# Patient Record
Sex: MALE | Race: WHITE | NOT HISPANIC OR LATINO | Employment: OTHER | URBAN - METROPOLITAN AREA
[De-identification: names, ages, dates, MRNs, and addresses within clinical notes are randomized per-mention and may not be internally consistent; named-entity substitution may affect disease eponyms.]

---

## 2017-02-27 ENCOUNTER — APPOINTMENT (OUTPATIENT)
Dept: LAB | Facility: CLINIC | Age: 67
End: 2017-02-27
Payer: MEDICARE

## 2017-02-27 DIAGNOSIS — R94.6 ABNORMAL RESULTS OF THYROID FUNCTION STUDIES: ICD-10-CM

## 2017-02-27 DIAGNOSIS — I10 ESSENTIAL (PRIMARY) HYPERTENSION: ICD-10-CM

## 2017-02-27 DIAGNOSIS — E78.5 HYPERLIPIDEMIA: ICD-10-CM

## 2017-02-27 LAB
ALBUMIN SERPL BCP-MCNC: 3.7 G/DL (ref 3.5–5)
ALP SERPL-CCNC: 83 U/L (ref 46–116)
ALT SERPL W P-5'-P-CCNC: 42 U/L (ref 12–78)
ANION GAP SERPL CALCULATED.3IONS-SCNC: 7 MMOL/L (ref 4–13)
AST SERPL W P-5'-P-CCNC: 17 U/L (ref 5–45)
BILIRUB SERPL-MCNC: 0.47 MG/DL (ref 0.2–1)
BUN SERPL-MCNC: 11 MG/DL (ref 5–25)
CALCIUM SERPL-MCNC: 9.2 MG/DL (ref 8.3–10.1)
CHLORIDE SERPL-SCNC: 103 MMOL/L (ref 100–108)
CHOLEST SERPL-MCNC: 191 MG/DL (ref 50–200)
CO2 SERPL-SCNC: 28 MMOL/L (ref 21–32)
CREAT SERPL-MCNC: 0.77 MG/DL (ref 0.6–1.3)
GFR SERPL CREATININE-BSD FRML MDRD: >60 ML/MIN/1.73SQ M
GLUCOSE SERPL-MCNC: 100 MG/DL (ref 65–140)
HDLC SERPL-MCNC: 59 MG/DL (ref 40–60)
LDLC SERPL CALC-MCNC: 116 MG/DL (ref 0–100)
POTASSIUM SERPL-SCNC: 4.3 MMOL/L (ref 3.5–5.3)
PROT SERPL-MCNC: 7.5 G/DL (ref 6.4–8.2)
SODIUM SERPL-SCNC: 138 MMOL/L (ref 136–145)
TRIGL SERPL-MCNC: 79 MG/DL
TSH SERPL DL<=0.05 MIU/L-ACNC: 3.48 UIU/ML (ref 0.36–3.74)

## 2017-02-27 PROCEDURE — 84443 ASSAY THYROID STIM HORMONE: CPT

## 2017-02-27 PROCEDURE — 80061 LIPID PANEL: CPT

## 2017-02-27 PROCEDURE — 80053 COMPREHEN METABOLIC PANEL: CPT

## 2017-02-27 PROCEDURE — 36415 COLL VENOUS BLD VENIPUNCTURE: CPT

## 2017-03-10 ENCOUNTER — ALLSCRIPTS OFFICE VISIT (OUTPATIENT)
Dept: OTHER | Facility: OTHER | Age: 67
End: 2017-03-10

## 2017-06-29 ENCOUNTER — ALLSCRIPTS OFFICE VISIT (OUTPATIENT)
Dept: OTHER | Facility: OTHER | Age: 67
End: 2017-06-29

## 2017-09-01 DIAGNOSIS — I10 ESSENTIAL (PRIMARY) HYPERTENSION: ICD-10-CM

## 2017-09-01 DIAGNOSIS — E78.5 HYPERLIPIDEMIA: ICD-10-CM

## 2017-09-01 DIAGNOSIS — Z12.5 ENCOUNTER FOR SCREENING FOR MALIGNANT NEOPLASM OF PROSTATE: ICD-10-CM

## 2017-09-01 DIAGNOSIS — R94.6 ABNORMAL RESULTS OF THYROID FUNCTION STUDIES: ICD-10-CM

## 2017-09-11 ENCOUNTER — APPOINTMENT (OUTPATIENT)
Dept: LAB | Facility: CLINIC | Age: 67
End: 2017-09-11
Payer: MEDICARE

## 2017-09-11 DIAGNOSIS — R94.6 ABNORMAL RESULTS OF THYROID FUNCTION STUDIES: ICD-10-CM

## 2017-09-11 DIAGNOSIS — E78.5 HYPERLIPIDEMIA: ICD-10-CM

## 2017-09-11 DIAGNOSIS — Z12.5 ENCOUNTER FOR SCREENING FOR MALIGNANT NEOPLASM OF PROSTATE: ICD-10-CM

## 2017-09-11 DIAGNOSIS — I10 ESSENTIAL (PRIMARY) HYPERTENSION: ICD-10-CM

## 2017-09-11 LAB
ALBUMIN SERPL BCP-MCNC: 3.7 G/DL (ref 3.5–5)
ALP SERPL-CCNC: 85 U/L (ref 46–116)
ALT SERPL W P-5'-P-CCNC: 33 U/L (ref 12–78)
ANION GAP SERPL CALCULATED.3IONS-SCNC: 8 MMOL/L (ref 4–13)
AST SERPL W P-5'-P-CCNC: 18 U/L (ref 5–45)
BASOPHILS # BLD AUTO: 0.03 THOUSANDS/ΜL (ref 0–0.1)
BASOPHILS NFR BLD AUTO: 1 % (ref 0–1)
BILIRUB SERPL-MCNC: 0.7 MG/DL (ref 0.2–1)
BUN SERPL-MCNC: 15 MG/DL (ref 5–25)
CALCIUM SERPL-MCNC: 9.2 MG/DL (ref 8.3–10.1)
CHLORIDE SERPL-SCNC: 104 MMOL/L (ref 100–108)
CHOLEST SERPL-MCNC: 212 MG/DL (ref 50–200)
CO2 SERPL-SCNC: 26 MMOL/L (ref 21–32)
CREAT SERPL-MCNC: 0.76 MG/DL (ref 0.6–1.3)
EOSINOPHIL # BLD AUTO: 0.15 THOUSAND/ΜL (ref 0–0.61)
EOSINOPHIL NFR BLD AUTO: 3 % (ref 0–6)
ERYTHROCYTE [DISTWIDTH] IN BLOOD BY AUTOMATED COUNT: 13.2 % (ref 11.6–15.1)
GFR SERPL CREATININE-BSD FRML MDRD: 96 ML/MIN/1.73SQ M
GLUCOSE P FAST SERPL-MCNC: 107 MG/DL (ref 65–99)
HCT VFR BLD AUTO: 43 % (ref 36.5–49.3)
HDLC SERPL-MCNC: 42 MG/DL (ref 40–60)
HGB BLD-MCNC: 14.9 G/DL (ref 12–17)
LDLC SERPL CALC-MCNC: 145 MG/DL (ref 0–100)
LYMPHOCYTES # BLD AUTO: 1.55 THOUSANDS/ΜL (ref 0.6–4.47)
LYMPHOCYTES NFR BLD AUTO: 26 % (ref 14–44)
MCH RBC QN AUTO: 31.4 PG (ref 26.8–34.3)
MCHC RBC AUTO-ENTMCNC: 34.7 G/DL (ref 31.4–37.4)
MCV RBC AUTO: 91 FL (ref 82–98)
MONOCYTES # BLD AUTO: 0.72 THOUSAND/ΜL (ref 0.17–1.22)
MONOCYTES NFR BLD AUTO: 12 % (ref 4–12)
NEUTROPHILS # BLD AUTO: 3.57 THOUSANDS/ΜL (ref 1.85–7.62)
NEUTS SEG NFR BLD AUTO: 58 % (ref 43–75)
NRBC BLD AUTO-RTO: 0 /100 WBCS
PLATELET # BLD AUTO: 191 THOUSANDS/UL (ref 149–390)
PMV BLD AUTO: 11.3 FL (ref 8.9–12.7)
POTASSIUM SERPL-SCNC: 4.9 MMOL/L (ref 3.5–5.3)
PROT SERPL-MCNC: 7.7 G/DL (ref 6.4–8.2)
PSA SERPL-MCNC: 1.6 NG/ML (ref 0–4)
RBC # BLD AUTO: 4.74 MILLION/UL (ref 3.88–5.62)
SODIUM SERPL-SCNC: 138 MMOL/L (ref 136–145)
T4 FREE SERPL-MCNC: 0.83 NG/DL (ref 0.76–1.46)
TRIGL SERPL-MCNC: 124 MG/DL
TSH SERPL DL<=0.05 MIU/L-ACNC: 4.09 UIU/ML (ref 0.36–3.74)
WBC # BLD AUTO: 6.02 THOUSAND/UL (ref 4.31–10.16)

## 2017-09-11 PROCEDURE — 80053 COMPREHEN METABOLIC PANEL: CPT

## 2017-09-11 PROCEDURE — 80061 LIPID PANEL: CPT

## 2017-09-11 PROCEDURE — 84439 ASSAY OF FREE THYROXINE: CPT

## 2017-09-11 PROCEDURE — 84443 ASSAY THYROID STIM HORMONE: CPT

## 2017-09-11 PROCEDURE — G0103 PSA SCREENING: HCPCS

## 2017-09-11 PROCEDURE — 85025 COMPLETE CBC W/AUTO DIFF WBC: CPT

## 2017-09-11 PROCEDURE — 36415 COLL VENOUS BLD VENIPUNCTURE: CPT

## 2017-09-18 ENCOUNTER — ALLSCRIPTS OFFICE VISIT (OUTPATIENT)
Dept: OTHER | Facility: OTHER | Age: 67
End: 2017-09-18

## 2017-11-10 ENCOUNTER — ALLSCRIPTS OFFICE VISIT (OUTPATIENT)
Dept: OTHER | Facility: OTHER | Age: 67
End: 2017-11-10

## 2017-11-14 NOTE — PROGRESS NOTES
Assessment    1  Serrated adenoma of colon (211 3) (D12 6)   2  Hemorrhoids (455 6) (K64 9)    Discussion/Summary  Discussion Summary:   60-year-old male with prior Colonoscopy by Dr Blake Lopez in November of 2016 at that time he had a sessile serrated adenoma polypectomy from a polyp located at the ileocecal valve  In the interim he had some internal hemorrhoids that were bleeding intermittently  These have come down significantly with the use of Anusol HC suppositories and cream   Colonoscopy, possible internal hemorrhoid banding  Hopefully we can discontinue his hemorrhoid cream at least prescription strength postoperatively  understands to stop his aspirin preoperatively  Goals and Barriers: The patient has the current Goals: Colonoscopy with banding as scheduled  The patent has the current Barriers: None identified  The [facility] [employer], [facility]  is expensive and we hope to discontinue that soon  Patient's Capacity to Self-Care: Patient is able to Self-Care  Patient Education: Educational resources provided: Colonoscopy packet  Medication SE Review and Pt Understands Tx: Possible side effects of new medications were reviewed with the patient/guardian today  The treatment plan was reviewed with the patient/guardian  The patient/guardian understands and agrees with the treatment plan   Counseling Documentation With Imm: The patient was counseled regarding Colonoscopy and banding  Chief Complaint  Chief Complaint Free Text Note Form: Patient is here for a colonoscopy consult  Patient was given a colono information packet  Last colono 2016  deniesmovement: regular, no melena      History of Present Illness  HPI: 73 y o M here for colonoscopy consult  Patient has not acute complaints colonoscopy was in 2016 with hemorrhoid banding  POlyps were visualized and sent for pathology  Results showed serrated adenoma  Earlier this year pt was seen for symptomatic hemorrhoids and bleeding per rectum   He was prescribe steroid topical cream which has help with the pain and bleeding  Pt has no personal or family history of colon cancer  Denies any changes to stool caliber , blood in stool, abdominal pain, nausea, vomiting, fever, weight loss, or night sweats  He had a stress test last year that was said to e normal  He has no cardiac history other than a physiologic murmur  No adverse reaction to anesthesia reported in the past  GG PGY  male status post Colonoscopy by Dr Sridhar Nichols in November of 2016, 1 year ago  He was seen in the into Room in June and we gave him some Anusol for his hemorrhoids which were intermittently bleeding at that time  These have calmed down significantly and the only flare he had with after bike riding with his granddaughter  his Colonoscopy he had a sessile serrated adenoma at the ileocecal polyp and a tubular adenoma also biopsied  He was recalled for 1 year follow-up Colonoscopy to re-evaluate the area of sessile serrated adenoma at the ileocecal valve  he is eating, drinking, moving his bowels without difficulty  No cardiac history  No prior reaction to anesthesia  Review of Systems  Complete-Male:  Constitutional: no fever-- and-- no chills  Eyes: no eye pain  ENT: no nosebleeds  Cardiovascular: no chest pain  Respiratory: no cough  Gastrointestinal: no nausea  Genitourinary: no urinary hesitancy  Musculoskeletal: no joint swelling  Integumentary: no itching  Neurological: no numbness  Psychiatric: no anxiety  Endocrine: no muscle weakness  Hematologic/Lymphatic: no tendency for easy bleeding  Active Problems  1  Abnormal finding on EKG (794 31) (R94 31)   2  Amaurosis fugax (362 34) (G45 3)   3  Aortic regurgitation (424 1) (I35 1)   4  Aortic stenosis (424 1) (I35 0)   5  Colon cancer screening (V76 51) (Z12 11)   6  Colonoscopy (Fiberoptic) Screening   7  Elevated TSH (794 5) (R94 6)   8  Hemorrhoids (455 6) (K64 9)   9  History of bicuspid aortic valve (V13 65) (Z87 74)   10  Hyperlipidemia (272 4) (E78 5)   11  Hypertension (401 9) (I10)   12  Lesion of skin of breast (611 9) (N64 9)   13  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)   14  Mitral regurgitation (424 0) (I34 0)   15  Need for hepatitis C screening test (V73 89) (Z11 59)   16  Need for pneumococcal vaccination (V03 82) (Z23)   17  Need for prophylactic vaccination and inoculation against influenza (V04 81) (Z23)   18  Onychomycosis of toenail (110 1) (B35 1)   19  Screening for genitourinary condition (V81 6) (Z13 89)   20  Special screening examination for neoplasm of prostate (V76 44) (Z12 5)   21  Visual disturbances (368 9) (H53 9)   22  Vitamin D deficiency (268 9) (E55 9)    Past Medical History  1  Hemorrhoids (455 6) (K64 9)   2  History of colonic polyps (V12 72) (Z86 010)   3  History of diverticulosis (V12 79) (Z87 19)   4  History of hyperlipidemia (V12 29) (Z86 39)   5  History of hypertension (V12 59) (Z86 79)   6  History of Murmur (785 2) (R01 1)   7  Need for prophylactic vaccination and inoculation against influenza (V04 81) (Z23)  Active Problems And Past Medical History Reviewed: The active problems and past medical history were reviewed and updated today  Surgical History  1  History of Complete Colonoscopy   2  Denied: History Of Prior Surgery  Surgical History Reviewed: The surgical history was reviewed and updated today  Family History  Mother    1  Family history of Asthma (V17 5)   2  Family history of congestive heart failure (V17 49) (Z82 49)   3  Family history of depression (V17 0) (Z81 8)   4  Family history of Mother  At Age 76  Father    11  Family history of alcohol abuse (V61 41) (Z81 1)   6  Family history of cerebrovascular accident (V17 1) (Z82 3)   7  Family history of Father  At Age 76  Sister    6  Family history of thyroid disease (V18 19) (Z83 49)  Brother    5  Family history of Brother  At Age 62  Family History    10   Family history of Stroke Syndrome (V17 1)  Family History Reviewed: The family history was reviewed and updated today  Social History     · Alcohol Use (History)   · Caffeine Use   · Denied: History of Drug Use   · Has smoke detectors   · Never a smoker   · Uses Safety Equipment - Seatbelts  Social History Reviewed: The social history was reviewed and updated today  Current Meds   1  Analpram-HC 1-1 % Rectal Cream; USE AS DIRECTED; Therapy: 94HNK9605 to (Last Nori Mean)  Requested for: 79VWF4144 Ordered   2  Anusol-HC 25 MG Rectal Suppository; INSERT 1 SUPPOSITORY RECTALLY EVERY 4 TO 6 HOURS AS NEEDED; Therapy: 90URQ8818 to (LODILWLS:99PMZ6294)  Requested for: 24OSS4611; Last Rx:29Jun2017 Ordered   3  Aspirin 81 MG Oral Tablet Delayed Release; take one tablet by mouth daily; Therapy: 54Vwi8546 to (Evaluate:12Apr2017); Last Rx:72Daf6538 Ordered   4  Atorvastatin Calcium 40 MG Oral Tablet; take 1 tablet every day; Therapy: 48JRS4067 to (Evaluate:39Fxn9762)  Requested for: 70CEK7055; Last Rx:01Mar2017 Ordered   5  Centrum Silver TABS; TAKE 1 TABLET DAILY; Therapy: (Recorded:13Nov2012) to Recorded   6  Aldrich-3 Fish Oil CAPS; TAKE 1 CAPSULE DAILY; Therapy: (Recorded:13Nov2012) to Recorded   7  Valsartan 160 MG Oral Tablet; take 1 tablet every day; Therapy: 59DWL1642 to ([facility])  Requested for: 04KYM5237; Last Rx:04Xrb6991 Ordered   8  Vitamin D3 400 UNIT Oral Capsule; TAKE 1 CAPSULE DAILY; Therapy: 29VFF5314 to Recorded   9  Zetia 10 MG Oral Tablet; TAKE 1 TABLET DAILY; Therapy: (Recorded:10Nov2017) to Recorded  Medication List Reviewed: The medication list was reviewed and updated today  Allergies  1  Sulfa Drugs  2  No Known Environmental Allergies   3   No Known Food Allergies    Vitals  Vital Signs    Recorded: 83RQE6034 08:46AM   Temperature 98 9 F, Tympanic   Heart Rate 78, R Radial   Pulse Quality Normal, R Radial   Respiration Quality Normal   Respiration 16   Systolic 085, RUE, Sitting Diastolic 82, RUE, Sitting   Height 5 ft 7 in   Weight 182 lb    BMI Calculated 28 51   BSA Calculated 1 94       Physical Exam   Constitutional well appearing well groomed 73year old male in no acute distress  Pulmonary  Respiratory effort: No increased work of breathing or signs of respiratory distress  Auscultation of lungs: Clear to auscultation, equal breath sounds bilaterally, no wheezes, no rales, no rhonci  Cardiovascular  Auscultation of heart: Normal rate and rhythm, normal S1 and S2, without murmurs  Examination of extremities for edema and/or varicosities: Normal    Abdomen Abdomen soft and benign without tenderness rebound or guarding  Normal active bowel sounds  Skin  Skin and subcutaneous tissue: Normal without rashes or lesions  Psychiatric  Orientation to person, place and time: Normal    Mood and affect: Normal        Provider Comments  Provider Comments:   -  was discussed with the patient at length for Colonoscopy  discussion regarding risks, benefits, and alternatives  Risks of colonoscopy discussed to include bleeding, injury to internal organs, and perforation, which would require surgery for repair  A colon perforation may result in an ostomy bag if necessary for repair  The complications may be identified at the time of procedure or may be delayed in identification  is also a certain percentage of patients who have missed polyps, polyps not seen on the original colonoscopy  This is in inherent risks of the procedure  The patient is aware of Dr El Mark and complication rates for colonoscopy  preparation was explained to the patient at length, including a bowel preparation  Information sheet for bowel preparation is both given to patient and explained in detail  was instructed to take their hypertension medications prior to surgery, stop any blood thinners, and modulate their diabetes medications as per their Primary Care Physician's instructions    understand the risks and benefits and agreed to the plan  Questions are discussed and answered at length  Health Management  Colonoscopy (Fiberoptic) Screening   COLONOSCOPY; every 1 year; Last 33SFV5387; Next Due: 77Cuu6675; Near Due  Hypertension   EKG/ECG- POC; every 1 year; Last 43NJG6225; Next Due: 64PJQ8078; Overdue    Future Appointments    Date/Time Provider Specialty Site   03/23/2018 01:30 PM YANIQUE Castillo   [address] [place] [address]   11/15/2017 01:40 PM Samantha Miller DO Cardiology [facility]       Signatures   Electronically signed by : Lee Ann Grier, [facility]; Nov 10 2017  9:19AM EST                       (Author)    Electronically signed by : Brenda Harper MD; Nov 13 2017  8:29AM EST

## 2017-11-15 ENCOUNTER — ALLSCRIPTS OFFICE VISIT (OUTPATIENT)
Dept: OTHER | Facility: OTHER | Age: 67
End: 2017-11-15

## 2017-11-15 DIAGNOSIS — I35.0 NONRHEUMATIC AORTIC VALVE STENOSIS: ICD-10-CM

## 2017-11-15 DIAGNOSIS — I35.1 NONRHEUMATIC AORTIC VALVE INSUFFICIENCY: ICD-10-CM

## 2017-11-17 NOTE — PROGRESS NOTES
Assessment  Assessed    1  Aortic regurgitation (424 1) (I35 1)   2  Aortic stenosis (424 1) (I35 0)   3  Hyperlipidemia (272 4) (E78 5)   4  Hypertension (401 9) (I10)    Plan  Aortic regurgitation, Aortic stenosis    · Follow-up visit in 1 year Evaluation and Treatment  Follow-up  Status: Complete  Done:15Nov2017   Ordered; Aortic regurgitation, Aortic stenosis; Ordered By: Nina Arias Performed:  Due: 33KAG2497; Last Updated By: Tho Henson; 11/15/2017 2:20:01 PM   · ECHO COMPLETE WITH CONTRAST IF INDICATED; Status:Active; Requestedfor:15Nov2017;    Perform:[facility] Radiology; FNS:71QGE3260; Last Updated Doin Dangelo; 11/15/2017 2:20:01 PM;Ordered; For:Aortic regurgitation, Aortic stenosis; Ordered By:Puneet Owen; Aortic regurgitation, Hypertension    · EKG/ECG- POC; Status:Complete;   Done: 18OVO3149   Perform: In Office; Due:80Top8683; Last Updated By:Denton Pompa; 11/15/2017 1:41:17 PM;Ordered;regurgitation, Hypertension; Ordered By:German Owen;  Hypertension    · EKG/ECG- POC ; every 1 year; Last 86NTV1689; Next 79NMG3362; Status:Active   For: 'Hypertension'Ordered By: Eitan Rico    Discussion/Summary  Cardiology Discussion Summary Free Text Note Form [facility]:   1  h/o Amaurosis fugax 2016,  AS/AR  better on zetia/atorvastatin but cannot afford zetia  Hypertension, controlled  Patient is doing well  Continue daily exercise as he does  Sensible diet  Will obtain follow-up 2D echocardiogram before visit next year  He cannot tolerate receive a statin  Would only anticipate 6% reduction in LDL from 40 mg of atorvastatin to 80 mg  There has been improvement with Zetia but given the fact he is on primary prevention statin without the cardiac event will leave atorvastatin 40 mg daily which is considered a high potency statin at this point  follow-up recommended  Annual lipids and CMP  Chief Complaint  Chief Complaint Free Text Note Form: Annual f/u with EKG   [facility] denies chest pain, no SOB or swelling in LE      History of Present Illness  Cardiology HPI Free Text Note Form [facility]: Patient here follow-up history of bothered valve stenosis mild aortic valve regurgitation  pressure has been controlled  He has been taking pulse are 10 daily  status also improved on both atorvastatin and Zetia  However, lipids run in the 140 range on atorvastatin only  Was intolerant to Crestor due to indigestion  Had better handle on both combination atorvastatin 40 mg and Zetia 10 but was unable to afford  feels well  He walks 3-5 times throughout the week  And he walks 3-5 miles without difficulty  Review of Systems  Cardiology Male ROS:    Cardiac: No complaints of chest pain, no palpitations, no fainiting  Skin: No complaints of nonhealing sores or skin rash  Genitourinary: No complaints of recurrent urinary tract infections, frequent urination at night, difficult urination, blood in urine, kidney stones, loss of bladder control, no kidney or prostate problems, no erectile dysfunction  Psychological: No complaints of feeling depressed, anxiety, panic attacks, or difficulty concentrating  General: No complaints of trouble sleeping, lack of energy, fatigue, appetite changes, weight changes, fever, frequent infections, or night sweats  Respiratory: No complaints of shortness of breath, cough with sputum, or wheezing  HEENT: No complaints of serious problems, hearing problems, nose problems, throat problems, or snoring  Gastrointestinal: No complaints of liver problems, nausea, vomiting, heartburn, constipation, bloody stools, diarrhea, problems swallowing, adbominal pain, or rectal bleeding  Hematologic: No complaints of bleeding disorders, anemia, blood clots, or excessive brusing  Neurological: No complaints of numbness, tingling, dizziness, weakness, seizures, headaches, syncope or fainting, AM fatigue, daytime sleepiness, no witnessed apnea episodes    Musculoskeletal: No complaints of arthritis, back pain, or painfull swelling  ROS Reviewed:   ROS reviewed  Active Problems  Problems    1  Abnormal finding on EKG (794 31) (R94 31)   2  Amaurosis fugax (362 34) (G45 3)   3  Aortic regurgitation (424 1) (I35 1)   4  Aortic stenosis (424 1) (I35 0)   5  Colon cancer screening (V76 51) (Z12 11)   6  Colonoscopy (Fiberoptic) Screening   7  Elevated TSH (794 5) (R94 6)   8  Hemorrhoids (455 6) (K64 9)   9  History of bicuspid aortic valve (V13 65) (Z87 74)   10  Hyperlipidemia (272 4) (E78 5)   11  Hypertension (401 9) (I10)   12  Lesion of skin of breast (611 9) (N64 9)   13  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)   14  Mitral regurgitation (424 0) (I34 0)   15  Need for hepatitis C screening test (V73 89) (Z11 59)   16  Need for pneumococcal vaccination (V03 82) (Z23)   17  Need for prophylactic vaccination and inoculation against influenza (V04 81) (Z23)   18  Onychomycosis of toenail (110 1) (B35 1)   19  Screening for genitourinary condition (V81 6) (Z13 89)   20  Serrated adenoma of colon (211 3) (D12 6)   21  Special screening examination for neoplasm of prostate (V76 44) (Z12 5)   22  Visual disturbances (368 9) (H53 9)   23  Vitamin D deficiency (268 9) (E55 9)    Past Medical History  Problems    1  Hemorrhoids (455 6) (K64 9)   2  History of colonic polyps (V12 72) (Z86 010)   3  History of diverticulosis (V12 79) (Z87 19)   4  History of hyperlipidemia (V12 29) (Z86 39)   5  History of hypertension (V12 59) (Z86 79)   6  History of Murmur (785 2) (R01 1)   7  Need for prophylactic vaccination and inoculation against influenza (V04 81) (Z23)  Active Problems And Past Medical History Reviewed: The active problems and past medical history were reviewed and updated today  Surgical History  Problems    1  History of Complete Colonoscopy   2  Denied: History Of Prior Surgery  Surgical History Reviewed: The surgical history was reviewed and updated today  Family History  Mother    1  Family history of Asthma (V17 5)   2  Family history of congestive heart failure (V17 49) (Z82 49)   3  Family history of depression (V17 0) (Z81 8)   4  Family history of Mother  At Age 76  Father    11  Family history of alcohol abuse (V61 41) (Z81 1)   6  Family history of cerebrovascular accident (V17 1) (Z82 3)   7  Family history of Father  At Age 76  Sister    6  Family history of thyroid disease (V18 19) (Z83 49)  Brother    5  Family history of Brother  At Age 62  Family History    10  Family history of Stroke Syndrome (V17 1)  Family History Reviewed: The family history was reviewed and updated today  Social History  Problems    · Alcohol Use (History)   · Caffeine Use   · Denied: History of Drug Use   · Has smoke detectors   · Never a smoker   · Uses Safety Equipment - Seatbelts  Social History Reviewed: The social history was reviewed and updated today  Current Meds   1  Analpram-HC 1-1 % Rectal Cream; USE AS DIRECTED; Therapy: 79HAP7624 to (Last Tiff Patricio)  Requested for: 79OED4569 Ordered   2  Aspirin 81 MG Oral Tablet Delayed Release; take one tablet by mouth daily; Therapy: 41FYI3740 to (Evaluate:2018) Recorded   3  Atorvastatin Calcium 40 MG Oral Tablet; take 1 tablet every day; Therapy: 35AMI7021 to (Evaluate:30Tdr8398)  Requested for: 70UQF7066; Last Rx:2017 Ordered   4  Centrum Silver TABS; TAKE 1 TABLET DAILY; Therapy: (Recorded:2012) to Recorded   5  [place]-3 Fish Oil CAPS; TAKE 1 CAPSULE DAILY; Therapy: (Recorded:2012) to Recorded   6  Travatan Z 0 004 % Ophthalmic Solution; Therapy: 88EYA6403 to Recorded   7  Valsartan 160 MG Oral Tablet; take 1 tablet every day; Therapy: 19MFD6356 to (Victorino Sommers)  Requested for: 03TSE2522; Last Rx:36Yas4136 Ordered   8  Vitamin D3 400 UNIT Oral Capsule; TAKE 1 CAPSULE DAILY; Therapy: 69XWQ3342 to Recorded   9   Zetia 10 MG Oral Tablet; TAKE 1 TABLET DAILY; Therapy: (Recorded:10Nov2017) to Recorded    Allergies  Medication    1  Sulfa Drugs  Non-Medication    2  No Known Environmental Allergies   3  No Known Food Allergies    Vitals  Vital Signs    Recorded: 38ZCL3010 01:50PM   Heart Rate 65, Apical   Systolic 883, RUE   Diastolic 80, RUE   Height 5 ft 7 in   Weight 183 lb    BMI Calculated 28 66   BSA Calculated 1 95       Physical Exam   Constitutional  General appearance: No acute distress, well appearing and well nourished  Eyes  Conjunctiva and Sclera examination: Conjunctiva pink, sclera anicteric  Ears, Nose, Mouth, and Throat - Oropharynx: Clear, nares are clear, mucous membranes are moist   Neck  Neck and thyroid: Normal, supple, trachea midline, no thyromegaly  Pulmonary  Respiratory effort: No increased work of breathing or signs of respiratory distress  Auscultation of lungs: Clear to auscultation, no rales, no rhonchi, no wheezing, good air movement  Cardiovascular  Auscultation of heart: Abnormal  -- 1/6 zoë  Carotid pulses: Normal, 2+ bilaterally  Peripheral vascular exam: Normal pulses throughout, no tenderness, erythema or swelling  Pedal pulses: Normal, 2+ bilaterally  Examination of extremities for edema and/or varicosities: Normal    Abdomen  Abdomen: Non-tender and no distention  Liver and spleen: No hepatomegaly or splenomegaly  Musculoskeletal Gait and station: Normal gait  -- Digits and nails: Normal without clubbing or cyanosis  -- Inspection/palpation of joints, bones, and muscles: Normal, ROM normal    Skin - Skin and subcutaneous tissue: Normal without rashes or lesions  Skin is warm and well perfused, normal turgor  Neurologic - Cranial nerves: II - XII intact  -- Speech: Normal    Psychiatric - Orientation to person, place, and time: Normal -- Mood and affect: Normal       Health Management  Colonoscopy (Fiberoptic) Screening   COLONOSCOPY; every 1 year;  Last 47CCR0303; Next Due: 29Nov2017; Near Due  Hypertension EKG/ECG- POC; every 1 year; Last 22XGC2095; Next Due: 35EMH5610; Active    Future Appointments    Date/Time Provider Specialty Site   03/23/2018 01:30 PM Mikie Canavan, M D   [address] [place] [address]   11/28/2017 11:00 AM Cindy Kennedy MD General Surgery [facility]  [id number]       Signatures   Electronically signed by : Bashir Steiner DO; Nov 16 2017 10:34AM EST                       (Author)

## 2017-11-20 RX ORDER — HYDROCORTISONE ACETATE 25 MG/1
25 SUPPOSITORY RECTAL 2 TIMES DAILY
Status: ON HOLD | COMMUNITY
End: 2018-02-26

## 2017-11-27 ENCOUNTER — ANESTHESIA EVENT (OUTPATIENT)
Dept: GASTROENTEROLOGY | Facility: HOSPITAL | Age: 67
End: 2017-11-27
Payer: MEDICARE

## 2017-11-28 ENCOUNTER — ANESTHESIA (OUTPATIENT)
Dept: GASTROENTEROLOGY | Facility: HOSPITAL | Age: 67
End: 2017-11-28
Payer: MEDICARE

## 2017-11-28 ENCOUNTER — HOSPITAL ENCOUNTER (OUTPATIENT)
Facility: HOSPITAL | Age: 67
Setting detail: OUTPATIENT SURGERY
Discharge: HOME | End: 2017-11-28
Attending: SURGERY | Admitting: SURGERY
Payer: MEDICARE

## 2017-11-28 ENCOUNTER — GENERIC CONVERSION - ENCOUNTER (OUTPATIENT)
Dept: PERIOP | Facility: HOSPITAL | Age: 67
End: 2017-11-28

## 2017-11-28 VITALS
HEIGHT: 67 IN | BODY MASS INDEX: 28.25 KG/M2 | DIASTOLIC BLOOD PRESSURE: 85 MMHG | RESPIRATION RATE: 20 BRPM | SYSTOLIC BLOOD PRESSURE: 146 MMHG | WEIGHT: 180 LBS | HEART RATE: 55 BPM | TEMPERATURE: 96.3 F | OXYGEN SATURATION: 100 %

## 2017-11-28 DIAGNOSIS — K64.9 HEMORRHOIDS: ICD-10-CM

## 2017-11-28 DIAGNOSIS — D12.6 BENIGN NEOPLASM OF COLON: ICD-10-CM

## 2017-11-28 PROCEDURE — 88305 TISSUE EXAM BY PATHOLOGIST: CPT | Performed by: SURGERY

## 2017-11-28 RX ORDER — METRONIDAZOLE 500 MG/1
500 TABLET ORAL 3 TIMES DAILY
Qty: 30 TABLET | Refills: 0 | Status: SHIPPED | OUTPATIENT
Start: 2017-11-28 | End: 2017-12-08

## 2017-11-28 RX ORDER — PROPOFOL 10 MG/ML
INJECTION, EMULSION INTRAVENOUS AS NEEDED
Status: DISCONTINUED | OUTPATIENT
Start: 2017-11-28 | End: 2017-11-28 | Stop reason: SURG

## 2017-11-28 RX ORDER — SODIUM CHLORIDE 9 MG/ML
125 INJECTION, SOLUTION INTRAVENOUS CONTINUOUS
Status: DISCONTINUED | OUTPATIENT
Start: 2017-11-28 | End: 2017-11-28 | Stop reason: HOSPADM

## 2017-11-28 RX ADMIN — PROPOFOL 50 MG: 10 INJECTION, EMULSION INTRAVENOUS at 12:00

## 2017-11-28 RX ADMIN — SODIUM CHLORIDE: 0.9 INJECTION, SOLUTION INTRAVENOUS at 11:50

## 2017-11-28 RX ADMIN — PROPOFOL 50 MG: 10 INJECTION, EMULSION INTRAVENOUS at 11:45

## 2017-11-28 RX ADMIN — SODIUM CHLORIDE 125 ML/HR: 0.9 INJECTION, SOLUTION INTRAVENOUS at 10:28

## 2017-11-28 RX ADMIN — PROPOFOL 50 MG: 10 INJECTION, EMULSION INTRAVENOUS at 11:50

## 2017-11-28 RX ADMIN — PROPOFOL 50 MG: 10 INJECTION, EMULSION INTRAVENOUS at 11:40

## 2017-11-28 RX ADMIN — SODIUM CHLORIDE: 0.9 INJECTION, SOLUTION INTRAVENOUS at 11:53

## 2017-11-28 RX ADMIN — PROPOFOL 50 MG: 10 INJECTION, EMULSION INTRAVENOUS at 11:55

## 2017-11-28 RX ADMIN — PROPOFOL 100 MG: 10 INJECTION, EMULSION INTRAVENOUS at 11:33

## 2017-11-28 RX ADMIN — PROPOFOL 50 MG: 10 INJECTION, EMULSION INTRAVENOUS at 11:35

## 2017-11-28 RX ADMIN — SODIUM CHLORIDE: 0.9 INJECTION, SOLUTION INTRAVENOUS at 11:31

## 2017-11-28 NOTE — PROGRESS NOTES
D/C instructions given to Pt and wife who verbalize understanding  Judith Cowan was here to talk with Pt  OOB to ambulate, gait steady denies dizziness   Denies need to use BR

## 2017-11-28 NOTE — DISCHARGE INSTRUCTIONS
[facility]  Endoscopy Post-Operative Instructions  Dr Caitie Roemro MD, FACS    Procedure: Colonoscopy    Findings:  Diverticulosis, Hemorrhoids and Polyp(s)  Follow-Up: You will need a rep    You also had colitis in the mid sigmoid or left colon  Antibiotics were prescribed for this and likely will follow up with Gastroenterology for repeat sigmoidoscopy in a few months  Will discuss in the office  eat Endoscopy in (generally)5 years  Will await final pathology report for final determination of number of years until your follow up endoscopy, if you had polyps on this exam   Different types of polyps require different lengths of follow up surveillance  Please call our office or your primary doctor's office if you have any questions, once the report is returned  You should have an endoscopy sooner than recommended if you have any symptoms of bleeding or change in stools or other concerns  You will receive a call from our office with your results, in addition to the the preliminary results you received today  You will usually receive a follow-up letter from our office in 1-2 weeks  Call the office if you do not hear from us  You are welcome to also schedule an office visit if desired to discuss the results further  It is your responsibility to contact our office for results in 1- 2 weeks if you do not hear from us  If a follow up endoscopy is needed, you are responsible for arranging that follow up appointment at the appropriate time  The office may or may not issue a reminder at that future time  Please take responsibility for your own follow up healthcare  Diet: Eat a light snack first, and then resume your previous diet  Discharge Medications:  See after visit summary for reconciled discharge medications provided to patient and family        Current Facility-Administered Medications:     lidocaine-epinephrine (XYLOCAINE-MPF/EPINEPHRINE) 1%-1:200,000 injection 1 mL, 1 mL, Infiltration, Once, Rajinder Melgoza MD    sodium chloride 0 9 % infusion, 125 mL/hr, Intravenous, Continuous, Idania Hoffman DO, Last Rate: 125 mL/hr at 11/28/17 1028, 125 mL/hr at 11/28/17 1028  Do not take aspirin or blood thinning medications for 2 days following procedure  Tylenol is okay  You may have been given a new medication  Please take this (usually an anti-ulcer -type medication) and see your primary care doctor for refills and follow up medications if needed       After the test: Notify physician for arm swelling or pain at the intravenous site  You may have some abdominal discomfort following the procedure  Belching or passing flatus will help relieve it  Following upper endoscopy, you may experience a temporary sore throat  Saline gargles or lozenges can be taken to relieve sore throat Following lower endoscopy, minor rectal bleeding is not uncommon      Activity: Do not drive a car, operate machinery, or sign legal documents for 24 hours after your procedure  Normal activity may be resumed on the day following the procedure      Call the office at [phone] for any of the following: Severe abdominal pain, significant rectal bleeding, chills, or fever above 100°, new onset of persistent cough or persistent vomiting      [address], [address] [facility]  Phone: [phone]                        Colorectal Polyps   WHAT YOU NEED TO KNOW:   Colorectal polyps are small growths of tissue in the lining of the colon and rectum  Most polyps are hyperplastic polyps and are usually benign (noncancerous)  Certain types of polyps, called adenomatous polyps, may turn into cancer  DISCHARGE INSTRUCTIONS:   Follow up with your healthcare provider or gastroenterologist as directed: You may need to return for more tests, such as another colonoscopy   Write down your questions so you remember to ask them during your visits  Reduce your risk for colorectal polyps:   · Eat a variety of healthy foods:  Healthy foods include fruit, vegetables, whole-grain breads, low-fat dairy products, beans, lean meat, and fish  Ask if you need to be on a special diet  · Maintain a healthy weight:  Ask your healthcare provider if you need to lose weight and how much you need to lose  Ask for help with a weight loss program     · Exercise:  Begin to exercise slowly and do more as you get stronger  Talk with your healthcare provider before you start an exercise program      · Limit alcohol:  Your risk for polyps increases the more you drink  · Do not smoke: If you smoke, it is never too late to quit  Ask for information about how to stop  For support and more information:   · Mona Rabago ([facility]) [address], [place] [address]  Phone: [phone]  Web Address: www digestive  niddk nih gov  Contact your healthcare provider or gastroenterologist if:   · You have a fever  · You have chills, a cough, or feel weak and achy  · You have abdominal pain that does not go away or gets worse after you take medicine  · Your abdomen is swollen  · You are losing weight without trying  · You have questions or concerns about your condition or care  Seek care immediately or call 911 if:   · You have sudden shortness of breath  · You have a fast heart rate, fast breathing, or are too dizzy to stand up  · You have severe abdominal pain  · You see blood in your bowel movement  © 2017 [address] Information is for End User's use only and may not be sold, redistributed or otherwise used for commercial purposes  All illustrations and images included in CareNotes® are the copyrighted property of [employer]  or Casey Diaz  The above information is an  only   It is not intended as medical advice for individual conditions or treatments  Talk to your doctor, nurse or pharmacist before following any medical regimen to see if it is safe and effective for you  Diverticulosis   WHAT YOU NEED TO KNOW:   Diverticulosis is a condition that causes small pockets called diverticula to form in your intestine  These pockets make it difficult for bowel movements to pass through your digestive system  DISCHARGE INSTRUCTIONS:   Seek care immediately if:   · You have severe pain on the left side of your lower abdomen  · Your bowel movements are bright or dark red  Contact your healthcare provider if:   · You have a fever and chills  · You feel dizzy or lightheaded  · You have nausea, or you are vomiting  · You have a change in your bowel movements  · You have questions or concerns about your condition or care  Medicines:   · Medicines  to soften your bowel movements may be given  You may also need medicines to treat symptoms such as bloating and pain  · Take your medicine as directed  Contact your healthcare provider if you think your medicine is not helping or if you have side effects  Tell him or her if you are allergic to any medicine  Keep a list of the medicines, vitamins, and herbs you take  Include the amounts, and when and why you take them  Bring the list or the pill bottles to follow-up visits  Carry your medicine list with you in case of an emergency  Self-care: The goal of treatment is to manage any symptoms you have and prevent other problems such as diverticulitis  Diverticulitis is swelling or infection of the diverticula  Your healthcare provider may recommend any of the following:  · Eat a variety of high-fiber foods  High-fiber foods help you have regular bowel movements  High-fiber foods include cooked beans, fruits, vegetables, and some cereals  Most adults need 25 to 35 grams of fiber each day  Your healthcare provider may recommend that you have more   Ask your healthcare provider how much fiber you need  Increase fiber slowly  You may have abdominal discomfort, bloating, and gas if you add fiber to your diet too quickly  You may need to take a fiber supplement if you are not getting enough fiber from food  · Drink liquids as directed  You may need to drink 2 to 3 liters (8 to 12 cups) of liquids every day  Ask your healthcare provider how much liquid to drink each day and which liquids are best for you  · Apply heat  on your abdomen for 20 to 30 minutes every 2 hours for as many days as directed  Heat helps decrease pain and muscle spasms  Help prevent diverticulitis or other symptoms: The following may help decrease your risk for diverticulitis or symptoms, such as bleeding  Talk to your provider about these or other things you can do to prevent problems that may occur with diverticulosis  · Exercise regularly  Ask your healthcare provider about the best exercise plan for you  Exercise can help you have regular bowel movements  Get 30 minutes of exercise on most days of the week  · Maintain a healthy weight  Ask your healthcare provider how much you should weigh  Ask him or her to help you create a weight loss plan if you are overweight  · Do not smoke  Nicotine and other chemicals in cigarettes increase your risk for diverticulitis  Ask your healthcare provider for information if you currently smoke and need help to quit  E-cigarettes or smokeless tobacco still contain nicotine  Talk to your healthcare provider before you use these products  · Ask your healthcare provider if it is safe to take NSAIDs  NSAIDs may increase your risk of diverticulitis  Follow up with your healthcare provider as directed:  Write down your questions so you remember to ask them during your visits  © 2017 [address] Information is for End User's use only and may not be sold, redistributed or otherwise used for commercial purposes   All illustrations and images included in CareNotes® are the copyrighted property of [employer] M [employer]  or Casey Diaz  The above information is an  only  It is not intended as medical advice for individual conditions or treatments  Talk to your doctor, nurse or pharmacist before following any medical regimen to see if it is safe and effective for you  Hemorrhoids   WHAT YOU NEED TO KNOW:   Hemorrhoids are swollen blood vessels inside your rectum (internal hemorrhoids) or on your anus (external hemorrhoids)  Sometimes a hemorrhoid may prolapse  This means it extends out of your anus  DISCHARGE INSTRUCTIONS:   Seek care immediately if:   · You have severe pain in your rectum or around your anus  · You have severe pain in your abdomen and you are vomiting  · You have bleeding from your anus that soaks through your underwear  Contact your healthcare provider if:   · You have frequent and painful bowel movements  · Your hemorrhoid looks or feels more swollen than usual      · You do not have a bowel movement for 2 days or more  · You see or feel tissue coming through your anus  · You have questions or concerns about your condition or care  Medicines: You may  need any of the following:  · Medicine  may be given to decrease pain, swelling, and itching  The medicine may come as a pad, cream, or ointment  · Stool softeners  help treat or prevent constipation  · NSAIDs , such as ibuprofen, help decrease swelling, pain, and fever  NSAIDs can cause stomach bleeding or kidney problems in certain people  If you take blood thinner medicine, always ask your healthcare provider if NSAIDs are safe for you  Always read the medicine label and follow directions  · Take your medicine as directed  Contact your healthcare provider if you think your medicine is not helping or if you have side effects  Tell him or her if you are allergic to any medicine  Keep a list of the medicines, vitamins, and herbs you take  Include the amounts, and when and why you take them  Bring the list or the pill bottles to follow-up visits  Carry your medicine list with you in case of an emergency  Manage your symptoms:   · Apply ice on your anus for 15 to 20 minutes every hour or as directed  Use an ice pack, or put crushed ice in a plastic bag  Cover it with a towel before you apply it to your anus  Ice helps prevent tissue damage and decreases swelling and pain  · Take a sitz bath  Fill a bathtub with 4 to 6 inches of warm water  You may also use a sitz bath pan that fits inside a toilet bowl  Sit in the sitz bath for 15 minutes  Do this 3 times a day, and after each bowel movement  The warm water can help decrease pain and swelling  · Keep your anal area clean  Gently wash the area with warm water daily  Soap may irritate the area  After a bowel movement, wipe with moist towelettes or wet toilet paper  Dry toilet paper can irritate the area  Prevent hemorrhoids:   · Do not strain to have a bowel movement  Do not sit on the toilet too long  These actions can increase pressure on the tissues in your rectum and anus  · Drink plenty of liquids  Liquids can help prevent constipation  Ask how much liquid to drink each day and which liquids are best for you  · Eat a variety of high-fiber foods  Examples include fruits, vegetables, and whole grains  Ask your healthcare provider how much fiber you need each day  You may need to take a fiber supplement  · Exercise as directed  Exercise, such as walking, may make it easier to have a bowel movement  Ask your healthcare provider to help you create an exercise plan  · Do not have anal sex  Anal sex can weaken the skin around your rectum and anus  · Avoid heavy lifting  This can cause straining and increase your risk for another hemorrhoid    Follow up with your healthcare provider as directed:  Write down your questions so you remember to ask them during your visits  © 2017 [address] [facility] Information is for End User's use only and may not be sold, redistributed or otherwise used for commercial purposes  All illustrations and images included in CareNotes® are the copyrighted property of A [employer] A M [employer]  or Casey Diaz  The above information is an  only  It is not intended as medical advice for individual conditions or treatments  Talk to your doctor, nurse or pharmacist before following any medical regimen to see if it is safe and effective for you  Colitis   WHAT YOU NEED TO KNOW:   Colitis is swelling and irritation of your colon  Colitis may be caused by ulcers or a problem with your immune system  Bacteria, a virus, or a parasite may also cause colitis  The cause may not be known  You may have diarrhea, abdominal pain, fever, or blood or mucus in your bowel movement  DISCHARGE INSTRUCTIONS:   Return to the emergency department if:   · You have sudden trouble breathing  · Your bowel movements are black or have blood in them  · You have blood in your vomit  · You have severe abdominal pain or your abdomen is swollen and feels hard  · You have any of the following signs of dehydration:     ¨ Dizziness or weakness    ¨ Dry mouth, cracked lips, or severe thirst    ¨ Fast heartbeat or breathing    ¨ Urinating very little or not at all  Contact your healthcare provider if:   · Your symptoms get worse or do not go away  · You have a fever, chills, cough, or feel weak and achy  · You suddenly lose weight without trying  · You have questions or concerns about your condition or care  Medicines:   · Medicines  may be given to decrease inflammation in your colon and treat diarrhea  · Take your medicine as directed  Contact your healthcare provider if you think your medicine is not helping or if you have side effects  Tell him of her if you are allergic to any medicine   Keep a list of the medicines, vitamins, and herbs you take  Include the amounts, and when and why you take them  Bring the list or the pill bottles to follow-up visits  Carry your medicine list with you in case of an emergency  Manage your symptoms:   · Drink liquids as directed  to help prevent dehydration  Good liquids to drink include water, juice, and broth  Ask how much liquid to drink each day  You may need to drink an oral rehydration solution (ORS)  An ORS contains a balance of water, salt, and sugar to replace body fluids lost during diarrhea  · Eat a variety of healthy foods  Healthy foods include fruits, vegetables, whole-grain breads, beans, low-fat dairy products, lean meats, and fish  You may need to eat several small meals throughout the day instead of large meals  Avoid spicy foods, caffeine, chocolate, and foods high in fat  · Talk to your healthcare provider before you take NSAIDs  NSAIDs can cause worsen your symptoms if ulcers are causing your colitis  · Start to exercise when you feel better  Regular exercise helps your bowels work normally  Ask about the best exercise plan for you  Follow up with your healthcare provider as directed: You may need to return for a colonoscopy or other tests  Write down how often you have a bowel movements and what they look like  Bring this to your follow-up visits  Write down your questions so you remember to ask them during your visits  © 2017 [address] Information is for End User's use only and may not be sold, redistributed or otherwise used for commercial purposes  All illustrations and images included in CareNotes® are the copyrighted property of A [employer] M [employer]  or [address]  The above information is an  only  It is not intended as medical advice for individual conditions or treatments  Talk to your doctor, nurse or pharmacist before following any medical regimen to see if it is safe and effective for you        Rubber Band Ligation   WHAT YOU NEED TO KNOW:   Rubber band ligation (RBL) is an outpatient procedure to remove internal hemorrhoids  DISCHARGE INSTRUCTIONS:   Call 911 for any of the following:   · You suddenly feel lightheaded and short of breath  Seek care immediately if:   · You bleed from your anus, and you cannot get it to stop  · You have severe pain in your stomach or anus  · You cannot urinate, or you urinate very little  Contact your healthcare provider if:   · You have nausea or are vomiting  · You have a fever  · You have pain or trouble urinating or having a bowel movement  · You have questions or concerns about your condition or care  Medicines: You may need any of the following:  · NSAIDs , such as ibuprofen, help decrease swelling, pain, and fever  This medicine is available with or without a doctor's order  NSAIDs can cause stomach bleeding or kidney problems in certain people  If you take blood thinner medicine, always ask your healthcare provider if NSAIDs are safe for you  Always read the medicine label and follow directions  · Fiber supplements  1 to 2 times a day may help you have regular, soft bowel movements  You do not need a doctor's order for fiber supplements  They may be taken as a powder, pill, or chewable tablet  · Take your medicine as directed  Contact your healthcare provider if you think your medicine is not helping or if you have side effects  Tell him or her if you are allergic to any medicine  Keep a list of the medicines, vitamins, and herbs you take  Include the amounts, and when and why you take them  Bring the list or the pill bottles to follow-up visits  Carry your medicine list with you in case of an emergency  Warm sitz bath:  Fill a bathtub with 4 to 6 inches of warm water  You may also use a sitz bath pan that fits over a toilet  Sit in the sitz bath for 15 minutes  Do this 2 to 3 times a day, or as directed   The warm water can help decrease pain and swelling  Bowel movements:  Try to have a bowel movement within 48 hours  You may have a small amount of bleeding  Do not strain when you have a bowel movement  The following will help you have soft and regular bowel movements:  · Eat a variety of high fiber foods  High-fiber foods include fruits, vegetables, whole grains, and bran  · Drink liquids as directed  You may need to drink more liquids than usual  Water is the best liquid to drink  Ask how much liquid to drink each day and which liquids are best for you  · Exercise regularly  Ask about the best exercise plan for you  Return to work: You can usually return to work right away  Follow up with your healthcare provider as directed:  Write down your questions so you remember to ask them during your visits  © 2017 [facility] Information is for End User's use only and may not be sold, redistributed or otherwise used for commercial purposes  All illustrations and images included in CareNotes® are the copyrighted property of [employer] M [employer]  or Casey Diaz  The above information is an  only  It is not intended as medical advice for individual conditions or treatments  Talk to your doctor, nurse or pharmacist before following any medical regimen to see if it is safe and effective for you  Metronidazole (By mouth)   Metronidazole (met-mireya-RAMANA-da-zole)  Treats bacterial infections  Brand Name(s): Flagyl, Flagyl 375   There may be other brand names for this medicine  When This Medicine Should Not Be Used: This medicine is not right for everyone  Do not use if you had an allergic reaction to metronidazole or similar medicines  Do not use this medicine to treat trichomoniasis if you are in the first 3 months of pregnancy  How to Use This Medicine:   Capsule, Tablet, Long Acting Tablet  · Take this medicine as directed, and take it at the same time each day    · Capsule or Tablet: Take with food or milk to avoid stomach upset  · Extended-release tablet: Take it on an empty stomach, 1 hour before or 2 hours after a meal   · Swallow the extended-release tablet whole  Do not crush, break, or chew it  · Take all of the medicine in your prescription to clear up your infection, even if you feel better after the first few doses  · Missed dose: Take a dose as soon as you remember  If it is almost time for your next dose, wait until then and take a regular dose  Do not take extra medicine to make up for a missed dose  · Store the medicine in a closed container at room temperature, away from heat, moisture, and direct light  Drugs and Foods to Avoid:   Ask your doctor or pharmacist before using any other medicine, including over-the-counter medicines, vitamins, and herbal products  · Do not use this medicine if you have taken disulfiram within the last 2 weeks  Do not drink alcohol or use medicine that contains alcohol or propylene glycol while using this medicine and for at least 3 days after you have finished metronidazole treatment  · Some foods and medicines can affect how metronidazole works  Tell your doctor if you are using busulfan, cimetidine, lithium, phenobarbital, phenytoin, or warfarin or another blood thinner  Warnings While Using This Medicine:   · Tell your doctor if you are pregnant or breastfeeding, or if you have kidney disease, liver disease, blood or bone marrow problems, oral thrush, yeast infection, or a history of seizures  · This medicine may cause the following problems:  ¨ Brain or nervous system problems, including seizures, meningitis, or vision problems  · Trichomoniasis treatment:  Your doctor may want to also treat your sexual partner, even if he or she has no symptoms  Also, you may want to use a condom during sexual intercourse  These measures will help keep you from getting the infection back again from your partner   If you have any questions, ask your doctor  · Call your doctor if your symptoms do not improve or if they get worse  · Your doctor will do lab tests at regular visits to check on the effects of this medicine  Keep all appointments  · Tell any doctor or dentist who treats you that you are using this medicine  This medicine may affect certain medical test results  · Keep all medicine out of the reach of children  Never share your medicine with anyone  Possible Side Effects While Using This Medicine:   Call your doctor right away if you notice any of these side effects:  · Allergic reaction: Itching or hives, swelling in your face or hands, swelling or tingling in your mouth or throat, chest tightness, trouble breathing  · Confusion, drowsiness, fever, headache, loss of appetite, nausea or vomiting, stiff neck or back  · Dizziness, problems with muscle control, clumsiness, shakiness, trouble talking  · Fever, chills, cough, sore throat, and body aches  · Numbness, tingling, or burning pain in your hands, arms, legs, or feet  · Seizures  If you notice these less serious side effects, talk with your doctor:   · Mild nausea  · Unusual or unpleasant taste in your mouth  If you notice other side effects that you think are caused by this medicine, tell your doctor  Call your doctor for medical advice about side effects  You may report side effects to FDA at 7-199-FDA-1725  © 2017 [employer]  Information is for End User's use only and may not be sold, redistributed or otherwise used for commercial purposes  The above information is an  only  It is not intended as medical advice for individual conditions or treatments  Talk to your doctor, nurse or pharmacist before following any medical regimen to see if it is safe and effective for you  Lidocaine (On the skin)   Lidocaine ([id number])  Relieves pain and numbs the skin     Brand Name(s): Tura Justine, Astero, Burnamycin, Standard Pacific, Homesnap, Eha, Glydo, Good Sense Aloe With Lidocaine, Good Sense Burn Relief, LC-4, LC-5, LMX 4, LMX 5, Lido-K   There may be other brand names for this medicine  When This Medicine Should Not Be Used: This medicine is not right for everyone  Do not use it if you had an allergic reaction to lidocaine or similar medicines  How to Use This Medicine:   Cream, Foam, Gel/Jelly, Liquid, Lotion, Ointment, Pad, Spray  · Use this medicine as directed  Do not use more medicine or use it more often than your doctor tells you to  · Follow the instructions on the medicine label if you are using this medicine without a prescription  · Unless directed by your doctor, do not apply this medicine to open wounds, burns, broken or inflamed skin, or to a large area of skin  · Do not cover the treated area with a bandage unless directed by your doctor  · Do not eat or drink for at least 1 hour after you use this medicine in your mouth or throat  Do not chew gum or food while your mouth or throat feels numb  · Do not get this medicine in your eyes  If the medicine does get in your eyes, wash your eyes with water right away  · Missed dose: Apply a dose as soon as you can  If it is almost time for your next dose, wait until then and apply a regular dose  Do not apply extra medicine to make up for a missed dose  · Store the medicine in a closed container at room temperature, away from heat, moisture, and direct light  Drugs and Foods to Avoid:   Ask your doctor or pharmacist before using any other medicine, including over-the-counter medicines, vitamins, and herbal products  · Tell your doctor if you are using medicine for heart rhythm problems, such as mexiletine or amiodarone  Warnings While Using This Medicine:   · Tell your doctor if you are pregnant or breastfeeding, or if you have heart problems, porphyria, epilepsy, kidney disease, or liver disease    · Do not give lidocaine solution to a child younger than 3 years for teething pain, unless your doctor says it is okay  · Call your doctor if your symptoms do not improve or if they get worse  · Keep all medicine out of the reach of children  Never share your medicine with anyone  Possible Side Effects While Using This Medicine:   Call your doctor right away if you notice any of these side effects:  · Allergic reaction: Itching or hives, swelling in your face or hands, swelling or tingling in your mouth or throat, chest tightness, trouble breathing  · Blurred or double vision  · Confusion, dizziness, drowsiness, or lightheadedness  · Slow or uneven heartbeat  · Tremor or seizures  · Troubled or shallow breathing  If you notice other side effects that you think are caused by this medicine, tell your doctor  Call your doctor for medical advice about side effects  You may report side effects to FDA at 0-191-FDA-7209  © 2017 [employer] Valle Information is for End User's use only and may not be sold, redistributed or otherwise used for commercial purposes  The above information is an  only  It is not intended as medical advice for individual conditions or treatments  Talk to your doctor, nurse or pharmacist before following any medical regimen to see if it is safe and effective for you

## 2017-11-28 NOTE — DISCHARGE SUMMARY
Discharge Summary - Silvestre Mcclendon 73 y o  male MRN: [id number]    Unit/Bed#: [facility] Encounter: [id number]      Pre-Operative Diagnosis: Pre-Op Diagnosis Codes: * Benign neoplasm of colon [D12 6]     * Hemorrhoids [K64 9]    Post-Operative Diagnosis: Post-Op Diagnosis Codes: * Benign neoplasm of colon [D12 6]     * Hemorrhoids [K64 9]     * Colitis [K52 9]     * Diverticulosis [K57 90]     * Hemorrhoids [455]    Procedures Performed:  Procedure(s):  COLONOSCOPY with polypectomy    Surgeon: Delilah Weber MD    See H & P for full details of admission and Operative Note for full details of operations performed  Patient was seen and examined prior to discharge  Provisions for Follow-Up Care:  See After Visit Summary for information related to follow-up care and home orders  Disposition: Home, in stable condition  Planned Readmission: No    Discharge Medications:  See after visit summary for reconciled discharge medications provided to patient and family  Post Operative instructions: Reviewed with patient and/or family  Some portions of this record may have been generated with voice recognition software  There may be translation, syntax,  or grammatical errors  Occasional wrong word or "sound-a-like" substitutions may have occurred due to the inherent limitations of the voice recognition software  Read the chart carefully and recognize, using context, where substations may have occurred  If you have any questions, please contact the dictating provider for clarification or correction, as needed       Signature:   Delilah Weber MD  Date: 11/28/2017 Time: 12:10 PM

## 2017-11-28 NOTE — OP NOTE
COLONOSCOPY with polypectomy  Postoperative Note  PATIENT NAME: Selvin Laguerre  : 1950  MRN: [id number]  [facility] ROOM 01    Surgery Date: 2017      Pre operative diagnosis:   Benign neoplasm of colon [D12 6]  Hemorrhoids [K64 9]    Operative Indications:  Due for Colonoscopy      Consent:  The risks, benefits, and alternatives to the surgery were discussed with the patient and with the family prior to surgery if available, personally by Dr Jalil Cox  If the consent was obtained by the physician assistant or other representative, the consent was reviewed once again personally by the operating physician  Common complications particular for this procedure as well as unusual complications were discussed, including but not limited to:  bleeding, wound infection, prolonged wound healing, open wounds, reoperation, leak from the bowel or viscus, leak from the bile duct or injury to adjacent or other organs or blood vessels in the abdomen, and possible surgery or reoperation  A  was used if necessary  The patient expressed understanding of the issues discussed and wished and consented to the procedure to proceed  All questions were answered  Dr Jalil Cox personally discussed the informed consent with this patient  Post-Operative Diagnosis and Findings:     Diverticulosis and Hemorrhoids     Single polyp seen in Cecum     Moderate colitis in the sigmoid colon from 40-50 cm  Procedure:    Procedure(s):  COLONOSCOPY with polypectomy with Hot forcep polypectomy      Surgeon(s) and Role:     * Uziel Gonsalez MD - Primary    I was present for the entire procedure       Specimens:  ID Type Source Tests Collected by Time Destination   1 : 1cm cecal polyp, history of serrated polyp Tissue Colon TISSUE EXAM Uziel Gonsalez MD 2017 1150    2 : random sigmoid colon bx, R/O colitis Tissue Large Intestine, Sigmoid Colon TISSUE EXAM Uziel Gonsalez MD 2017 1154        Estimated Blood Loss:   Minimal    Anesthesia Type:   Choice     Procedure: The patient was seen in the Holding Room  The risks, benefits, complications, treatment options, and expected outcomes were discussed with the patient  The possibilities of reaction to medication, pulmonary aspiration, perforation of viscus, bleeding, recurrent infection, the need for additional procedures, failure to diagnose a condition, missed polyps, a complication requiring transfusion or operation were discussed with the patient  The patient concurred with the proposed plan, giving informed consent  The patient was taken to Endoscopy Suite, identified as [facility]  Staff confirmed patient name, , and procedure  A Time Out was held and the above information confirmed  A visual and digital exam was carried out of the anus and anal canal   Findings were normal unless specified below  The colonoscope was passed per anus and traversed to the cecum  The cecum was clearly visualized in the right lower quadrant by light reflex and palpation  The scope was withdrawn  There were mucosal lesion(s) and/or polyp(s) seen in the colon  These polyps were located at: cecum  The polyp(s) were addressed using polypectomy technique described above  There were diverticula scattered throughout the sigmoid colon and grade 1 and 2 hemorrhoids  There was short 10 cm segment of significant colitis possibly ischemic colitis between 40 and 50 cm of the sigmoid colon  This was randomly biopsied in multiple locations  No mass effect was seen  A photograph was taken  The scope was retroflexed  There were no anorectal lesions other than the hemorrhoids  The scope was removed  The patient tolerated the procedure well  There was some large internal hemorrhoids which were showing stigmata  of active bleeding  They were banded using the standard technique x2  Withdrawal time was greater than 9 minutes  Prep was good  at a 4/5         Some portions of this record may have been generated with voice recognition software  There may be translation, syntax,  or grammatical errors  Occasional wrong word or "sound-a-like" substitutions may have occurred due to the inherent limitations of the voice recognition software  Read the chart carefully and recognize, using context, where substations may have occurred  If you have any questions, please contact the dictating provider for clarification or correction, as needed         Complications: None    Condition: Stable to PACU    SIGNATURE: Bao Collazo MD   DATE: November 28, 2017   TIME: 12:09 PM

## 2017-11-28 NOTE — ANESTHESIA PREPROCEDURE EVALUATION
Review of Systems/Medical History  Patient summary reviewed  Chart reviewed      Cardiovascular  Exercise tolerance: good,  Hyperlipidemia, Hypertension , Valvular heart disease , aortic valve stenosis,    Pulmonary       GI/Hepatic  Negative GI/hepatic ROS          Negative  ROS        Endo/Other  Negative endo/other ROS      GYN  Negative gynecology ROS          Hematology  Negative hematology ROS      Musculoskeletal  Negative musculoskeletal ROS        Neurology  Negative neurology ROS      Psychology   Negative psychology ROS            Physical Exam    Airway    Mallampati score: II  TM Distance: <3 FB  Neck ROM: full     Dental       Cardiovascular  Rhythm: regular, Rate: normal,     Pulmonary  Breath sounds clear to auscultation,     Other Findings        Anesthesia Plan  ASA Score- 3       Anesthesia Type- IV sedation with anesthesia with ASA Monitors  Additional Monitors:   Airway Plan:           Induction- intravenous  Informed Consent- Anesthetic plan and risks discussed with patient

## 2017-11-28 NOTE — PRE-PROCEDURE INSTRUCTIONS
Endo process reviewed with pt  And spouse  Call bell in reach  Pt  Comfortable  Encouraged to use call bell for assistance

## 2017-12-04 ENCOUNTER — GENERIC CONVERSION - ENCOUNTER (OUTPATIENT)
Dept: OTHER | Facility: OTHER | Age: 67
End: 2017-12-04

## 2017-12-05 ENCOUNTER — GENERIC CONVERSION - ENCOUNTER (OUTPATIENT)
Dept: OTHER | Facility: OTHER | Age: 67
End: 2017-12-05

## 2018-01-09 NOTE — PROGRESS NOTES
Assessment   1  Never a smoker  2  Hypertension (401 9) (I10)  3  Elevated TSH (794 5) (R94 6)  4  Hyperlipidemia (272 4) (E78 5)  5  Vitamin D deficiency (268 9) (E55 9)    Plan  Elevated TSH, Hyperlipidemia, Hypertension    · (1) CBC/PLT/DIFF; Status:Active; Requested for:01Sep2017;    · (1) COMPREHENSIVE METABOLIC PANEL; Status:Active; Requested for:01Sep2017;    · (1) LIPID PANEL FASTING W DIRECT LDL REFLEX; Status:Active; Requested  for:01Sep2017;    · (1) TSH WITH FT4 REFLEX; Status:Active; Requested for:01Sep2017;   Hyperlipidemia    · There are many exercise options for seniors ; Status:Complete;   Done:1 1,2  65TKF0489;5 1,2   Need for pneumococcal vaccination    · Administered: Prevnar 13 Intramuscular Suspension  Screening for genitourinary condition    · *VB - Urinary Incontinence Screen (Dx Z13 89 Screen for UI); Status:Complete;   Done:  33DOR5598 01:32PM  Special screening examination for neoplasm of prostate    · (1) PSA (SCREEN) (Dx V76 44 Screen for Prostate Cancer); Status:Active; Requested  LGF:00YQO4656;      1 Amended By: Tasia Bethea; Mar 12 2017 11:38 PM EST   2 Amended By: Tasia Bethea; Mar 12 2017 11:38 PM EST    Discussion/Summary    1  HTN - well controlled, continue Diovan 160 mg qd  2  Hyperlipidemia - improved, , HDL 54, continue Atorvastatin 40 mg and Zetia 10 mg daily  3  Elevated TSH - s/s of hypothyroidism discussed, recheck TFT's in 6 months  4  Vitamin D deficiency - continue Vitamin D 400 IU and daily    RTO in 6 months with labs done prior  The treatment plan was reviewed with the patient/guardian   The patient/guardian understands and agrees with the treatment plan      Chief Complaint  Pt presents in the office today for a 6 month recheck for HTN, lipids, elevated TSH, and vit d def;    Colon due 11/19/2017  EKG due 09/29/2017       History of Present Illness  Pt presents for f/u chronic medical conditions and review her lab results, states he is feeling well and offers no complaints, he is complaint with his medications and reports no further episodes of visual disturbances  The patient presents for follow-up of primary hypertension  The patient states he has been doing well with his blood pressure control since the last visit  Symptoms: denies impaired vision, denies dyspnea, denies chest pain, denies intermittent leg claudication and denies lower extremity edema  Associated symptoms include no headache  Home monitoring: The patient checks his blood pressure sporadically  Blood pressure control has been good  Medications: the patient is adherent with his medication regimen  He denies medication side effects  Symptoms: denies chest pain, denies intermittent leg claudication, denies muscle pain and denies muscle weakness  Associated symptoms include no focal neurologic deficits and no memory loss  Lifestyle: Diet: He does not have a healthy diet  Exercise: He exercises regularly  Smoking: He does not use tobacco Drug Use: He denies drug use  Medications: the patient is adherent with his medication regimen  He denies medication side effects  the patient's LDL goal is < 130 mg/dL  the patient's last LDL was 116 mg/dL  Review of Systems    Constitutional: No fever or chills, feels well, no tiredness, no recent weight gain or weight loss  Cardiovascular: No complaints of slow heart rate, no fast heart rate, no chest pain, no palpitations, no leg claudication, no lower extremity  Respiratory: No complaints of shortness of breath, no wheezing, no cough, no SOB on exertion, no orthopnea or PND  Gastrointestinal: No complaints of abdominal pain, no constipation, no nausea or vomiting, no diarrhea or bloody stools  Genitourinary: no dysuria, no urinary hesitancy and no nocturia  Neurological: no headache, no numbness, no tingling, no dizziness, no limb weakness, no fainting and no difficulty walking       Preventive Quality 65 and Older: Falls Risk: The patient fell 0 times in the past 12 months  Active Problems   1  Abnormal finding on EKG (794 31) (R94 31)  2  Amaurosis fugax (362 34) (G45 3)  3  Aortic regurgitation (424 1) (I35 1)  4  Aortic stenosis (424 1) (I35 0)  5  Benign colon polyp (211 3) (K63 5)  6  Colon cancer screening (V76 51) (Z12 11)  7  Colonoscopy (Fiberoptic) Screening  8  Elevated TSH (794 5) (R94 6)  9  Hemorrhoids (455 6) (K64 9)  10  History of bicuspid aortic valve (V13 65) (Z87 74)  11  Hyperlipidemia (272 4) (E78 5)  12  Hypertension (401 9) (I10)  13  Lesion of skin of breast (611 9) (N64 9)  14  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)  15  Mitral regurgitation (424 0) (I34 0)  16  Need for pneumococcal vaccination (V03 82) (Z23)  17  Need for prophylactic vaccination and inoculation against influenza (V04 81) (Z23)  18  Onychomycosis of toenail (110 1) (B35 1)  19  Screening for genitourinary condition (V81 6) (Z13 89)  20  Special screening examination for neoplasm of prostate (V76 44) (Z12 5)  21  Visual disturbances (368 9) (H53 9)  22  Vitamin D deficiency (268 9) (E55 9)    Surgical History   1  History of Complete Colonoscopy  2  Denied: History Of Prior Surgery    Family History  Mother   1  Family history of Asthma (V17 5)  2  Family history of congestive heart failure (V17 49) (Z82 49)  3  Family history of Mother  At Age 76  Father   3  Family history of cerebrovascular accident (V17 1) (Z82 3)  5  Family history of Father  At Age 76  Brother   10  Family history of Brother  At Age 62  Family History   7  Family history of Stroke Syndrome (V17 1)    Social History    · Alcohol Use (History)   · Caffeine Use   · Denied: History of Drug Use   · Has smoke detectors   · Never a smoker   · Uses Safety Equipment - Seatbelts    Current Meds  1  Aspirin 81 MG Oral Tablet Delayed Release; take one tablet by mouth daily; Therapy: 93Pxi2355 to (Evaluate:16Vaf6761);  Last Rx:29Pwk8461 Ordered  2  Atorvastatin Calcium 40 MG Oral Tablet; take 1 tablet every day; Therapy: 18FSQ7195 to (Evaluate:65Otm2470)  Requested for: 47AED7368; Last   Rx:01Mar2017 Ordered  3  Centrum Silver TABS; TAKE 1 TABLET DAILY; Therapy: (Recorded:13Nov2012) to Recorded  4  Ezetimibe 10 MG Oral Tablet; TAKE 1 TABLET DAILY; Therapy: 65SQU5193 to (Evaluate:21Wjs1445); Last PRASAD:81TSA2994 Ordered  5  [place]-3 Fish Oil CAPS; TAKE 1 CAPSULE DAILY; Therapy: (Recorded:13Nov2012) to Recorded  6  Valsartan 160 MG Oral Tablet; take 1 tablet every day; Therapy: 21IXR9488 to (Evaluate:13May2017)  Requested for: 98STR8695; Last   Rx:82Yzw2586 Ordered  7  Vitamin D3 400 UNIT Oral Capsule; TAKE 1 CAPSULE DAILY; Therapy: 66EZF2058 to Recorded    Allergies   1  Sulfa Drugs   2  No Known Environmental Allergies  3  No Known Food Allergies    Vitals  Vital Signs    Recorded: 46AJM8313 01:30PM   Heart Rate 82   Respiration 16   Systolic 231   Diastolic 84   Height 5 ft 7 5 in   Weight 183 lb 9 6 oz   BMI Calculated 28 33   BSA Calculated 1 96     Physical Exam    Constitutional   General appearance: No acute distress, well appearing and well nourished  Pulmonary   Respiratory effort: No increased work of breathing or signs of respiratory distress  Auscultation of lungs: Clear to auscultation, equal breath sounds bilaterally, no wheezes, no rales, no rhonci  Cardiovascular   Auscultation of heart: Normal rate and rhythm, normal S1 and S2, without murmurs  Examination of extremities for edema and/or varicosities: Normal     Abdomen   Abdomen: Non-tender, no masses  Liver and spleen: No hepatomegaly or splenomegaly  Lymphatic   Palpation of lymph nodes in neck: No lymphadenopathy      Psychiatric   Orientation to person, place and time: Normal     Mood and affect: Normal          Results/Data  PHQ-2 Adult Depression Screening 64EEF1613 01:32PM User, s     Test Name Result Flag Reference   PHQ-2 Adult Depression Score 0 Over the last two weeks, how often have you been bothered by any of the following problems? Little interest or pleasure in doing things: Not at all - 0  Feeling down, depressed, or hopeless: Not at all - 0   PHQ-2 Adult Depression Screening Negative       *VB - Urinary Incontinence Screen (Dx Z13 89 Screen for UI) 54IAW3067 01:32PM Darlene Walker     Test Name Result Flag Reference   Urinary Incontinence Assessment 25TOJ1427       Falls Risk Assessment (Dx Z13 89 Screen for Neurologic Disorder) 45XAZ6960 01:31PM User, Ahs     Test Name Result Flag Reference   Falls Risk      No falls in the past year     (1) LIPID PANEL, FASTING 58Unu0666 08:08AM Yohana Geller Order Number: [id number]     Test Name Result Flag Reference   CHOLESTEROL 191 mg/dL     HDL,DIRECT 59 mg/dL  40-60   Specimen collection should occur prior to Metamizole administration due to the potential for falsely depressed results  LDL CHOLESTEROL CALCULATED 116 mg/dL H 0-100   - Patient Instructions: This is a fasting blood test  Water,black tea or black  coffee only after 9:00pm the night before test   Drink 2 glasses of water the morning of test     - Patient Instructions: This is a fasting blood test  Water,black tea or black  coffee only after 9:00pm the night before test Drink 2 glasses of water the morning of test   Triglyceride:         Normal              <150 mg/dl       Borderline High    150-199 mg/dl       High               200-499 mg/dl       Very High          >499 mg/dl  Cholesterol:         Desirable        <200 mg/dl      Borderline High  200-239 mg/dl      High             >239 mg/dl  HDL Cholesterol:        High    >59 mg/dL      Low     <41 mg/dL  LDL CALCULATED:    This screening LDL is a calculated result  It does not have the accuracy of the Direct Measured LDL in the monitoring of patients with hyperlipidemia and/or statin therapy     Direct Measure LDL (AAO152) must be ordered separately in these patients  TRIGLYCERIDES 79 mg/dL  <=150   Specimen collection should occur prior to N-Acetylcysteine or Metamizole administration due to the potential for falsely depressed results  (1) TSH WITH FT4 REFLEX 95Kmh5165 08:08AM Rodolfo Pagan Order Number: [id number]     Test Name Result Flag Reference   TSH 3 480 uIU/mL  0 358-3 740   - Patient Instructions: This is a fasting blood test  Water,black tea or black  coffee only after 9:00pm the night before test Drink 2 glasses of water the morning of test   Patients undergoing fluorescein dye angiography may retain small amounts of fluorescein in the body for 48-72 hours post procedure  Samples containing fluorescein can produce falsely depressed TSH values  If the patient had this procedure,a specimen should be resubmitted post fluorescein clearance  (1) COMPREHENSIVE METABOLIC PANEL 06SFD3728 77:17DU Rodolfo Pagan Order Number: [id number]     Test Name Result Flag Reference   GLUCOSE,RANDM 100 mg/dL     If the patient is fasting, the ADA then defines impaired fasting glucose as > 100 mg/dL and diabetes as > or equal to 123 mg/dL  SODIUM 138 mmol/L  136-145   POTASSIUM 4 3 mmol/L  3 5-5 3   CHLORIDE 103 mmol/L  100-108   CARBON DIOXIDE 28 mmol/L  21-32   ANION GAP (CALC) 7 mmol/L  4-13   BLOOD UREA NITROGEN 11 mg/dL  5-25   CREATININE 0 77 mg/dL  0 60-1 30   Standardized to IDMS reference method   CALCIUM 9 2 mg/dL  8 3-10 1   BILI, TOTAL 0 47 mg/dL  0 20-1 00   ALK PHOSPHATAS 83 U/L     ALT (SGPT) 42 U/L  12-78   AST(SGOT) 17 U/L  5-45   ALBUMIN 3 7 g/dL  3 5-5 0   TOTAL PROTEIN 7 5 g/dL  6 4-8 2   eGFR Non-African American      >60 0 ml/min/1 73sq m   - Patient Instructions:  This is a fasting blood test  Water,black tea or black  coffee only after 9:00pm the night before test Drink 2 glasses of water the morning of test   National Kidney Disease Education Program recommendations are as follows:  GFR calculation is accurate only with a steady state creatinine  Chronic Kidney disease less than 60 ml/min/1 73 sq  meters  Kidney failure less than 15 ml/min/1 73 sq  meters  Health Management  Colonoscopy (Fiberoptic) Screening   COLONOSCOPY; every 1 year; Last 59RXA5372; Next Due: 29Nov2017; Active  Hypertension   EKG/ECG- POC; every 1 year; Last 29ABT8814; Next Due: 32VWE4421; Active    Future Appointments    Date/Time Provider Specialty Site   09/18/2017 01:30 PM YANIQUE Alex   [address]     Signatures   Electronically signed by : YANIQUE Sands ; Mar 12 2017 11:37PM EST                       (Author)    Electronically signed by : YANIQUE Sands ; Mar 12 2017 11:39PM EST                       (Author)

## 2018-01-11 NOTE — MISCELLANEOUS
Message   Recorded as Task   Date: 12/05/2016 02:32 PM, Created By: Nicole Gutierrez   Task Name: Call Back   Assigned To: [facility],Team   Regarding Patient: Regina Mendoza, Status: Active   CommentCleve Isaac - 05 Dec 2016 2:32 PM     TASK CREATED  Please call patients with these results  Showed tubular adenoma with sessile serrated adenoma  That combined with a large villous polyp, this patient needs a 1 year follow-up colonoscopy  [facility] - 05 Dec 2016 3:16 PM     TASK EDITED  Call pt and informed him that the polyps that were removed was a tubular adenoma with sessile serrated adenoma  He will need a follow-up colono in 1 year due to the type of polyp  Those types can change over time and that is why it is recommended for a earlier follow-up  A reminder letter will be sent in the mail shortly but he will need to call in 1 year to schedule an appt  Pt verbalized understanding and thanked me  Active Problems    1  Abnormal finding on EKG (794 31) (R94 31)   2  Amaurosis fugax (362 34) (G45 3)   3  Aortic regurgitation (424 1) (I35 1)   4  Aortic stenosis (424 1) (I35 0)   5  Benign colon polyp (211 3) (K63 5)   6  Colon cancer screening (V76 51) (Z12 11)   7  Colonoscopy (Fiberoptic) Screening   8  Elevated TSH (794 5) (R94 6)   9  Hemorrhoids (455 6) (K64 9)   10  History of bicuspid aortic valve (V13 65) (Z87 74)   11  Hyperlipidemia (272 4) (E78 5)   12  Hypertension (401 9) (I10)   13  Lesion of skin of breast (611 9) (N64 9)   14  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)   15  Mitral regurgitation (424 0) (I34 0)   16  Need for prophylactic vaccination and inoculation against influenza (V04 81) (Z23)   17  Onychomycosis of toenail (110 1) (B35 1)   18  Special screening examination for neoplasm of prostate (V76 44) (Z12 5)   19  Visual disturbances (368 9) (H53 9)   20  Vitamin D deficiency (268 9) (E55 9)    Current Meds   1   Aspirin 81 MG Oral Tablet Delayed Release; take one tablet by mouth daily; Therapy: 36Pcv8219 to (Evaluate:12Apr2017); Last Rx:97Kdy6033 Ordered   2  Atorvastatin Calcium 40 MG Oral Tablet; take 1 tablet every day; Therapy: 58TWV2985 to (Evaluate:26Jan2017)  Requested for: 42HUS2825; Last   Rx:52Bbz2213 Ordered   3  Centrum Silver TABS; TAKE 1 TABLET DAILY; Therapy: (Recorded:13Nov2012) to Recorded   4  [place]-3 Fish Oil CAPS; TAKE 1 CAPSULE DAILY; Therapy: (Recorded:13Nov2012) to Recorded   5  Valsartan 160 MG Oral Tablet; take 1 tablet every day; Therapy: 32EQO1497 to (Evaluate:13May2017)  Requested for: 55QJL5525; Last   Rx:48Yei1424 Ordered   6  Vitamin D3 400 UNIT Oral Capsule; TAKE 1 CAPSULE DAILY; Therapy: 45TJW4523 to Recorded   7  Zetia 10 MG Oral Tablet; TAKE 1 TABLET DAILY; Therapy: 14IXI0206 to (Evaluate:32Vkv6372); Last Rx:63Lkn6601 Ordered    Allergies    1  Sulfa Drugs    2  No Known Environmental Allergies   3   No Known Food Allergies    Signatures   Electronically signed by : Julienne Camejo, ; Dec  5 2016  3:17PM EST                       (Author)

## 2018-01-13 VITALS
SYSTOLIC BLOOD PRESSURE: 110 MMHG | TEMPERATURE: 97.1 F | WEIGHT: 178.25 LBS | BODY MASS INDEX: 26.4 KG/M2 | HEART RATE: 78 BPM | RESPIRATION RATE: 16 BRPM | DIASTOLIC BLOOD PRESSURE: 72 MMHG | HEIGHT: 69 IN

## 2018-01-13 VITALS
HEIGHT: 68 IN | SYSTOLIC BLOOD PRESSURE: 110 MMHG | HEART RATE: 82 BPM | WEIGHT: 183.6 LBS | BODY MASS INDEX: 27.83 KG/M2 | RESPIRATION RATE: 16 BRPM | DIASTOLIC BLOOD PRESSURE: 84 MMHG

## 2018-01-13 VITALS
DIASTOLIC BLOOD PRESSURE: 80 MMHG | HEART RATE: 65 BPM | WEIGHT: 183 LBS | SYSTOLIC BLOOD PRESSURE: 120 MMHG | BODY MASS INDEX: 28.72 KG/M2 | HEIGHT: 67 IN

## 2018-01-13 VITALS
BODY MASS INDEX: 28.56 KG/M2 | HEIGHT: 67 IN | RESPIRATION RATE: 16 BRPM | TEMPERATURE: 98.9 F | SYSTOLIC BLOOD PRESSURE: 130 MMHG | DIASTOLIC BLOOD PRESSURE: 82 MMHG | WEIGHT: 182 LBS | HEART RATE: 78 BPM

## 2018-01-13 NOTE — RESULT NOTES
Verified Results  HOLTER MONITOR - Vera Pendleton 115 [id number] 06:42AM [place] Order Number: [id number]    - Patient Instructions: To schedule this appointment, please contact Central Scheduling at [phone]  For Sonny June, please call [phone]   Order Number: [id number]    - Patient Instructions: To schedule this appointment, please contact Central Scheduling at [phone]  For Sonny June, please call [phone]  Test Name Result Flag Reference   HOLTER MONITOR - 50 HOUR (Report)     PT NAME: Selvin Laguerre   : 1950 AGE: 60 y o  GENDER: male   MRN: [id number]  PROCEDURE: Holter monitor - 48 hour         INDICATIONS:    A Holter monitor - 48 hour was obtained on 10/18/16 for the reason of history of arrhythmia  Minimum heart rate 45 bpm at 1:26 AM consistent with sinus bradycardia  Average heart rate 67 bpm and the patient was in a sinus mechanism the entire study  Maximum heart rate 106 bpm at 8:28 PM consistent with sinus tachycardia  There were 38 premature ventricular contractions  No other notable ventricular arrhythmias  There were 14 premature atrial contractions  The longest RR interval was 1 8 seconds  No other notable arrhythmias  No symptoms per the diary  Unremarkable 48 hour Holter monitor  Rare premature ventricular contractions, rare premature atrial contractions  No symptoms per the diary         Interpretation by   Sky LUCAS

## 2018-01-14 VITALS
RESPIRATION RATE: 16 BRPM | DIASTOLIC BLOOD PRESSURE: 82 MMHG | HEIGHT: 68 IN | BODY MASS INDEX: 27.07 KG/M2 | WEIGHT: 178.6 LBS | SYSTOLIC BLOOD PRESSURE: 120 MMHG | HEART RATE: 82 BPM

## 2018-01-14 NOTE — RESULT NOTES
Verified Results  * NM MYOCARDIAL PERFUSION SPECT (STRESS AND/OR REST) [id number] 06:41AM Luanne Ewing     Test Name Result Flag Reference   NM MYOCARDIAL PERFUSION SPECT (STRESS AND/OR REST) (Report)     [address]   [phone]     Rest/Stress Gated SPECT Myocardial Perfusion Imaging After Exercise     Patient: Jung Leon   MR number: [id number]   Account number: [de-identified]   : 1950   Age: 60 years   Gender: Male   Status: Outpatient   Location: [address] [place] [address] [facility] [place]   Height: 69 in   Weight: 175 lb   BP: 138/ 80 mmHg     Allergies: SULFA ANTIBIOTICS     Referring Physician: King iTo DO   Primary Physician: Kaity Killian MD   Technician: Cathy Cohen   RN: Brenda Cisse RN   Group: [facility]   Report Prepared by[de-identified] Brenda Cisse RN   Interpreting Physician: Judi Santiago MD     INDICATIONS: Detection of coronary artery disease  HISTORY: The patient is a 60year old  male  Chest pain status: no   chest pain  Coronary artery disease risk factors: dyslipidemia and   hypertension  Cardiovascular history: aortic valve disease  PHYSICAL EXAM: Baseline physical exam screening: normal      REST ECG: Normal baseline ECG  PROCEDURE: The study was performed at the [address]  Treadmill exercise testing was performed, using the João protocol  Gated SPECT   myocardial perfusion imaging was performed after stress  Systolic blood   pressure was 138 mmHg, at the start of the study  Diastolic blood pressure was   80 mmHg, at the start of the study  The heart rate was 59 bpm, at the start of   the study  IV double checked       JOÃO PROTOCOL:   HR bpm SBP mmHg DBP mmHg Symptoms   Baseline 59 138 80 none   Stage 1 95 142 90 --   Stage 2 115 158 90 mild fatigue   Stage 3 144 164 98 moderate fatigue, leg pain   Recovery 1 04 911 82 subsiding   Recovery 2 86 162 82 none   No medications or fluids given  STRESS SUMMARY: Duration of exercise was 9 min  The patient exercised to   protocol stage 3  Maximal work rate was 10 1 METs  Maximal heart rate during   stress was 144 bpm ( 92 % of maximal predicted heart rate)  The heart rate   response to stress was normal  There was normal resting blood pressure with an   appropriate response to stress  The rate-pressure product for the peak heart   rate and blood pressure was 34507  There was no chest pain during stress  The   stress test was terminated due to leg pain and moderate fatigue  Pre oxygen   saturation: 98 %  Peak oxygen saturation: 99 %  There were no stress   arrhythmias or conduction abnormalities  ISOTOPE ADMINISTRATION:   The radiopharmaceutical was injected one minute before the end of exercise  MYOCARDIAL PERFUSION IMAGING:   The image quality was good  Left ventricular size was normal  The TID ratio was     97  PERFUSION DEFECTS:   - There were no perfusion defects  GATED SPECT:   The calculated left ventricular ejection fraction was 67 %  Left ventricular   ejection fraction was within normal limits by visual estimate  There was no   left ventricular regional abnormality  RESTING BLOOD POOL IMAGING RESULTS:   Left ventricular ejection fraction was 67 % at rest      BLOOD POOL IMAGING CONCLUSIONS   Left ventricular ejection fraction and regional wall motion were normal at rest   and with stress  SUMMARY:   - Stress results: Duration of exercise was 9 min  Target heart rate was   achieved  There was no chest pain during stress  - Perfusion imaging: There were no perfusion defects    - Gated SPECT: The calculated left ventricular ejection fraction was 67 %  Left ventricular ejection fraction was within normal limits by visual estimate     There was no left ventricular regional abnormality  - Blood pool imaging: Left   ventricular ejection fraction and regional wall motion were normal at rest and   with stress  IMPRESSIONS: Normal study  Myocardial perfusion imaging was normal at rest and   with stress       Prepared and signed by     Vkii Jones MD   Signed 10/18/2016 11:44:15

## 2018-01-14 NOTE — MISCELLANEOUS
Message  Mailed colono letter to patients home address  Tasked PCPs office  {Updated pre-visit planning to reflect 1 year follow up due to polyps  }      Active Problems    1  Abnormal finding on EKG (794 31) (R94 31)   2  Amaurosis fugax (362 34) (G45 3)   3  Aortic regurgitation (424 1) (I35 1)   4  Aortic stenosis (424 1) (I35 0)   5  Benign colon polyp (211 3) (K63 5)   6  Colon cancer screening (V76 51) (Z12 11)   7  Colonoscopy (Fiberoptic) Screening   8  Elevated TSH (794 5) (R94 6)   9  Hemorrhoids (455 6) (K64 9)   10  History of bicuspid aortic valve (V13 65) (Z87 74)   11  Hyperlipidemia (272 4) (E78 5)   12  Hypertension (401 9) (I10)   13  Lesion of skin of breast (611 9) (N64 9)   14  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)   15  Mitral regurgitation (424 0) (I34 0)   16  Need for prophylactic vaccination and inoculation against influenza (V04 81) (Z23)   17  Onychomycosis of toenail (110 1) (B35 1)   18  Special screening examination for neoplasm of prostate (V76 44) (Z12 5)   19  Visual disturbances (368 9) (H53 9)   20  Vitamin D deficiency (268 9) (E55 9)    Current Meds   1  Aspirin 81 MG Oral Tablet Delayed Release; take one tablet by mouth daily; Therapy: 08Viq7983 to (Evaluate:12Apr2017); Last Rx:11Qge6078 Ordered   2  Atorvastatin Calcium 40 MG Oral Tablet; take 1 tablet every day; Therapy: 00LPU0744 to (Evaluate:26Jan2017)  Requested for: 04CGH5445; Last   Rx:10Qjc9026 Ordered   3  Centrum Silver TABS; TAKE 1 TABLET DAILY; Therapy: (Recorded:13Nov2012) to Recorded   4  [place]-3 Fish Oil CAPS; TAKE 1 CAPSULE DAILY; Therapy: (Recorded:13Nov2012) to Recorded   5  Valsartan 160 MG Oral Tablet; take 1 tablet every day; Therapy: 34YDA4355 to (Evaluate:13May2017)  Requested for: 84WFK8526; Last   Rx:62Ozx6555 Ordered   6  Vitamin D3 400 UNIT Oral Capsule; TAKE 1 CAPSULE DAILY; Therapy: 89EZF4383 to Recorded   7  Zetia 10 MG Oral Tablet; TAKE 1 TABLET DAILY;    Therapy: 50IPA9952 to (Evaluate:84Neo9020); Last Rx:42Vxg4217 Ordered    Allergies    1  Sulfa Drugs    2  No Known Environmental Allergies   3  No Known Food Allergies    Plan  Colonoscopy (Fiberoptic) Screening    · COLONOSCOPY ; every 1 year;  Last 43AEV2118; Next 99CAO6406; Status:Active    Signatures   Electronically signed by : Delia Siu, ; Dec  8 2016  9:28AM EST                       (Author)

## 2018-01-17 NOTE — MISCELLANEOUS
Message   Date: 19 Oct 2016 1:54 PM EST, Recorded By: Stephanie lGoria For: [facility],Team   Patient called office to schedule follow-up 3 year colono  Last one 11/5/2013 performed by Dr Yanez  Scheduled appointment for 10/25/16 at 9:30am at the [facility] office  Patient aware to bring insurance card, photo ID, and co-pay  Active Problems    1  Abnormal finding on EKG (794 31) (R94 31)   2  Amaurosis fugax (362 34) (G45 3)   3  Aortic regurgitation (424 1) (I35 1)   4  Aortic stenosis (424 1) (I35 0)   5  Benign colon polyp (211 3) (K63 5)   6  Colonoscopy (Fiberoptic) Screening   7  Elevated TSH (794 5) (R94 6)   8  History of bicuspid aortic valve (V13 65) (Z87 74)   9  Hyperlipidemia (272 4) (E78 5)   10  Hypertension (401 9) (I10)   11  Lesion of skin of breast (611 9) (N64 9)   12  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)   13  Mitral regurgitation (424 0) (I34 0)   14  Need for prophylactic vaccination and inoculation against influenza (V04 81) (Z23)   15  Onychomycosis of toenail (110 1) (B35 1)   16  Special screening examination for neoplasm of prostate (V76 44) (Z12 5)   17  Visual disturbances (368 9) (H53 9)   18  Vitamin D deficiency (268 9) (E55 9)    Current Meds   1  Aspirin 81 MG Oral Tablet Delayed Release; take one tablet by mouth daily; Therapy: 74Omp6189 to (Evaluate:12Apr2017); Last Rx:73Twp6588 Ordered   2  Atorvastatin Calcium 40 MG Oral Tablet; take 1 tablet every day; Therapy: 47IAG4773 to (Evaluate:26Jan2017)  Requested for: 94OVZ0195; Last   Rx:34Hyc0998 Ordered   3  Centrum Silver TABS; TAKE 1 TABLET DAILY; Therapy: (Recorded:13Nov2012) to Recorded   4  [place]-3 Fish Oil CAPS; TAKE 1 CAPSULE DAILY; Therapy: (Recorded:13Nov2012) to Recorded   5  Valsartan 160 MG Oral Tablet; take 1 tablet every day; Therapy: 08IPS4295 to (Evaluate:13May2017)  Requested for: 20RPQ0291; Last   Rx:18May2016 Ordered   6   Vitamin D3 400 UNIT Oral Capsule; TAKE 1 CAPSULE DAILY; Therapy: 04NUY5510 to Recorded   7  Zetia 10 MG Oral Tablet; TAKE 1 TABLET DAILY; Therapy: 98CVS4088 to (Evaluate:61Uwi6491); Last Rx:61Txg3123 Ordered    Allergies    1  Sulfa Drugs    2  No Known Environmental Allergies   3   No Known Food Allergies    Signatures   Electronically signed by : Amy Brasher, ; Oct 19 2016  1:55PM EST                       (Author)

## 2018-01-22 ENCOUNTER — ALLSCRIPTS OFFICE VISIT (OUTPATIENT)
Dept: OTHER | Facility: OTHER | Age: 68
End: 2018-01-22

## 2018-01-22 DIAGNOSIS — K52.9 NONINFECTIVE GASTROENTERITIS AND COLITIS: ICD-10-CM

## 2018-01-23 NOTE — MISCELLANEOUS
Message   Recorded as Task   Date: 11/29/2017 09:47 AM, Created By: Riley Macedo   Task Name: Follow Up   [facility]   Regarding Patient: Leif Ness, Status: In Progress   Konstantinnegritadaniela Nicolasa - 29 Nov 2017 9:47 AM     TASK CREATED  Spoke w/ pt and he is doing well  Having some discomfort from the banding of roids, but otherwise is fine  Advised to take apap or motrin if needed  Returned to everyday adl's  Advised to contact office w/ questions or concerns  Pt aware of pending pathology  Riley Macedo - 29 Nov 2017 3:49 PM     TASK EDITED   Riley Macedo - 04 Dec 2017 2:12 PM     TASK EDITED  Pathology results back Dr Oliva Lr reviewed and there was surface erosion suggestive of ischemic damage  Pt to f/u with GI  Spoke w/ pt made aware and he wants to discuss w/ Dr Oliva Lr further  Appt made  Active Problems    1  Abnormal finding on EKG (794 31) (R94 31)   2  Amaurosis fugax (362 34) (G45 3)   3  Aortic regurgitation (424 1) (I35 1)   4  Aortic stenosis (424 1) (I35 0)   5  Colon cancer screening (V76 51) (Z12 11)   6  Colonoscopy (Fiberoptic) Screening   7  Elevated TSH (794 5) (R94 6)   8  Hemorrhoids (455 6) (K64 9)   9  History of bicuspid aortic valve (V13 65) (Z87 74)   10  Hyperlipidemia (272 4) (E78 5)   11  Hypertension (401 9) (I10)   12  Lesion of skin of breast (611 9) (N64 9)   13  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)   14  Mitral regurgitation (424 0) (I34 0)   15  Need for hepatitis C screening test (V73 89) (Z11 59)   16  Need for pneumococcal vaccination (V03 82) (Z23)   17  Need for prophylactic vaccination and inoculation against influenza (V04 81) (Z23)   18  Onychomycosis of toenail (110 1) (B35 1)   19  Screening for genitourinary condition (V81 6) (Z13 89)   20  Serrated adenoma of colon (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 3) (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 6)   21  Special screening examination for neoplasm of prostate (V76 44) (Z12 5)   22   Visual disturbances (368 9) (H53 9)   23  Vitamin D deficiency (268 9) (E55 9)    Current Meds   1  Analpram-HC 1-1 % Rectal Cream; USE AS DIRECTED; Therapy: 85FYP7742 to (Gomez Michel)  Requested for: 98MAW3687 Ordered   2  Aspirin 81 MG Oral Tablet Delayed Release; take one tablet by mouth daily; Therapy: 18QAE6515 to (Evaluate:13Jun2018) Recorded   3  Atorvastatin Calcium 40 MG Oral Tablet; take 1 tablet every day; Therapy: 98YEZ8038 to (Evaluate:95Ebh2013)  Requested for: 00ZKP6158; Last   Rx:01Mar2017 Ordered   4  Centrum Silver TABS; TAKE 1 TABLET DAILY; Therapy: (Recorded:13Nov2012) to Recorded   5  [place]-3 Fish Oil CAPS; TAKE 1 CAPSULE DAILY; Therapy: (Recorded:13Nov2012) to Recorded   6  Travatan Z 0 004 % Ophthalmic Solution; Therapy: 70ZJX7048 to Recorded   7  Valsartan 160 MG Oral Tablet; take 1 tablet every day; Therapy: 83KEV2354 to (Danya Alexander)  Requested for: 27ZEK5782; Last   Rx:23Hts0844 Ordered   8  Vitamin D3 400 UNIT Oral Capsule; TAKE 1 CAPSULE DAILY; Therapy: 63OER3200 to Recorded   9  Zetia 10 MG Oral Tablet (Ezetimibe); TAKE 1 TABLET DAILY; Therapy: (Recorded:10Nov2017) to Recorded    Allergies    1  Sulfa Drugs    2  No Known Environmental Allergies   3   No Known Food Allergies    Signatures   Electronically signed by : Aziza Lin, ; Dec  4 2017  2:13PM EST                       (Author)

## 2018-01-23 NOTE — MISCELLANEOUS
Message  Discussed path on colonoscopy with patient and wife at great length  The ischemic changes are asymptomatic at this time  He has no blood in his stools or bloody stools  He has been on and off some hypertensive medications and perhaps these caused a low-flow state resulting in skin the colitis  We discussed the differential diagnosis at length  There is no evidence of malignancy  Recommend follow-up Gastroenterology to review his medication list and lifestyle habits to ensure that this is prevented from worsening or recurring  He understands and given the phone number to follow up with GI        Signatures   Electronically signed by : Aleta Lopez MD; Dec  7 2017  9:46AM EST                       (Author)

## 2018-01-23 NOTE — CONSULTS
Assessment   1  Acute colitis (558 9) (K52 9)  2  Blood in the stool (578 1) (K92 1)    Plan   Acute colitis    · Start: Suprep Bowel Prep Kit 17 5-3 13-1 6 GM/180ML Oral Solution; USE AS DIRECTED  Rx By: Meagan Guadalupen; Dispense: 0 Days ; #:1 ML; Refill: 0;For: Acute colitis; MAR = N;     Verified Transmission to [id number] Last Updated By: System, [employer];     1/22/2018 11:29:57 AM   · Start: Suprep Bowel Prep Kit 17 5-3 13-1 6 GM/180ML Oral Solution; USE AS DIRECTED  Rx By: Meagan Medin; Dispense: 0 Days ; #:1 ML; Refill: 0;For: Acute colitis; MAR = N; Rx     auto-faxed to [id number] Last Updated By: System, [employer]; 1/22/2018     11:40:47 AM   · (1) BASIC METABOLIC PROFILE; Status:Active; Requested for:22Jan2018; Perform:[facility]; AIL:[id number]; For:Acute colitis; Ordered     By:Virgilio Rhodes;   · (1) CALPROTECTIN, FECAL; Status:Active; Requested for:22Jan2018; Perform:[facility]; DNE:[id number];SVCRZOF; For:Acute colitis; Ordered     By:Virgilio Rhodes;   · (1) LACTIC ACID; Status:Active; Requested for:22Jan2018; Perform:[facility]; YXN:[id number];AFOEHDQ; For:Acute colitis; Ordered     By:Virgilio Rhodes;   · * CT ABDOMEN PELVIS W CONTRAST; Status:Hold For - Scheduling; Requested    for:22Jan2018; Perform:[facility]; Order Comments:CT angiogram; FUF:[id number];GAZLHOA; For:Acute colitis; Ordered By:Virgilio Rhodes;   · COLONOSCOPY (GI, SURG); Status:Hold For - Scheduling; Requested Darrell Chauhan; Perform:[facility]; LKJ:[id number];XVHCMOJ; For:Acute colitis; Ordered By:Virgilio Rhodes;    Discussion/Summary   Discussion Summary:    Rio Swift is a 79 yo Recent colonoscopy in November 2017 which showed focal area of colitis suspected to be ischemic in origin  No clear cause of ischemia is identified based on history   He did also had 1 other episode of 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 which was suspected to be ischemic in nature as well several years ago  He has had an echo it is unclear this was a bubble study  Otherwise overall doing well without any issues or concerns at this time  Left-sided ischemia needs further evaluation with repeat colonoscopy, CTA for further evaluation of atherosclerosis, lactate levels and fecal calprotectin  Unlikely this is secondary to inflammatory bowel disease as he had a colonoscopy in 2016 besides colonic polyp was within normal limits  He does have history of hemorrhoids but they seem to be well controlled at this time  At any acute issues are discomfort at this time  Further management follow-up based on above workup  Will also recheck the cardiologist to see if he will benefit from but study  Chief Complaint   Chief Complaint Free Text Note Form: consult for blood in stool and some abnormal finding seen in colonoscopy ref by Dr Giovanna Donohue      History of Present Illness   HPI: Patient is a 69-year-old male presents here for evaluation of focal colitis was seen on colonoscopy in November of 2017  he had a colonoscopy done in 2016 which was significant for hemorrhoids and colonic polyps  he denies having dizziness, lightheadedness or hypertension or any other precipitating factors which could lead to ischemic colitis  Biopsies were consistent with ischemia from the area of focal colitis  Cause of this is unclear  He has not had any recent CT scan evaluation  Several years ago he also had transient blindness to the left eye thought to be secondary to ischemia  Echo study was not with bubbles unclear if he has of PFO  1  He did have an echo done in 2016 besides minor valve (MR and AS) did not have any abnormalities  Currently doing well without any acute issues  1 Amended By: Hugh Mcbride; Jan 22 2018 11:59 AM EST      Review of Systems   Complete-Male GI Adult:      Constitutional: No fever or chills, feels well, no tiredness, no recent weight gain or weight loss        Eyes: No complaints of eye pain, no red eyes, no discharge from eyes, no itchy eyes  ENT: no complaints of earache, no hearing loss, no nosebleeds, no nasal discharge, no sore throat, no hoarseness  Cardiovascular: No complaints of slow heart rate, no fast heart rate, no chest pain, no palpitations, no leg claudication, no lower extremity  Respiratory: No complaints of shortness of breath, no wheezing, no cough, no SOB on exertion, no orthopnea or PND  Gastrointestinal: bloody stools, but-- as noted in HPI  Genitourinary: No complaints of dysuria, no incontinence, no hesitancy, no nocturia, no genital lesion, no testicular pain  Musculoskeletal: No complaints of arthralgia, no myalgias, no joint swelling or stiffness, no limb pain or swelling  Integumentary: No complaints of skin rash or skin lesions, no itching, no skin wound, no dry skin  Neurological: No compliants of headache, no confusion, no convulsions, no numbness or tingling, no dizziness or fainting, no limb weakness, no difficulty walking  Psychiatric: Is not suicidal, no sleep disturbances, no anxiety or depression, no change in personality, no emotional problems  Endocrine: No complaints of proptosis, no hot flashes, no muscle weakness, no erectile dysfunction, no deepening of the voice, no feelings of weakness  Hematologic/Lymphatic: No complaints of swollen glands, no swollen glands in the neck, does not bleed easily, no easy bruising  ROS Reviewed:    ROS reviewed  Active Problems   1  Abnormal finding on EKG (794 31) (R94 31)  2  Amaurosis fugax (362 34) (G45 3)  3  Aortic regurgitation (424 1) (I35 1)  4  Aortic stenosis (424 1) (I35 0)  5  Blood in the stool (578 1) (K92 1)  6  Colon cancer screening (V76 51) (Z12 11)  7  Colonoscopy (Fiberoptic) Screening  8  Elevated TSH (794 5) (R94 6)  9  Hemorrhoids (455 6) (K64 9)  10  History of bicuspid aortic valve (V13 65) (Z87 74)  11   Hyperlipidemia (272  4) (E78 5)  12  Hypertension (401 9) (I10)  13  Lesion of skin of breast (611 9) (N64 9)  14  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)  15  Mitral regurgitation (424 0) (I34 0)  16  Need for hepatitis C screening test (V73 89) (Z11 59)  17  Need for pneumococcal vaccination (V03 82) (Z23)  18  Need for prophylactic vaccination and inoculation against influenza (V04 81) (Z23)  19  Onychomycosis of toenail (110 1) (B35 1)  20  Screening for genitourinary condition (V81 6) (Z13 89)  21  Serrated adenoma of colon (211 3) (D12 6)  22  Special screening examination for neoplasm of prostate (V76 44) (Z12 5)  23  Visual disturbances (368 9) (H53 9)  24  Vitamin D deficiency (268 9) (E55 9)    Past Medical History   1  Hemorrhoids (455 6) (K64 9)  2  History of colonic polyps (V12 72) (Z86 010)  3  History of diverticulosis (V12 79) (Z87 19)  4  History of hyperlipidemia (V12 29) (Z86 39)  5  History of hypertension (V12 59) (Z86 79)  6  History of Murmur (785 2) (R01 1)  7  Need for prophylactic vaccination and inoculation against influenza (V04 81) (Z23)  Active Problems And Past Medical History Reviewed: The active problems and past medical history were reviewed and updated today  Surgical History   1  History of Complete Colonoscopy  2  Denied: History Of Prior Surgery  Surgical History Reviewed: The surgical history was reviewed and updated today  Family History   Mother   1  Family history of Asthma (V17 5)  2  Family history of congestive heart failure (V17 49) (Z82 49)  3  Family history of depression (V17 0) (Z81 8)  4  Family history of Mother  At Age 76  Father   11  Family history of alcohol abuse (V61 41) (Z81 1)  6  Family history of cerebrovascular accident (V17 1) (Z82 3)  7  Family history of Father  At Age 76  Sister   6  Family history of thyroid disease (V18 19) (Z83 49)  Brother   5  Family history of Brother  At Age 62  Family History   10   Family history of Stroke Syndrome (V17 1)  Family History Reviewed: The family history was reviewed and updated today  Social History    · Alcohol Use (History)   · Caffeine Use   · Denied: History of Drug Use   · Has smoke detectors   · Never a smoker   · Uses Safety Equipment - Seatbelts  Social History Reviewed: The social history was reviewed and updated today  Current Meds   1  Analpram-HC 1-1 % Rectal Cream; USE AS DIRECTED; Therapy: 13AFH7110 to (Gomez Jannick Camacho)  Requested for: 93NDS9172 Ordered  2  Aspirin 81 MG Oral Tablet Delayed Release; take one tablet by mouth daily; Therapy: 46MTA5999 to (Evaluate:13Jun2018) Recorded  3  Atorvastatin Calcium 40 MG Oral Tablet; take 1 tablet every day; Therapy: 65NZH2807 to (Evaluate:49Qfs0390)  Requested for: 94GTE0181; Last     Rx:01Mar2017 Ordered  4  Centrum Silver TABS; TAKE 1 TABLET DAILY; Therapy: (Recorded:13Nov2012) to Recorded  5  Hodges-3 Fish Oil CAPS; TAKE 1 CAPSULE DAILY; Therapy: (Recorded:13Nov2012) to Recorded  6  Travatan Z 0 004 % Ophthalmic Solution; Therapy: 97OTM9572 to Recorded  7  Valsartan 160 MG Oral Tablet; take 1 tablet every day; Therapy: 85ZHS7363 to (Aleta Gonzalez)  Requested for: 57IJS8835; Last     Rx:51Prq7303 Ordered  8  Vitamin D3 400 UNIT Oral Capsule; TAKE 1 CAPSULE DAILY; Therapy: 18BPY6309 to Recorded  9  Zetia 10 MG Oral Tablet; TAKE 1 TABLET DAILY; Therapy: (Recorded:10Nov2017) to Recorded  Medication List Reviewed: The medication list was reviewed and updated today  Allergies   1  Sulfa Drugs  2  No Known Environmental Allergies  3   No Known Food Allergies    Vitals   Vital Signs    Recorded: 15YSV4875 11:03AM   Temperature 98 6 F, Oral   Heart Rate 68   Systolic 249, LUE, Sitting   Diastolic 80, LUE, Sitting   Height 5 ft 7 in   Weight 184 lb    BMI Calculated 28 82   BSA Calculated 1 95   O2 Saturation 98     Physical Exam        Constitutional      General appearance: No acute distress, well appearing and well nourished  Eyes      Conjunctiva and lids: No swelling, erythema, or discharge  Pupils and irises: Equal, round and reactive to light  Ears, Nose, Mouth, and Throat      External inspection of ears and nose: Normal        Oropharynx: Normal with no erythema, edema, exudate or lesions  Pulmonary      Respiratory effort: No increased work of breathing or signs of respiratory distress  Auscultation of lungs: Clear to auscultation, equal breath sounds bilaterally, no wheezes, no rales, no rhonci  Cardiovascular      Palpation of heart: Normal PMI, no thrills  Auscultation of heart: Normal rate and rhythm, normal S1 and S2, without murmurs  Examination of extremities for edema and/or varicosities: Normal        Abdomen      Abdomen: Non-tender, no masses  Liver and spleen: No hepatomegaly or splenomegaly  Musculoskeletal      Gait and station: Normal        Skin      Skin and subcutaneous tissue: Normal without rashes or lesions  Neurologic      Cranial nerves: Cranial nerves 2-12 intact  Psychiatric      Orientation to person, place and time: Normal           Future Appointments      Date/Time Provider Specialty Site   [facility]     Signatures    Electronically signed by : Ballard Bence, MD; Jan 22 2018 11:58AM EST                       (Author)     Electronically signed by : Ballard Bence, MD; Jan 22 2018 11:59AM EST                       (Author)

## 2018-01-26 ENCOUNTER — HOSPITAL ENCOUNTER (OUTPATIENT)
Dept: CT IMAGING | Facility: HOSPITAL | Age: 68
Discharge: HOME | End: 2018-01-26
Attending: INTERNAL MEDICINE
Payer: MEDICARE

## 2018-01-26 DIAGNOSIS — K52.9 NONINFECTIVE GASTROENTERITIS AND COLITIS: ICD-10-CM

## 2018-01-26 PROCEDURE — 74177 CT ABD & PELVIS W/CONTRAST: CPT

## 2018-01-26 RX ADMIN — IOHEXOL 100 ML: 350 INJECTION, SOLUTION INTRAVENOUS at 21:02

## 2018-01-29 ENCOUNTER — APPOINTMENT (OUTPATIENT)
Dept: LAB | Facility: MEDICAL CENTER | Age: 68
End: 2018-01-29
Attending: INTERNAL MEDICINE
Payer: MEDICARE

## 2018-01-29 ENCOUNTER — TRANSCRIBE ORDERS (OUTPATIENT)
Dept: ADMINISTRATIVE | Facility: HOSPITAL | Age: 68
End: 2018-01-29

## 2018-01-29 DIAGNOSIS — K52.9 INFLAMMATORY BOWEL DISEASE: Primary | ICD-10-CM

## 2018-01-29 DIAGNOSIS — K52.9 NONINFECTIVE GASTROENTERITIS AND COLITIS: ICD-10-CM

## 2018-01-29 LAB
ANION GAP SERPL CALCULATED.3IONS-SCNC: 8 MMOL/L (ref 4–13)
BUN SERPL-MCNC: 3 MG/DL (ref 5–25)
CALCIUM SERPL-MCNC: 9.8 MG/DL (ref 8.3–10.1)
CHLORIDE SERPL-SCNC: 104 MMOL/L (ref 100–108)
CO2 SERPL-SCNC: 26 MMOL/L (ref 21–32)
CREAT SERPL-MCNC: 0.88 MG/DL (ref 0.6–1.3)
GFR SERPL CREATININE-BSD FRML MDRD: 90 ML/MIN/1.73SQ M
GLUCOSE SERPL-MCNC: 106 MG/DL (ref 65–140)
LACTATE SERPL-SCNC: 1.5 MMOL/L (ref 0.5–2)
POTASSIUM SERPL-SCNC: 4.7 MMOL/L (ref 3.5–5.3)
SODIUM SERPL-SCNC: 138 MMOL/L (ref 136–145)

## 2018-01-29 PROCEDURE — 83605 ASSAY OF LACTIC ACID: CPT

## 2018-01-29 PROCEDURE — 80048 BASIC METABOLIC PNL TOTAL CA: CPT

## 2018-01-29 PROCEDURE — 36415 COLL VENOUS BLD VENIPUNCTURE: CPT

## 2018-01-29 PROCEDURE — 83993 ASSAY FOR CALPROTECTIN FECAL: CPT

## 2018-01-31 ENCOUNTER — TELEPHONE (OUTPATIENT)
Dept: GASTROENTEROLOGY | Facility: MEDICAL CENTER | Age: 68
End: 2018-01-31

## 2018-01-31 NOTE — TELEPHONE ENCOUNTER
----- Message from Ken Calderon MD sent at 1/30/2018  2:42 PM EST -----  Call patient to report normal results

## 2018-02-01 LAB — CALPROTECTIN STL-MCNT: <16 UG/G (ref 0–120)

## 2018-02-07 ENCOUNTER — TELEPHONE (OUTPATIENT)
Dept: FAMILY MEDICINE CLINIC | Facility: CLINIC | Age: 68
End: 2018-02-07

## 2018-02-07 DIAGNOSIS — I10 ESSENTIAL HYPERTENSION: Primary | ICD-10-CM

## 2018-02-07 RX ORDER — VALSARTAN 160 MG/1
160 TABLET ORAL DAILY
Qty: 90 TABLET | Refills: 3 | Status: SHIPPED | OUTPATIENT
Start: 2018-02-07 | End: 2018-09-17 | Stop reason: SDUPTHER

## 2018-02-07 NOTE — TELEPHONE ENCOUNTER
Will Feliciano called and stated that [facility] plan has changed    He needs a new 90 day script sent to [facility] (in the chart) for Valsarten 160 mgs

## 2018-02-16 ENCOUNTER — TELEPHONE (OUTPATIENT)
Dept: FAMILY MEDICINE CLINIC | Facility: CLINIC | Age: 68
End: 2018-02-16

## 2018-02-16 DIAGNOSIS — E78.5 HYPERLIPIDEMIA, UNSPECIFIED HYPERLIPIDEMIA TYPE: Primary | ICD-10-CM

## 2018-02-16 RX ORDER — ATORVASTATIN CALCIUM 40 MG/1
40 TABLET, FILM COATED ORAL DAILY
Qty: 90 TABLET | Refills: 3 | Status: SHIPPED | OUTPATIENT
Start: 2018-02-16 | End: 2018-02-20 | Stop reason: SDUPTHER

## 2018-02-20 ENCOUNTER — TELEPHONE (OUTPATIENT)
Dept: GASTROENTEROLOGY | Facility: CLINIC | Age: 68
End: 2018-02-20

## 2018-02-20 DIAGNOSIS — E78.5 HYPERLIPIDEMIA, UNSPECIFIED HYPERLIPIDEMIA TYPE: ICD-10-CM

## 2018-02-20 RX ORDER — ATORVASTATIN CALCIUM 40 MG/1
TABLET, FILM COATED ORAL
Qty: 90 TABLET | Refills: 3 | Status: SHIPPED | OUTPATIENT
Start: 2018-02-20 | End: 2018-09-17 | Stop reason: SDUPTHER

## 2018-02-20 NOTE — TELEPHONE ENCOUNTER
[facility] pt scheduled on 2/26 for colon, he has questions regarding the procedure and prep   Please call him back @ [phone]

## 2018-02-25 ENCOUNTER — ANESTHESIA EVENT (OUTPATIENT)
Dept: GASTROENTEROLOGY | Facility: MEDICAL CENTER | Age: 68
End: 2018-02-25
Payer: MEDICARE

## 2018-02-26 ENCOUNTER — ANESTHESIA (OUTPATIENT)
Dept: GASTROENTEROLOGY | Facility: MEDICAL CENTER | Age: 68
End: 2018-02-26
Payer: MEDICARE

## 2018-02-26 ENCOUNTER — HOSPITAL ENCOUNTER (OUTPATIENT)
Facility: MEDICAL CENTER | Age: 68
Setting detail: OUTPATIENT SURGERY
Discharge: HOME | End: 2018-02-26
Attending: INTERNAL MEDICINE | Admitting: INTERNAL MEDICINE
Payer: MEDICARE

## 2018-02-26 VITALS
OXYGEN SATURATION: 97 % | HEIGHT: 67 IN | WEIGHT: 180 LBS | BODY MASS INDEX: 28.25 KG/M2 | RESPIRATION RATE: 16 BRPM | HEART RATE: 66 BPM | TEMPERATURE: 97.8 F | DIASTOLIC BLOOD PRESSURE: 63 MMHG | SYSTOLIC BLOOD PRESSURE: 125 MMHG

## 2018-02-26 DIAGNOSIS — K52.9 NONINFECTIVE GASTROENTERITIS AND COLITIS: ICD-10-CM

## 2018-02-26 PROCEDURE — 88305 TISSUE EXAM BY PATHOLOGIST: CPT | Performed by: INTERNAL MEDICINE

## 2018-02-26 PROCEDURE — 45380 COLONOSCOPY AND BIOPSY: CPT | Performed by: INTERNAL MEDICINE

## 2018-02-26 PROCEDURE — 88305 TISSUE EXAM BY PATHOLOGIST: CPT | Performed by: PATHOLOGY

## 2018-02-26 RX ORDER — SODIUM CHLORIDE 9 MG/ML
125 INJECTION, SOLUTION INTRAVENOUS CONTINUOUS
Status: DISCONTINUED | OUTPATIENT
Start: 2018-02-26 | End: 2018-02-26 | Stop reason: HOSPADM

## 2018-02-26 RX ORDER — PROPOFOL 10 MG/ML
INJECTION, EMULSION INTRAVENOUS AS NEEDED
Status: DISCONTINUED | OUTPATIENT
Start: 2018-02-26 | End: 2018-02-26 | Stop reason: SURG

## 2018-02-26 RX ADMIN — PROPOFOL 50 MG: 10 INJECTION, EMULSION INTRAVENOUS at 14:08

## 2018-02-26 RX ADMIN — PROPOFOL 80 MG: 10 INJECTION, EMULSION INTRAVENOUS at 14:13

## 2018-02-26 RX ADMIN — PROPOFOL 50 MG: 10 INJECTION, EMULSION INTRAVENOUS at 14:21

## 2018-02-26 RX ADMIN — LIDOCAINE HYDROCHLORIDE 100 MG: 20 INJECTION, SOLUTION INTRAVENOUS at 14:00

## 2018-02-26 RX ADMIN — PROPOFOL 120 MG: 10 INJECTION, EMULSION INTRAVENOUS at 14:03

## 2018-02-26 RX ADMIN — SODIUM CHLORIDE: 0.9 INJECTION, SOLUTION INTRAVENOUS at 14:03

## 2018-02-26 NOTE — OP NOTE
**** GI/ENDOSCOPY REPORT ****     PATIENT NAME: Bradley Delgado ------ VISIT ID:  Patient ID: [id number]   YOB: 1950     INTRODUCTION: Colonoscopy - A 78 male patient presents for an outpatient   Colonoscopy at [address] [place] [address]  PREVIOUS COLONOSCOPY:     INDICATIONS: Focal colitis on colonoscopy on Nov 2017  CONSENT:  The benefits, risks, and alternatives to the procedure were   discussed and informed consent was obtained from the patient  PREPARATION: EKG, pulse, pulse oximetry and blood pressure were monitored   throughout the procedure  The patient was identified by myself both   verbally and by visual inspection of ID band  Airway Assessment   Classification: Airway class 2 - Visualization of the soft palate, fauces   and uvula  ASA Classification: See anesthesia record  MEDICATIONS: Anesthesia-check records     PROCEDURE:  The endoscope was passed without difficulty through the anus   under direct visualization and advanced to the cecum, confirmed by   appendiceal orifice and ileocecal valve  The scope was withdrawn and the   mucosa was carefully examined  The quality of the preparation was good  Cecal Intubation Time: 8 minutes(s) Scope Withdrawal Time: 8 minutes(s)     RECTAL EXAM: Normal rectal exam      FINDINGS:  Two polyps, measuring 2-3 mm in size, were found in the   ascending colon  Small internal hemorrhoids were found  Otherwise, the   colon appeared to be normal      COMPLICATIONS: There were no complications  IMPRESSIONS: Two polyps found in the ascending colon  Internal hemorrhoids  RECOMMENDATIONS: Colonoscopy recommended in 5 years or 10 years based on   findings       ESTIMATED BLOOD LOSS:     PATHOLOGY SPECIMENS:     PROCEDURE CODES: Colonoscopy with biopsy     ICD-9 Codes: 211 3 Benign neoplasm of colon     ICD-10 Codes: K63 5 Polyp of colon K64 9 Unspecified hemorrhoids     PERFORMED BY: YANIQUE Martinez  on 02/26/2018  Version 1, electronically signed by YANIQUE Jade  on 02/26/2018 at   14:35

## 2018-02-26 NOTE — ANESTHESIA PREPROCEDURE EVALUATION
Review of Systems/Medical History  Patient summary reviewed  Chart reviewed  No history of anesthetic complications     Cardiovascular  Hyperlipidemia, Hypertension controlled, Valvular heart disease , aortic valve stenosis and aortic insufficiency,    Pulmonary  Negative pulmonary ROS        GI/Hepatic    Bowel prep       Negative  ROS        Endo/Other     GYN  Negative gynecology ROS          Hematology  Negative hematology ROS      Musculoskeletal  Negative musculoskeletal ROS        Neurology  Negative neurology ROS      Psychology   Negative psychology ROS              Physical Exam    Airway    Mallampati score: II  TM Distance: >3 FB  Neck ROM: full     Dental   No notable dental hx     Cardiovascular  Rhythm: regular, Rate: normal, Murmur, Cardiovascular exam normal    Pulmonary  Pulmonary exam normal Breath sounds clear to auscultation,     Other Findings        Anesthesia Plan  ASA Score- 2     Anesthesia Type- IV sedation with anesthesia with ASA Monitors  Additional Monitors:   Airway Plan:         Plan Factors-Patient not instructed to abstain from smoking on day of procedure  Patient did not smoke on day of surgery  Induction- intravenous  Postoperative Plan-     Informed Consent- Anesthetic plan and risks discussed with patient

## 2018-02-26 NOTE — DISCHARGE INSTRUCTIONS
Colonoscopy   WHAT YOU NEED TO KNOW:   A colonoscopy is a procedure to examine the inside of your colon (intestine) with a scope  Polyps or tissue growths may have been removed during your colonoscopy  It is normal to feel bloated and to have some abdominal discomfort  You should be passing gas  If you have hemorrhoids or you had polyps removed, you may have a small amount of bleeding  DISCHARGE INSTRUCTIONS:   Seek care immediately if:   · You have a large amount of bright red blood in your bowel movements  · Your abdomen is hard and firm and you have severe pain  · You have sudden trouble breathing  Contact your healthcare provider if:   · You develop a rash or hives  · You have a fever within 24 hours of your procedure  · You have not had a bowel movement for 3 days after your procedure  · You have questions or concerns about your condition or care  Activity:   · Do not lift, strain, or run  for 3 days after your procedure  · Rest after your procedure  You have been given medicine to relax you  Do not  drive or make important decisions until the day after your procedure  Return to your normal activity as directed  · Relieve gas and discomfort from bloating  by lying on your right side with a heating pad on your abdomen  You may need to take short walks to help the gas move out  Eat small meals until bloating is relieved  If you had polyps removed: For 7 days after your procedure:  · Do not  take aspirin  · Do not  go on long car rides  Help prevent constipation:   · Eat a variety of healthy foods  Healthy foods include fruit, vegetables, whole-grain breads, low-fat dairy products, beans, lean meat, and fish  Ask if you need to be on a special diet  Your healthcare provider may recommend that you eat high-fiber foods such as cooked beans  Fiber helps you have regular bowel movements  · Drink liquids as directed    Adults should drink between 9 and 13 eight-ounce cups of liquid every day  Ask what amount is best for you  For most people, good liquids to drink are water, juice, and milk  · Exercise as directed  Talk to your healthcare provider about the best exercise plan for you  Exercise can help prevent constipation, decrease your blood pressure and improve your health  Follow up with your healthcare provider as directed:  Write down your questions so you remember to ask them during your visits  © 2017 [address] [employer] Information is for End User's use only and may not be sold, redistributed or otherwise used for commercial purposes  All illustrations and images included in CareNotes® are the copyrighted property of [employer]  or Casey Diaz  The above information is an  only  It is not intended as medical advice for individual conditions or treatments  Talk to your doctor, nurse or pharmacist before following any medical regimen to see if it is safe and effective for you  Colorectal Polyps   WHAT YOU NEED TO KNOW:   Colorectal polyps are small growths of tissue in the lining of the colon and rectum  Most polyps are hyperplastic polyps and are usually benign (noncancerous)  Certain types of polyps, called adenomatous polyps, may turn into cancer  DISCHARGE INSTRUCTIONS:   Follow up with your healthcare provider or gastroenterologist as directed: You may need to return for more tests, such as another colonoscopy  Write down your questions so you remember to ask them during your visits  Reduce your risk for colorectal polyps:   · Eat a variety of healthy foods:  Healthy foods include fruit, vegetables, whole-grain breads, low-fat dairy products, beans, lean meat, and fish  Ask if you need to be on a special diet  · Maintain a healthy weight:  Ask your healthcare provider if you need to lose weight and how much you need to lose   Ask for help with a weight loss program     · Exercise:  Begin to exercise slowly and do more as you get stronger  Talk with your healthcare provider before you start an exercise program      · Limit alcohol:  Your risk for polyps increases the more you drink  · Do not smoke: If you smoke, it is never too late to quit  Ask for information about how to stop  For support and more information:   · Mona Rabago ([facility])  [address] , [place] [address]  Phone: 2- [phone]  Web Address: www digestive  niddk nih gov  Contact your healthcare provider or gastroenterologist if:   · You have a fever  · You have chills, a cough, or feel weak and achy  · You have abdominal pain that does not go away or gets worse after you take medicine  · Your abdomen is swollen  · You are losing weight without trying  · You have questions or concerns about your condition or care  Seek care immediately or call 911 if:   · You have sudden shortness of breath  · You have a fast heart rate, fast breathing, or are too dizzy to stand up  · You have severe abdominal pain  · You see blood in your bowel movement  © 2017 [address] [facility] Information is for End User's use only and may not be sold, redistributed or otherwise used for commercial purposes  All illustrations and images included in CareNotes® are the copyrighted property of [employer]  or Casey Diaz  The above information is an  only  It is not intended as medical advice for individual conditions or treatments  Talk to your doctor, nurse or pharmacist before following any medical regimen to see if it is safe and effective for you  Hemorrhoids   WHAT YOU NEED TO KNOW:   What are hemorrhoids? Hemorrhoids are swollen blood vessels inside your rectum (internal hemorrhoids) or on your anus (external hemorrhoids)  Sometimes a hemorrhoid may prolapse  This means it extends out of your anus  What increases my risk for hemorrhoids? · Pregnancy or obesity    · Straining or sitting for a long time during bowel movements    · Liver disease    · Weak muscles around the anus caused by older age, rectal surgery, or anal intercourse    · A lack of physical activity    · Chronic diarrhea or constipation    · A low-fiber diet  What are the signs and symptoms of hemorrhoids? · Pain or itching around your anus or inside your rectum    · Swelling or bumps around your anus    · Bright red blood in your bowel movement, on the toilet paper, or in the toilet bowl    · Tissue bulging out of your anus (prolapsed hemorrhoids)    · Incontinence (poor control over urine or bowel movements)  How are hemorrhoids diagnosed? Your healthcare provider will ask about your symptoms, the foods you eat, and your bowel movements  He will examine your anus for external hemorrhoids  You may need the following:  · A digital rectal exam  is a test to check for hemorrhoids  Your healthcare provider will put a gloved finger inside your anus to feel for the hemorrhoids  · An anoscopy  is a test that uses a scope (small tube with a light and camera on the end) to look at your hemorrhoids  How are hemorrhoids treated? Treatment will depend on your symptoms  You may need any of the following:  · Medicines  can help decrease pain and swelling, and soften your bowel movement  The medicine may be a pill, pad, cream, or ointment  · Procedures  may be used to shrink or remove your hemorrhoid  Examples include rubber-band ligation, sclerotherapy, and photocoagulation  These procedures may be done in your healthcare provider's office  Ask your healthcare provider for more information about these procedures  · Surgery  may be needed to shrink or remove your hemorrhoids  How can I manage my symptoms? · Apply ice on your anus for 15 to 20 minutes every hour or as directed  Use an ice pack, or put crushed ice in a plastic bag   Cover it with a towel before you apply it to your anus  Ice helps prevent tissue damage and decreases swelling and pain  · Take a sitz bath  Fill a bathtub with 4 to 6 inches of warm water  You may also use a sitz bath pan that fits inside a toilet bowl  Sit in the sitz bath for 15 minutes  Do this 3 times a day, and after each bowel movement  The warm water can help decrease pain and swelling  · Keep your anal area clean  Gently wash the area with warm water daily  Soap may irritate the area  After a bowel movement, wipe with moist towelettes or wet toilet paper  Dry toilet paper can irritate the area  How can I help prevent hemorrhoids? · Do not strain to have a bowel movement  Do not sit on the toilet too long  These actions can increase pressure on the tissues in your rectum and anus  · Drink plenty of liquids  Liquids can help prevent constipation  Ask how much liquid to drink each day and which liquids are best for you  · Eat a variety of high-fiber foods  Examples include fruits, vegetables, and whole grains  Ask your healthcare provider how much fiber you need each day  You may need to take a fiber supplement  · Exercise as directed  Exercise, such as walking, may make it easier to have a bowel movement  Ask your healthcare provider to help you create an exercise plan  · Do not have anal sex  Anal sex can weaken the skin around your rectum and anus  · Avoid heavy lifting  This can cause straining and increase your risk for another hemorrhoid  When should I seek immediate care? · You have severe pain in your rectum or around your anus  · You have severe pain in your abdomen and you are vomiting  · You have bleeding from your anus that soaks through your underwear  When should I contact my healthcare provider? · You have frequent and painful bowel movements  · Your hemorrhoid looks or feels more swollen than usual      · You do not have a bowel movement for 2 days or more       · You see or feel tissue coming through your anus  · You have questions or concerns about your condition or care  CARE AGREEMENT:   You have the right to help plan your care  Learn about your health condition and how it may be treated  Discuss treatment options with your caregivers to decide what care you want to receive  You always have the right to refuse treatment  The above information is an  only  It is not intended as medical advice for individual conditions or treatments  Talk to your doctor, nurse or pharmacist before following any medical regimen to see if it is safe and effective for you  © 2017 [address] [employer]  Information is for End User's use only and may not be sold, redistributed or otherwise used for commercial purposes  All illustrations and images included in CareNotes® are the copyrighted property of A [employer] M , Inc  or [address]

## 2018-02-26 NOTE — ANESTHESIA POSTPROCEDURE EVALUATION
Post-Op Assessment Note      CV Status:  Stable    Mental Status:  Alert and awake    Hydration Status:  Euvolemic    PONV Controlled:  Controlled    Airway Patency:  Patent    Post Op Vitals Reviewed: Yes          Staff: Anesthesiologist           /63 (02/26/18 1445)    Temp      Pulse 66 (02/26/18 1445)   Resp 16 (02/26/18 1445)    SpO2 97 % (02/26/18 1445)

## 2018-03-01 DIAGNOSIS — I10 ESSENTIAL (PRIMARY) HYPERTENSION: ICD-10-CM

## 2018-03-01 DIAGNOSIS — E78.5 HYPERLIPIDEMIA: ICD-10-CM

## 2018-03-01 DIAGNOSIS — Z11.59 ENCOUNTER FOR SCREENING FOR OTHER VIRAL DISEASES: ICD-10-CM

## 2018-03-01 DIAGNOSIS — R94.6 ABNORMAL RESULTS OF THYROID FUNCTION STUDIES: ICD-10-CM

## 2018-03-05 ENCOUNTER — TELEPHONE (OUTPATIENT)
Dept: GASTROENTEROLOGY | Facility: MEDICAL CENTER | Age: 68
End: 2018-03-05

## 2018-03-05 NOTE — TELEPHONE ENCOUNTER
----- Message from Joselin Lima MD sent at 3/5/2018 11:59 AM EST -----  Multiple colonic polyps removed were serrated adenomas repeat in 3 years

## 2018-03-12 ENCOUNTER — APPOINTMENT (OUTPATIENT)
Dept: LAB | Facility: CLINIC | Age: 68
End: 2018-03-12
Payer: MEDICARE

## 2018-03-12 DIAGNOSIS — Z11.59 ENCOUNTER FOR SCREENING FOR OTHER VIRAL DISEASES: ICD-10-CM

## 2018-03-12 DIAGNOSIS — R94.6 ABNORMAL RESULTS OF THYROID FUNCTION STUDIES: ICD-10-CM

## 2018-03-12 DIAGNOSIS — E78.5 HYPERLIPIDEMIA: ICD-10-CM

## 2018-03-12 DIAGNOSIS — I10 ESSENTIAL (PRIMARY) HYPERTENSION: ICD-10-CM

## 2018-03-12 LAB
ALBUMIN SERPL BCP-MCNC: 3.9 G/DL (ref 3.5–5)
ALP SERPL-CCNC: 84 U/L (ref 46–116)
ALT SERPL W P-5'-P-CCNC: 47 U/L (ref 12–78)
ANION GAP SERPL CALCULATED.3IONS-SCNC: 6 MMOL/L (ref 4–13)
AST SERPL W P-5'-P-CCNC: 64 U/L (ref 5–45)
BILIRUB SERPL-MCNC: 0.99 MG/DL (ref 0.2–1)
BUN SERPL-MCNC: 16 MG/DL (ref 5–25)
CALCIUM SERPL-MCNC: 9 MG/DL (ref 8.3–10.1)
CHLORIDE SERPL-SCNC: 104 MMOL/L (ref 100–108)
CHOLEST SERPL-MCNC: 201 MG/DL (ref 50–200)
CO2 SERPL-SCNC: 27 MMOL/L (ref 21–32)
CREAT SERPL-MCNC: 0.88 MG/DL (ref 0.6–1.3)
GFR SERPL CREATININE-BSD FRML MDRD: 90 ML/MIN/1.73SQ M
GLUCOSE P FAST SERPL-MCNC: 98 MG/DL (ref 65–99)
HDLC SERPL-MCNC: 41 MG/DL (ref 40–60)
LDLC SERPL CALC-MCNC: 127 MG/DL (ref 0–100)
POTASSIUM SERPL-SCNC: 5.1 MMOL/L (ref 3.5–5.3)
PROT SERPL-MCNC: 8.1 G/DL (ref 6.4–8.2)
SODIUM SERPL-SCNC: 137 MMOL/L (ref 136–145)
T4 FREE SERPL-MCNC: 0.79 NG/DL (ref 0.76–1.46)
TRIGL SERPL-MCNC: 164 MG/DL
TSH SERPL DL<=0.05 MIU/L-ACNC: 3.89 UIU/ML (ref 0.36–3.74)

## 2018-03-12 PROCEDURE — 86803 HEPATITIS C AB TEST: CPT

## 2018-03-12 PROCEDURE — 36415 COLL VENOUS BLD VENIPUNCTURE: CPT

## 2018-03-12 PROCEDURE — 80053 COMPREHEN METABOLIC PANEL: CPT

## 2018-03-12 PROCEDURE — 80061 LIPID PANEL: CPT

## 2018-03-12 PROCEDURE — 84439 ASSAY OF FREE THYROXINE: CPT

## 2018-03-12 PROCEDURE — 84443 ASSAY THYROID STIM HORMONE: CPT

## 2018-03-13 LAB — HCV AB SER QL: NORMAL

## 2018-03-22 RX ORDER — TRAVOPROST OPHTHALMIC SOLUTION 0.04 MG/ML
SOLUTION OPHTHALMIC
COMMUNITY
Start: 2017-11-15 | End: 2018-09-17 | Stop reason: ALTCHOICE

## 2018-03-22 RX ORDER — EZETIMIBE 10 MG/1
1 TABLET ORAL DAILY
COMMUNITY
End: 2018-09-17 | Stop reason: ALTCHOICE

## 2018-03-23 ENCOUNTER — OFFICE VISIT (OUTPATIENT)
Dept: FAMILY MEDICINE CLINIC | Facility: CLINIC | Age: 68
End: 2018-03-23
Payer: MEDICARE

## 2018-03-23 VITALS
HEIGHT: 68 IN | WEIGHT: 184.8 LBS | DIASTOLIC BLOOD PRESSURE: 84 MMHG | HEART RATE: 72 BPM | SYSTOLIC BLOOD PRESSURE: 122 MMHG | RESPIRATION RATE: 16 BRPM | BODY MASS INDEX: 28.01 KG/M2

## 2018-03-23 DIAGNOSIS — I10 ESSENTIAL HYPERTENSION: ICD-10-CM

## 2018-03-23 DIAGNOSIS — Z23 NEED FOR PNEUMOCOCCAL VACCINATION: ICD-10-CM

## 2018-03-23 DIAGNOSIS — Z12.5 SCREENING FOR PROSTATE CANCER: ICD-10-CM

## 2018-03-23 DIAGNOSIS — R74.01 ELEVATED AST (SGOT): ICD-10-CM

## 2018-03-23 DIAGNOSIS — E78.5 HYPERLIPIDEMIA, UNSPECIFIED HYPERLIPIDEMIA TYPE: Primary | ICD-10-CM

## 2018-03-23 DIAGNOSIS — R79.89 ELEVATED TSH: ICD-10-CM

## 2018-03-23 DIAGNOSIS — I35.1 NONRHEUMATIC AORTIC VALVE INSUFFICIENCY: ICD-10-CM

## 2018-03-23 PROBLEM — D12.6 SERRATED ADENOMA OF COLON: Status: ACTIVE | Noted: 2017-11-10

## 2018-03-23 PROCEDURE — 99214 OFFICE O/P EST MOD 30 MIN: CPT | Performed by: FAMILY MEDICINE

## 2018-03-23 PROCEDURE — G0009 ADMIN PNEUMOCOCCAL VACCINE: HCPCS

## 2018-03-23 PROCEDURE — 90732 PPSV23 VACC 2 YRS+ SUBQ/IM: CPT

## 2018-03-23 NOTE — PROGRESS NOTES
Assessment/Plan:  1  Hyperlipidemia, unspecified hyperlipidemia type  - continue Atorvastatin 40 mg daily  - continue Zetia 10 mg daily  - discussed healthy diet and regular exercise  - Lipid Panel with Direct LDL reflex; Future  - Comprehensive metabolic panel; Future    2  Essential hypertension- well controlled,   - continue Valsartan 160 mg daily  - Comprehensive metabolic panel; Future  - CBC and differential; Future  - TSH, 3rd generation with T4 reflex; Future    3  Elevated TSH  - s/s of hypothyroidism discussed  - repeat TSH, 3rd generation with T4 reflex in 6 months    4  Elevated AST (SGOT)  - recheck CMP    5  Nonrheumatic aortic valve insufficiency  - f/u with Cardiology    6  Screening for prostate cancer  - PSA; Future    7  Need for pneumococcal vaccination  - PNEUMOCOCCAL POLYSACCHARIDE VACCINE 23-VALENT =>3YO SQ IM    RTO in 6 months with labs done prior  The treatment plan was reviewed with the patient/guardian  The patient/guardian understands and agrees with the treatment plan      Subjective:   Chief Complaint   Patient presents with    Follow-up      Patient ID: Shilo Tovar is a 78 y o  male presents today for f/u his chronic medical conditions and review his lab results, states he feel swell and offers no complaints, he has been compliant with his medications, he walks for 2 miles 4-5 days a week, but his diet needs improvement  Hypertension   This is a chronic problem  The problem is controlled  Pertinent negatives include no anxiety, blurred vision, chest pain, headaches, orthopnea, palpitations, peripheral edema, PND or shortness of breath  Past treatments include angiotensin blockers  There are no compliance problems          Past Medical History:   Diagnosis Date    Adenomatous colon polyp     Aortic regurgitation     Aortic stenosis     Diverticulosis     Heart murmur     Hemorrhoids     Last Assessed: 11/10/17    Hyperlipidemia     Hypertension     Vitamin D deficiency      Past Surgical History:   Procedure Laterality Date    COLONOSCOPY      COLONOSCOPY N/A 11/28/2017    Procedure: COLONOSCOPY with polypectomy, random bx, and hemorrhoid banding;  Surgeon: Esther Godfrey MD;  Location: [facility]; Service: General    COLONOSCOPY N/A 2/26/2018    Procedure: COLONOSCOPY;  Surgeon: Yara Marcos MD;  Location: [facility]; Service: Gastroenterology    MOUTH SURGERY      MT COLONOSCOPY FLX DX W/COLLJ [facility] WHEN PFRMD N/A 11/29/2016    Procedure: COLONOSCOPY POSSIBLE BANDING;  Surgeon: Esther Godfrey MD;  Location: [facility]; Service: General     Family History   Problem Relation Age of Onset    Asthma Mother     Heart failure Mother      Congestive    Depression Mother     Alcohol abuse Father     Stroke Father      CVA    Thyroid disease Sister     Heart attack Brother      Myocardial Infarction    Depression Brother     Stroke Family      Syndrome    Substance Abuse Neg Hx      Social History     Social History    Marital status: /Civil Union     Spouse name: N/A    Number of children: N/A    Years of education: N/A     Occupational History    Not on file  Social History Main Topics    Smoking status: Never Smoker    Smokeless tobacco: Never Used    Alcohol use Yes      Comment: 2 beers daily   Per [employer]: minimal usage    Drug use: No    Sexual activity: Not on file     Other Topics Concern    Not on file     Social History Narrative    Caffeine use: 3 cups coffe per day    Has smoke detectors    Uses safety equipment: seatbelts       Current Outpatient Prescriptions:     aspirin 81 MG tablet, Take 81 mg by mouth daily, Disp: , Rfl:     atorvastatin (LIPITOR) 40 mg tablet, TAKE 1 TABLET EVERY DAY, Disp: 90 tablet, Rfl: 3    Cholecalciferol (VITAMIN D-400 PO), Take by mouth, Disp: , Rfl:     Multiple Vitamin (MULTI VITAMIN DAILY PO), Take by mouth, Disp: , Rfl:     Multiple Vitamins-Minerals (CENTRUM SILVER PO), Take by mouth daily, Disp: , Rfl:     Omega-3 Fatty Acids (OMEGA 3 PO), Take by mouth daily  , Disp: , Rfl:     valsartan (DIOVAN) 160 mg tablet, Take 1 tablet (160 mg total) by mouth daily, Disp: 90 tablet, Rfl: 3    docusate sodium (COLACE) 100 mg capsule, Take 1 capsule by mouth 2 (two) times a day for 30 days, Disp: 60 capsule, Rfl: 0    ezetimibe (ZETIA) 10 mg tablet, Take 1 tablet by mouth daily, Disp: , Rfl:     pramoxine-hydrocortisone (ANALPRAM-HC) 1-1 % rectal cream, Insert into the rectum, Disp: , Rfl:     travoprost (TRAVATAN Z) 0 004 % ophthalmic solution, Apply to eye, Disp: , Rfl:     Review of Systems   Constitutional: Negative for appetite change, chills, fatigue, fever and unexpected weight change  Eyes: Negative for blurred vision  Respiratory: Negative for cough, shortness of breath and wheezing  Cardiovascular: Negative for chest pain, palpitations, orthopnea, leg swelling and PND  Gastrointestinal: Negative for abdominal pain, blood in stool, constipation, diarrhea, nausea and vomiting  Genitourinary: Negative for difficulty urinating, dysuria, frequency, hematuria and urgency  Musculoskeletal: Negative for arthralgias, joint swelling and myalgias  Skin: Negative for rash  Neurological: Negative for dizziness, syncope, weakness, numbness and headaches  Hematological: Negative for adenopathy  Psychiatric/Behavioral: Negative for dysphoric mood and sleep disturbance  The patient is not nervous/anxious  Objective:    Vitals:    03/23/18 1329   BP: 122/84   BP Location: Left arm   Patient Position: Sitting   Cuff Size: Adult   Pulse: 72   Resp: 16   Weight: 83 8 kg (184 lb 12 8 oz)   Height: 5' 7 75" (1 721 m)        Physical Exam   Constitutional: He is oriented to person, place, and time  He appears well-developed and well-nourished  No distress  Neck: Neck supple  No thyromegaly present  Cardiovascular: Normal rate, regular rhythm and normal heart sounds      No murmur heard  Pulmonary/Chest: Effort normal  No respiratory distress  He has no wheezes  He has no rhonchi  He has no rales  Abdominal: Soft  He exhibits no distension and no mass  There is no tenderness  Musculoskeletal: He exhibits no edema  Lymphadenopathy:     He has no cervical adenopathy  Neurological: He is alert and oriented to person, place, and time  Psychiatric: He has a normal mood and affect  His behavior is normal  Thought content normal        No visits with results within 10 Day(s) from this visit     Latest known visit with results is:   Appointment on 03/12/2018   Component Date Value Ref Range Status    Cholesterol 03/12/2018 201* 50 - 200 mg/dL Final    Triglycerides 03/12/2018 164* <=150 mg/dL Final    HDL, Direct 03/12/2018 41  40 - 60 mg/dL Final    LDL Calculated 03/12/2018 127* 0 - 100 mg/dL Final    Sodium 03/12/2018 137  136 - 145 mmol/L Final    Potassium 03/12/2018 5 1  3 5 - 5 3 mmol/L Final    Chloride 03/12/2018 104  100 - 108 mmol/L Final    CO2 03/12/2018 27  21 - 32 mmol/L Final    Anion Gap 03/12/2018 6  4 - 13 mmol/L Final    BUN 03/12/2018 16  5 - 25 mg/dL Final    Creatinine 03/12/2018 0 88  0 60 - 1 30 mg/dL Final    Glucose, Fasting 03/12/2018 98  65 - 99 mg/dL Final    Calcium 03/12/2018 9 0  8 3 - 10 1 mg/dL Final    AST 03/12/2018 64* 5 - 45 U/L Final    ALT 03/12/2018 47  12 - 78 U/L Final    Alkaline Phosphatase 03/12/2018 84  46 - 116 U/L Final    Total Protein 03/12/2018 8 1  6 4 - 8 2 g/dL Final    Albumin 03/12/2018 3 9  3 5 - 5 0 g/dL Final    Total Bilirubin 03/12/2018 0 99  0 20 - 1 00 mg/dL Final    eGFR 03/12/2018 90  ml/min/1 73sq m Final    TSH 3RD GENERATON 03/12/2018 3 890* 0 358 - 3 740 uIU/mL Final    Hepatitis C Ab 03/12/2018 Non-reactive  Non-reactive Final    Free T4 03/12/2018 0 79  0 76 - 1 46 ng/dL Final

## 2018-09-10 ENCOUNTER — APPOINTMENT (OUTPATIENT)
Dept: LAB | Facility: CLINIC | Age: 68
End: 2018-09-10
Payer: MEDICARE

## 2018-09-10 DIAGNOSIS — I10 ESSENTIAL HYPERTENSION: ICD-10-CM

## 2018-09-10 DIAGNOSIS — R79.89 ELEVATED TSH: ICD-10-CM

## 2018-09-10 DIAGNOSIS — Z12.5 SCREENING FOR PROSTATE CANCER: ICD-10-CM

## 2018-09-10 DIAGNOSIS — E78.5 HYPERLIPIDEMIA, UNSPECIFIED HYPERLIPIDEMIA TYPE: ICD-10-CM

## 2018-09-10 LAB
ALBUMIN SERPL BCP-MCNC: 3.8 G/DL (ref 3.5–5)
ALP SERPL-CCNC: 88 U/L (ref 46–116)
ALT SERPL W P-5'-P-CCNC: 48 U/L (ref 12–78)
ANION GAP SERPL CALCULATED.3IONS-SCNC: 4 MMOL/L (ref 4–13)
AST SERPL W P-5'-P-CCNC: 27 U/L (ref 5–45)
BASOPHILS # BLD AUTO: 0.03 THOUSANDS/ΜL (ref 0–0.1)
BASOPHILS NFR BLD AUTO: 1 % (ref 0–1)
BILIRUB SERPL-MCNC: 0.76 MG/DL (ref 0.2–1)
BUN SERPL-MCNC: 14 MG/DL (ref 5–25)
CALCIUM SERPL-MCNC: 9.2 MG/DL (ref 8.3–10.1)
CHLORIDE SERPL-SCNC: 104 MMOL/L (ref 100–108)
CHOLEST SERPL-MCNC: 207 MG/DL (ref 50–200)
CO2 SERPL-SCNC: 27 MMOL/L (ref 21–32)
CREAT SERPL-MCNC: 0.87 MG/DL (ref 0.6–1.3)
EOSINOPHIL # BLD AUTO: 0.15 THOUSAND/ΜL (ref 0–0.61)
EOSINOPHIL NFR BLD AUTO: 3 % (ref 0–6)
ERYTHROCYTE [DISTWIDTH] IN BLOOD BY AUTOMATED COUNT: 12.9 % (ref 11.6–15.1)
GFR SERPL CREATININE-BSD FRML MDRD: 90 ML/MIN/1.73SQ M
GLUCOSE P FAST SERPL-MCNC: 98 MG/DL (ref 65–99)
HCT VFR BLD AUTO: 46.7 % (ref 36.5–49.3)
HDLC SERPL-MCNC: 42 MG/DL (ref 40–60)
HGB BLD-MCNC: 15.7 G/DL (ref 12–17)
IMM GRANULOCYTES # BLD AUTO: 0.01 THOUSAND/UL (ref 0–0.2)
IMM GRANULOCYTES NFR BLD AUTO: 0 % (ref 0–2)
LDLC SERPL CALC-MCNC: 134 MG/DL (ref 0–100)
LYMPHOCYTES # BLD AUTO: 1.42 THOUSANDS/ΜL (ref 0.6–4.47)
LYMPHOCYTES NFR BLD AUTO: 25 % (ref 14–44)
MCH RBC QN AUTO: 31.4 PG (ref 26.8–34.3)
MCHC RBC AUTO-ENTMCNC: 33.6 G/DL (ref 31.4–37.4)
MCV RBC AUTO: 93 FL (ref 82–98)
MONOCYTES # BLD AUTO: 0.73 THOUSAND/ΜL (ref 0.17–1.22)
MONOCYTES NFR BLD AUTO: 13 % (ref 4–12)
NEUTROPHILS # BLD AUTO: 3.37 THOUSANDS/ΜL (ref 1.85–7.62)
NEUTS SEG NFR BLD AUTO: 58 % (ref 43–75)
NRBC BLD AUTO-RTO: 0 /100 WBCS
PLATELET # BLD AUTO: 190 THOUSANDS/UL (ref 149–390)
PMV BLD AUTO: 11 FL (ref 8.9–12.7)
POTASSIUM SERPL-SCNC: 5 MMOL/L (ref 3.5–5.3)
PROT SERPL-MCNC: 8 G/DL (ref 6.4–8.2)
PSA SERPL-MCNC: 2.2 NG/ML (ref 0–4)
RBC # BLD AUTO: 5 MILLION/UL (ref 3.88–5.62)
SODIUM SERPL-SCNC: 135 MMOL/L (ref 136–145)
T4 FREE SERPL-MCNC: 0.82 NG/DL (ref 0.76–1.46)
TRIGL SERPL-MCNC: 157 MG/DL
TSH SERPL DL<=0.05 MIU/L-ACNC: 4.26 UIU/ML (ref 0.36–3.74)
WBC # BLD AUTO: 5.71 THOUSAND/UL (ref 4.31–10.16)

## 2018-09-10 PROCEDURE — 85025 COMPLETE CBC W/AUTO DIFF WBC: CPT

## 2018-09-10 PROCEDURE — 84439 ASSAY OF FREE THYROXINE: CPT

## 2018-09-10 PROCEDURE — 84443 ASSAY THYROID STIM HORMONE: CPT

## 2018-09-10 PROCEDURE — 80061 LIPID PANEL: CPT

## 2018-09-10 PROCEDURE — 36415 COLL VENOUS BLD VENIPUNCTURE: CPT

## 2018-09-10 PROCEDURE — G0103 PSA SCREENING: HCPCS

## 2018-09-10 PROCEDURE — 80053 COMPREHEN METABOLIC PANEL: CPT

## 2018-09-17 ENCOUNTER — OFFICE VISIT (OUTPATIENT)
Dept: FAMILY MEDICINE CLINIC | Facility: CLINIC | Age: 68
End: 2018-09-17
Payer: MEDICARE

## 2018-09-17 VITALS
DIASTOLIC BLOOD PRESSURE: 80 MMHG | WEIGHT: 182.8 LBS | SYSTOLIC BLOOD PRESSURE: 130 MMHG | HEIGHT: 68 IN | BODY MASS INDEX: 27.71 KG/M2 | HEART RATE: 78 BPM | RESPIRATION RATE: 16 BRPM

## 2018-09-17 DIAGNOSIS — I35.1 NONRHEUMATIC AORTIC VALVE INSUFFICIENCY: ICD-10-CM

## 2018-09-17 DIAGNOSIS — E03.8 SUBCLINICAL HYPOTHYROIDISM: ICD-10-CM

## 2018-09-17 DIAGNOSIS — Z23 NEED FOR PROPHYLACTIC VACCINATION AND INOCULATION AGAINST INFLUENZA: ICD-10-CM

## 2018-09-17 DIAGNOSIS — E78.5 HYPERLIPIDEMIA, UNSPECIFIED HYPERLIPIDEMIA TYPE: ICD-10-CM

## 2018-09-17 DIAGNOSIS — I10 ESSENTIAL HYPERTENSION: Primary | ICD-10-CM

## 2018-09-17 PROCEDURE — 90662 IIV NO PRSV INCREASED AG IM: CPT

## 2018-09-17 PROCEDURE — G0008 ADMIN INFLUENZA VIRUS VAC: HCPCS

## 2018-09-17 PROCEDURE — 99214 OFFICE O/P EST MOD 30 MIN: CPT | Performed by: FAMILY MEDICINE

## 2018-09-17 RX ORDER — ATORVASTATIN CALCIUM 40 MG/1
40 TABLET, FILM COATED ORAL DAILY
Qty: 90 TABLET | Refills: 3 | Status: SHIPPED | OUTPATIENT
Start: 2018-09-17 | End: 2019-09-27 | Stop reason: SDUPTHER

## 2018-09-17 RX ORDER — LOSARTAN POTASSIUM 100 MG/1
100 TABLET ORAL DAILY
Qty: 30 TABLET | Refills: 0 | Status: SHIPPED | OUTPATIENT
Start: 2018-09-17 | End: 2018-10-01 | Stop reason: SDUPTHER

## 2018-09-17 NOTE — PROGRESS NOTES
Assessment/Plan:    1  Essential hypertension  - will d/c Valsartan due to recall and start Losartan 100 mg daily, rosa the office in 2 weeks with home BP readings  - losartan (COZAAR) 100 MG tablet; Take 1 tablet (100 mg total) by mouth daily  Dispense: 30 tablet; Refill: 0    2  Hyperlipidemia, unspecified hyperlipidemia type  - , , continue Atorvastatin 40 mg daily and Fish oil, discussed low fat/ low cholesterol diet and regular exercise  - atorvastatin (LIPITOR) 40 mg tablet; Take 1 tablet (40 mg total) by mouth daily  Dispense: 90 tablet; Refill: 3    3  Need for prophylactic vaccination and inoculation against influenza  - influenza vaccine, 4325-2323, high-dose, PF 0 5 mL, for patients 65 yr+ (FLUZONE HIGH-DOSE)    4  Subclinical hypothyroidism  - TSH 4 2, stable, s/s of hypothyroidism discussed, will recheck TSH in 6 months    5  Nonrheumatic aortic valve regurgitation  - repeat Echo scheduled on 10/29/18, follow up with Cardiology      Follow up in the office in 3-4 months or sooner as needed  Possible side effects of new medications were reviewed with the patient today  The treatment plan was reviewed with the patient  The patient understands and agrees with the treatment plan      Subjective:   Chief Complaint   Patient presents with    Hyperlipidemia    Hypertension    Elevated TSH    Elevated AST      Patient ID: Armaan Rapp is a 78 y o  male with history of HTN, hyperlipidemia, subclinical hypothyroidism presents today for a follow up visit and review his lab results  Patient states he feels well and has no complaints, he would like to discuss changing his Valsartan since it has been recalled  He exercises regularly and has been compliant with his medications  Hypertension   The problem is controlled  Pertinent negatives include no anxiety, blurred vision, chest pain, headaches, malaise/fatigue, orthopnea, palpitations, peripheral edema, PND or shortness of breath   Past treatments include angiotensin blockers  There are no compliance problems  Past Medical History:   Diagnosis Date    Adenomatous colon polyp     Aortic regurgitation     Aortic stenosis     Diverticulosis     Heart murmur     Hemorrhoids     Last Assessed: 11/10/17    Hyperlipidemia     Hypertension     Vitamin D deficiency      Past Surgical History:   Procedure Laterality Date    COLONOSCOPY      COLONOSCOPY N/A 11/28/2017    Procedure: COLONOSCOPY with polypectomy, random bx, and hemorrhoid banding;  Surgeon: Corina Eisenmenger, MD;  Location: [facility]; Service: General    COLONOSCOPY N/A 2/26/2018    Procedure: COLONOSCOPY;  Surgeon: Marta Palmer MD;  Location: [facility]; Service: Gastroenterology    MOUTH SURGERY      NC COLONOSCOPY FLX DX W/[facility] WHEN PFRMD N/A 11/29/2016    Procedure: COLONOSCOPY POSSIBLE BANDING;  Surgeon: Corina Eisenmenger, MD;  Location: [facility]; Service: General     Family History   Problem Relation Age of Onset    Asthma Mother     Heart failure Mother         Congestive    Depression Mother     Alcohol abuse Father     Stroke Father         CVA    Thyroid disease Sister     Heart attack Brother         Myocardial Infarction    Depression Brother     Stroke Family         Syndrome     Social History     Social History    Marital status: /Civil Union     Spouse name: N/A    Number of children: N/A    Years of education: N/A     Occupational History    Not on file  Social History Main Topics    Smoking status: Never Smoker    Smokeless tobacco: Never Used    Alcohol use Yes      Comment: 2 beers daily   Per [employer]: minimal usage    Drug use: No    Sexual activity: Not on file     Other Topics Concern    Not on file     Social History Narrative    Caffeine use: 3 cups coffe per day    Has smoke detectors    Uses safety equipment: seatbelts       Current Outpatient Prescriptions:     aspirin 81 MG tablet, Take 81 mg by mouth daily, Disp: , Rfl:     atorvastatin (LIPITOR) 40 mg tablet, Take 1 tablet (40 mg total) by mouth daily, Disp: 90 tablet, Rfl: 3    Cholecalciferol (VITAMIN D-400 PO), Take by mouth, Disp: , Rfl:     losartan (COZAAR) 100 MG tablet, Take 1 tablet (100 mg total) by mouth daily, Disp: 30 tablet, Rfl: 0    Multiple Vitamins-Minerals (CENTRUM SILVER PO), Take by mouth daily, Disp: , Rfl:     Omega-3 Fatty Acids (OMEGA 3 PO), Take by mouth daily  , Disp: , Rfl:     docusate sodium (COLACE) 100 mg capsule, Take 1 capsule by mouth 2 (two) times a day for 30 days, Disp: 60 capsule, Rfl: 0    Review of Systems   Constitutional: Negative for appetite change, chills, fatigue, fever, malaise/fatigue and unexpected weight change  Eyes: Negative for blurred vision  Respiratory: Negative for cough, shortness of breath and wheezing  Cardiovascular: Negative for chest pain, palpitations, orthopnea, leg swelling and PND  Gastrointestinal: Negative for abdominal pain, blood in stool, constipation, diarrhea, nausea and vomiting  Genitourinary: Negative for difficulty urinating, dysuria, frequency, hematuria and urgency  Musculoskeletal: Negative for arthralgias, joint swelling and myalgias  Skin: Negative for rash  Neurological: Negative for dizziness, syncope, weakness, numbness and headaches  Hematological: Negative for adenopathy  Psychiatric/Behavioral: Negative for dysphoric mood and sleep disturbance  The patient is not nervous/anxious  Objective:    Vitals:    09/17/18 1332   BP: 130/80   BP Location: Left arm   Patient Position: Sitting   Cuff Size: Standard   Pulse: 78   Resp: 16   Weight: 82 9 kg (182 lb 12 8 oz)   Height: 5' 8" (1 727 m)        Physical Exam   Constitutional: He is oriented to person, place, and time  He appears well-developed and well-nourished  No distress  Neck: Neck supple  No thyromegaly present     Cardiovascular: Normal rate, regular rhythm and normal heart sounds  No murmur heard  Pulmonary/Chest: Effort normal  No respiratory distress  He has no wheezes  He has no rhonchi  He has no rales  Abdominal: Soft  He exhibits no distension and no mass  There is no tenderness  Musculoskeletal: He exhibits no edema  Lymphadenopathy:     He has no cervical adenopathy  Neurological: He is alert and oriented to person, place, and time  Psychiatric: He has a normal mood and affect  His behavior is normal  Thought content normal        No visits with results within 1 Week(s) from this visit  Latest known visit with results is:   Appointment on 09/10/2018   Component Date Value Ref Range Status    Cholesterol 09/10/2018 207* 50 - 200 mg/dL Final      Cholesterol:       Desirable         <200 mg/dl       Borderline         200-239 mg/dl       High              >239           Triglycerides 09/10/2018 157* <=150 mg/dL Final      Triglyceride:     Normal          <150 mg/dl     Borderline High 150-199 mg/dl     High            200-499 mg/dl        Very High       >499 mg/dl    Specimen collection should occur prior to N-Acetylcysteine or Metamizole administration due to the potential for falsely depressed results   HDL, Direct 09/10/2018 42  40 - 60 mg/dL Final      HDL Cholesterol:       High    >60 mg/dL       Low     <41 mg/dL  Specimen collection should occur prior to Metamizole administration due to the potential for falsley depressed results   LDL Calculated 09/10/2018 134* 0 - 100 mg/dL Final      LDL Cholesterol:     Optimal           <100 mg/dl     Near Optimal      100-129 mg/dl     Above Optimal       Borderline High 130-159 mg/dl       High            160-189 mg/dl       Very High       >189 mg/dl         This screening LDL is a calculated result  It does not have the accuracy of the Direct Measured LDL in the monitoring of patients with hyperlipidemia and/or statin therapy     Direct Measure LDL (SOC366) must be ordered separately in these patients   Sodium 09/10/2018 135* 136 - 145 mmol/L Final    Potassium 09/10/2018 5 0  3 5 - 5 3 mmol/L Final    Chloride 09/10/2018 104  100 - 108 mmol/L Final    CO2 09/10/2018 27  21 - 32 mmol/L Final    ANION GAP 09/10/2018 4  4 - 13 mmol/L Final    BUN 09/10/2018 14  5 - 25 mg/dL Final    Creatinine 09/10/2018 0 87  0 60 - 1 30 mg/dL Final    Standardized to IDMS reference method    Glucose, Fasting 09/10/2018 98  65 - 99 mg/dL Final      Specimen collection should occur prior to Sulfasalazine administration due to the potential for falsely depressed results  Specimen collection should occur prior to Sulfapyridine administration due to the potential for falsely elevated results   Calcium 09/10/2018 9 2  8 3 - 10 1 mg/dL Final    AST 09/10/2018 27  5 - 45 U/L Final      Specimen collection should occur prior to Sulfasalazine administration due to the potential for falsely depressed results   ALT 09/10/2018 48  12 - 78 U/L Final      Specimen collection should occur prior to Sulfasalazine and/or Sulfapyridine administration due to the potential for falsely depressed results       Alkaline Phosphatase 09/10/2018 88  46 - 116 U/L Final    Total Protein 09/10/2018 8 0  6 4 - 8 2 g/dL Final    Albumin 09/10/2018 3 8  3 5 - 5 0 g/dL Final    Total Bilirubin 09/10/2018 0 76  0 20 - 1 00 mg/dL Final    eGFR 09/10/2018 90  ml/min/1 73sq m Final    WBC 09/10/2018 5 71  4 31 - 10 16 Thousand/uL Final    RBC 09/10/2018 5 00  3 88 - 5 62 Million/uL Final    Hemoglobin 09/10/2018 15 7  12 0 - 17 0 g/dL Final    Hematocrit 09/10/2018 46 7  36 5 - 49 3 % Final    MCV 09/10/2018 93  82 - 98 fL Final    MCH 09/10/2018 31 4  26 8 - 34 3 pg Final    MCHC 09/10/2018 33 6  31 4 - 37 4 g/dL Final    RDW 09/10/2018 12 9  11 6 - 15 1 % Final    MPV 09/10/2018 11 0  8 9 - 12 7 fL Final    Platelets 56/83/6701 190  149 - 390 Thousands/uL Final    nRBC 09/10/2018 0  /100 WBCs Final    Neutrophils Relative 09/10/2018 58  43 - 75 % Final    Immat GRANS % 09/10/2018 0  0 - 2 % Final    Lymphocytes Relative 09/10/2018 25  14 - 44 % Final    Monocytes Relative 09/10/2018 13* 4 - 12 % Final    Eosinophils Relative 09/10/2018 3  0 - 6 % Final    Basophils Relative 09/10/2018 1  0 - 1 % Final    Neutrophils Absolute 09/10/2018 3 37  1 85 - 7 62 Thousands/µL Final    Immature Grans Absolute 09/10/2018 0 01  0 00 - 0 20 Thousand/uL Final    Lymphocytes Absolute 09/10/2018 1 42  0 60 - 4 47 Thousands/µL Final    Monocytes Absolute 09/10/2018 0 73  0 17 - 1 22 Thousand/µL Final    Eosinophils Absolute 09/10/2018 0 15  0 00 - 0 61 Thousand/µL Final    Basophils Absolute 09/10/2018 0 03  0 00 - 0 10 Thousands/µL Final    TSH 3RD GENERATON 09/10/2018 4 260* 0 358 - 3 740 uIU/mL Final    Using supplements with high doses of biotin 20 to more than 300 times greater than the adequate daily intake for adults of 30 mcg/day as established by the [employer], can cause falsely depress results   PSA 09/10/2018 2 2  0 0 - 4 0 ng/mL Final      American Urological Association Guidelines define biochemical recurrence of prostate cancer as a detectable or rising PSA value post-radical prostatectomy that is greater than or equal to 0 2 ng/mL with a second confirmatory level of greater than or equal to 0 2 ng/mL   Free T4 09/10/2018 0 82  0 76 - 1 46 ng/dL Final      Specimen collection should occur prior to Sulfasalazine administration due to the potential for falsely elevated results

## 2018-10-01 ENCOUNTER — TELEPHONE (OUTPATIENT)
Dept: FAMILY MEDICINE CLINIC | Facility: CLINIC | Age: 68
End: 2018-10-01

## 2018-10-01 DIAGNOSIS — I10 ESSENTIAL HYPERTENSION: ICD-10-CM

## 2018-10-01 RX ORDER — LOSARTAN POTASSIUM 100 MG/1
100 TABLET ORAL DAILY
Qty: 90 TABLET | Refills: 3 | Status: SHIPPED | OUTPATIENT
Start: 2018-10-01 | End: 2018-11-07

## 2018-10-01 NOTE — TELEPHONE ENCOUNTER
Since the patients bp meds were changed his BP is:    9/18    129/78  9/28     125/78  9/30     121/77    He would also like a refill of Losartan #90 to be sent to  [facility]

## 2018-10-14 DIAGNOSIS — I10 ESSENTIAL HYPERTENSION: ICD-10-CM

## 2018-10-14 RX ORDER — LOSARTAN POTASSIUM 100 MG/1
TABLET ORAL
Qty: 90 TABLET | Refills: 3 | Status: SHIPPED | OUTPATIENT
Start: 2018-10-14 | End: 2019-03-27 | Stop reason: ALTCHOICE

## 2018-10-29 ENCOUNTER — HOSPITAL ENCOUNTER (OUTPATIENT)
Dept: NON INVASIVE DIAGNOSTICS | Facility: CLINIC | Age: 68
Discharge: HOME | End: 2018-10-29
Payer: MEDICARE

## 2018-10-29 DIAGNOSIS — I35.0 NONRHEUMATIC AORTIC VALVE STENOSIS: ICD-10-CM

## 2018-10-29 DIAGNOSIS — I35.1 NONRHEUMATIC AORTIC VALVE INSUFFICIENCY: ICD-10-CM

## 2018-10-29 PROCEDURE — 93306 TTE W/DOPPLER COMPLETE: CPT

## 2018-10-29 PROCEDURE — 93306 TTE W/DOPPLER COMPLETE: CPT | Performed by: INTERNAL MEDICINE

## 2018-11-07 ENCOUNTER — OFFICE VISIT (OUTPATIENT)
Dept: CARDIOLOGY CLINIC | Facility: CLINIC | Age: 68
End: 2018-11-07
Payer: MEDICARE

## 2018-11-07 VITALS
DIASTOLIC BLOOD PRESSURE: 82 MMHG | BODY MASS INDEX: 28.19 KG/M2 | SYSTOLIC BLOOD PRESSURE: 146 MMHG | HEIGHT: 68 IN | HEART RATE: 57 BPM | WEIGHT: 186 LBS

## 2018-11-07 DIAGNOSIS — I10 ESSENTIAL HYPERTENSION: ICD-10-CM

## 2018-11-07 DIAGNOSIS — I35.1 NONRHEUMATIC AORTIC VALVE INSUFFICIENCY: ICD-10-CM

## 2018-11-07 DIAGNOSIS — I10 HYPERTENSION, UNSPECIFIED TYPE: ICD-10-CM

## 2018-11-07 DIAGNOSIS — E78.00 PURE HYPERCHOLESTEROLEMIA: ICD-10-CM

## 2018-11-07 DIAGNOSIS — I35.0 AORTIC VALVE STENOSIS, ETIOLOGY OF CARDIAC VALVE DISEASE UNSPECIFIED: Primary | ICD-10-CM

## 2018-11-07 PROBLEM — Q23.1 BICUSPID AORTIC VALVE: Status: ACTIVE | Noted: 2018-11-07

## 2018-11-07 PROBLEM — Q23.81 BICUSPID AORTIC VALVE: Status: ACTIVE | Noted: 2018-11-07

## 2018-11-07 PROCEDURE — 93000 ELECTROCARDIOGRAM COMPLETE: CPT | Performed by: INTERNAL MEDICINE

## 2018-11-07 PROCEDURE — 99214 OFFICE O/P EST MOD 30 MIN: CPT | Performed by: INTERNAL MEDICINE

## 2018-11-07 NOTE — PROGRESS NOTES
Cardiology Follow Up Visit    Shane Rao  1950  [id number]  [address] [facility] [phone]    1  Aortic valve stenosis, etiology of cardiac valve disease unspecified  POCT ECG    Echo complete with contrast if indicated   2  Hypertension, unspecified type  POCT ECG   3  Nonrheumatic aortic valve insufficiency     4  Essential hypertension     5  Pure hypercholesterolemia         Interval History:   Patient here follow-up history of mild aortic valve stenosis and 1 to 2+ aortic valve regurgitation with a bicuspid aortic valve  Coincidental, finding on echocardiogram     Had a history of amaurosis fugax 2016  No recurrence  Has hyperlipidemia  Intolerant to Crestor  On atorvastatin 40 mg daily  He could not afford Zetia    She also has history of essential hypertension, this is overall been well controlled  Has a history of normal left ventricular systolic function  2D echocardiogram performed October 2018 shows good left ventricular systolic function, bicuspid aortic valve with minimal thickening, 1 to 2+ aortic valve regurgitation  LDL as detailed below on atorvastatin 40 mg daily  He is on primary prevention statin      Essential hypertension, blood pressure mildly elevated on occasion but overall well controlled      Patient Active Problem List   Diagnosis    Serrated adenoma of colon    Aortic stenosis    Aortic regurgitation    Pure hypercholesterolemia    Essential hypertension    Bicuspid aortic valve     Past Medical History:   Diagnosis Date    Adenomatous colon polyp     Aortic regurgitation     Aortic stenosis     Diverticulosis     Heart murmur     Hemorrhoids     Last Assessed: 11/10/17    Hyperlipidemia     Hypertension     Serrated adenoma of colon     Visual disturbances     Vitamin D deficiency      Social History     Social History  Marital status: /Civil Union     Spouse name: N/A    Number of children: N/A    Years of education: N/A     Occupational History    Not on file  Social History Main Topics    Smoking status: Never Smoker    Smokeless tobacco: Never Used    Alcohol use Yes      Comment: 2 beers daily  Per [employer]scripts: minimal usage    Drug use: No    Sexual activity: Not on file     Other Topics Concern    Not on file     Social History Narrative    Caffeine use: 3 cups coffe per day    Has smoke detectors    Uses safety equipment: seatbelts      Family History   Problem Relation Age of Onset    Asthma Mother     Heart failure Mother         Congestive    Depression Mother     Alcohol abuse Father     Stroke Father         CVA    Thyroid disease Sister     Heart attack Brother         Myocardial Infarction    Depression Brother     Stroke Family         Syndrome     Past Surgical History:   Procedure Laterality Date    COLONOSCOPY      COLONOSCOPY N/A 11/28/2017    Procedure: COLONOSCOPY with polypectomy, random bx, and hemorrhoid banding;  Surgeon: Antoine Bowden MD;  Location: [facility]; Service: General    COLONOSCOPY N/A 2/26/2018    Procedure: COLONOSCOPY;  Surgeon: Saul Regalado MD;  Location: [facility]; Service: Gastroenterology    MOUTH SURGERY      [facility] N/A 11/29/2016    Procedure: COLONOSCOPY POSSIBLE BANDING;  Surgeon: Antoine Bowden MD;  Location: [facility];   Service: General       Current Outpatient Prescriptions:     aspirin 81 MG tablet, Take 81 mg by mouth daily, Disp: , Rfl:     atorvastatin (LIPITOR) 40 mg tablet, Take 1 tablet (40 mg total) by mouth daily, Disp: 90 tablet, Rfl: 3    Cholecalciferol (VITAMIN D-400 PO), Take by mouth, Disp: , Rfl:     docusate sodium (COLACE) 100 mg capsule, Take 1 capsule by mouth 2 (two) times a day for 30 days, Disp: 60 capsule, Rfl: 0    losartan (COZAAR) 100 MG tablet, TAKE 1 TABLET BY MOUTH EVERY DAY, Disp: 90 tablet, Rfl: 3    Multiple Vitamins-Minerals (CENTRUM SILVER PO), Take by mouth daily, Disp: , Rfl:     Omega-3 Fatty Acids (OMEGA 3 PO), Take by mouth daily  , Disp: , Rfl:   Allergies   Allergen Reactions    Sulfa Antibiotics Rash     redness         Social, Family, Medication history reviewed and updated as necessary      Labs:     Lab Results   Component Value Date     09/01/2015    K 5 0 09/10/2018     09/10/2018    CO2 27 09/10/2018    BUN 14 09/10/2018    CREATININE 0 87 09/10/2018    CREATININE 0 88 03/12/2018    GLUCOSE 94 09/01/2015    CALCIUM 9 2 09/10/2018       Lab Results   Component Value Date    WBC 5 71 09/10/2018    HGB 15 7 09/10/2018    HGB 14 9 09/11/2017    HCT 46 7 09/10/2018    HCT 43 0 09/11/2017     09/10/2018     09/11/2017       Lab Results   Component Value Date    CHOL 196 09/01/2015    CHOL 224 02/27/2015     Lab Results   Component Value Date    HDL 42 09/10/2018    HDL 41 03/12/2018     Lab Results   Component Value Date    LDLCALC 134 (H) 09/10/2018    LDLCALC 127 (H) 03/12/2018     Lab Results   Component Value Date    TRIG 157 (H) 09/10/2018    TRIG 164 (H) 03/12/2018       Lab Results   Component Value Date    ALT 48 09/10/2018    ALT 47 03/12/2018    AST 27 09/10/2018    AST 64 (H) 03/12/2018       No results found for: INR    No results found for: NTBNP    No results found for: HGBA1C        Imaging: Reviewed in epic    Review of Systems:  14 systems reviewed and negative with exception of the above     Review of Systems    PHYSICAL EXAM:    Physical Exam    Vitals:    11/07/18 1338   BP: 146/82   Pulse: 57     Body mass index is 28 28 kg/m²    Weight (last 2 days)     Date/Time   Weight    11/07/18 1338  84 4 (186)              Gen: No acute distress  HEENT: anicteric, mucous membranes moist  Neck: supple, no jugular venous distention, or carotid bruit  Heart: regular, normal s1 and s2, no murmur/rub or gallop  Lungs :clear to auscultation bilaterally, no rales/rhonchi or wheeze  Abdomen: soft nontender, normoactive bowel sounds, no organomegaly  Ext: warm and perfused, normal femoral pulses, no edema, or clubbing  Skin: warm, no rashes  Neuro: AAO x 3, no focal findings  Psychiatric: normal affect  Musculoskeletal: no obvious joint deformities  Discussion/Summary:    1  Bicuspid aortic valve with mild aortic valve stenosis, 1 to 2+ aortic valve regurgitation, aortic root/ascending aorta 38 mm    2  Hyperlipidemia, tolerating atorvastatin 40 mg daily, could not afford Zetia in the past, intolerant to Crestor    3  Hypertension, slightly elevated today, overall controlled    Plan  Patient continues to do well  Bicuspid aortic valve as detailed with minimal thickening  There is 1 to 2+ aortic valve regurgitation with top normal ascending aortic size at approximately 37to 38 mm  He is very active walks several miles daily  Will plan on annual follow-up with [facility] lipids and echocardiogram to follow his aortic valve regurgitation    Continue Mediterranean/sensible type diet and daily exercise

## 2018-12-17 ENCOUNTER — OFFICE VISIT (OUTPATIENT)
Dept: FAMILY MEDICINE CLINIC | Facility: CLINIC | Age: 68
End: 2018-12-17
Payer: MEDICARE

## 2018-12-17 VITALS
WEIGHT: 184.8 LBS | RESPIRATION RATE: 16 BRPM | HEART RATE: 60 BPM | SYSTOLIC BLOOD PRESSURE: 136 MMHG | BODY MASS INDEX: 28.01 KG/M2 | DIASTOLIC BLOOD PRESSURE: 88 MMHG | HEIGHT: 68 IN

## 2018-12-17 DIAGNOSIS — I10 ESSENTIAL HYPERTENSION: Primary | ICD-10-CM

## 2018-12-17 DIAGNOSIS — I35.1 NONRHEUMATIC AORTIC VALVE INSUFFICIENCY: ICD-10-CM

## 2018-12-17 DIAGNOSIS — E78.5 HYPERLIPIDEMIA, UNSPECIFIED HYPERLIPIDEMIA TYPE: ICD-10-CM

## 2018-12-17 DIAGNOSIS — R79.89 ELEVATED TSH: ICD-10-CM

## 2018-12-17 PROCEDURE — 99214 OFFICE O/P EST MOD 30 MIN: CPT | Performed by: FAMILY MEDICINE

## 2018-12-18 NOTE — PROGRESS NOTES
Assessment/Plan:  1  Essential hypertension  - BP is mildly elevated today, continue Losartan 100 mg daily, discussed low sodium diet and regular exercise  - Comprehensive metabolic panel; Future  - TSH, 3rd generation with Free T4 reflex; Future  - Urinalysis with reflex to microscopic; Future    2  Hyperlipidemia, unspecified hyperlipidemia type  - continue Atorvastatin 40 mg daily, discussed low fat/ low cholesterol diet and regular exercise, will recheck lipid panel and CMP in 3-4 months   - Lipid Panel with Direct LDL reflex; Future  - TSH, 3rd generation with Free T4 reflex; Future    3  Elevated TSH  - s/s of hypothyroidism discussed, recheck TSH  - TSH, 3rd generation with Free T4 reflex; Future    4  Nonrheumatic aortic valve insufficiency  - follow up with Cardiology    RTO in 3-4 months or sooner as needed  The treatment plan was reviewed with the patient  The patient understands and agrees with the treatment plan      Subjective:   Chief Complaint   Patient presents with    Hypertension    Hyperlipidemia    Hypothyroidism      Patient ID: Mazin Olmos is a 80 y o  male who presents for recheck on his HTN, hyperlipidemia and review his lab results  He reports home BP readings systolic ranging 873-281'W and occasionally 275'G and diastolic in 70-04'V  He has been compliant with his BP medications, he has been following a well balanced diet and exercising regularly  He denies chest pain, SOB, leg edema, palpitations, orthopnea, PND, dizziness or syncope           The following portions of the patient's history were reviewed and updated as appropriate: allergies, current medications, past family history, past medical history, past social history, past surgical history and problem list     Past Medical History:   Diagnosis Date    Adenomatous colon polyp     Aortic regurgitation     Aortic stenosis     Diverticulosis     Heart murmur     Hemorrhoids     Last Assessed: 11/10/17    Hyperlipidemia     Hypertension     Serrated adenoma of colon     Visual disturbances     Vitamin D deficiency      Past Surgical History:   Procedure Laterality Date    COLONOSCOPY      COLONOSCOPY N/A 11/28/2017    Procedure: COLONOSCOPY with polypectomy, random bx, and hemorrhoid banding;  Surgeon: Victorino Cortés MD;  Location: [facility]; Service: General    COLONOSCOPY N/A 2/26/2018    Procedure: COLONOSCOPY;  Surgeon: Amy Killian MD;  Location: [facility]; Service: Gastroenterology    MOUTH SURGERY      SC COLONOSCOPY FLX DX W/COLLJ [facility] WHEN PFRMD N/A 11/29/2016    Procedure: COLONOSCOPY POSSIBLE BANDING;  Surgeon: Victorino Coréts MD;  Location: [facility]; Service: General     Family History   Problem Relation Age of Onset    Asthma Mother     Heart failure Mother         Congestive    Depression Mother     Alcohol abuse Father     Stroke Father         CVA    Thyroid disease Sister     Heart attack Brother         Myocardial Infarction    Depression Brother     Stroke Family         Syndrome     Social History     Social History    Marital status: /Civil Union     Spouse name: N/A    Number of children: N/A    Years of education: N/A     Occupational History    Not on file  Social History Main Topics    Smoking status: Never Smoker    Smokeless tobacco: Never Used    Alcohol use Yes      Comment: 2 beers daily   Per [employer]: minimal usage    Drug use: No    Sexual activity: Not on file     Other Topics Concern    Not on file     Social History Narrative    Caffeine use: 3 cups coffe per day    Has smoke detectors    Uses safety equipment: seatbelts       Current Outpatient Prescriptions:     aspirin 81 MG tablet, Take 81 mg by mouth daily, Disp: , Rfl:     atorvastatin (LIPITOR) 40 mg tablet, Take 1 tablet (40 mg total) by mouth daily, Disp: 90 tablet, Rfl: 3    Cholecalciferol (VITAMIN D-400 PO), Take by mouth, Disp: , Rfl:     losartan (COZAAR) 100 MG tablet, TAKE 1 TABLET BY MOUTH EVERY DAY, Disp: 90 tablet, Rfl: 3    Multiple Vitamins-Minerals (CENTRUM SILVER PO), Take by mouth daily, Disp: , Rfl:     Omega-3 Fatty Acids (OMEGA 3 PO), Take by mouth daily  , Disp: , Rfl:     docusate sodium (COLACE) 100 mg capsule, Take 1 capsule by mouth 2 (two) times a day for 30 days, Disp: 60 capsule, Rfl: 0    Review of Systems   Constitutional: Negative for appetite change, chills, fatigue, fever and unexpected weight change  Respiratory: Negative for cough, shortness of breath and wheezing  Cardiovascular: Negative for chest pain, palpitations and leg swelling  Gastrointestinal: Negative for abdominal pain, blood in stool, constipation, diarrhea, nausea and vomiting  Genitourinary: Negative for difficulty urinating, dysuria, frequency, hematuria and urgency  Musculoskeletal: Negative for arthralgias, joint swelling and myalgias  Skin: Negative for rash  Neurological: Negative for dizziness, syncope, weakness, numbness and headaches  Hematological: Negative for adenopathy  Psychiatric/Behavioral: Negative for dysphoric mood and sleep disturbance  The patient is not nervous/anxious  Objective:    Vitals:    12/17/18 1056 12/17/18 1115   BP: 142/98 136/88   BP Location: Left arm Left arm   Patient Position: Sitting Sitting   Cuff Size: Standard Adult   Pulse: 60    Resp: 16    Weight: 83 8 kg (184 lb 12 8 oz)    Height: 5' 8" (1 727 m)         Physical Exam   Constitutional: He is oriented to person, place, and time  He appears well-developed and well-nourished  No distress  Neck: Neck supple  No thyromegaly present  Cardiovascular: Normal rate, regular rhythm and normal heart sounds  No murmur heard  Pulmonary/Chest: Effort normal  No respiratory distress  He has no wheezes  He has no rhonchi  He has no rales  Abdominal: Soft  He exhibits no distension and no mass  There is no tenderness  Musculoskeletal: He exhibits no edema  Lymphadenopathy:     He has no cervical adenopathy  Neurological: He is alert and oriented to person, place, and time  Psychiatric: He has a normal mood and affect   His behavior is normal  Thought content normal          Lab Results   Component Value Date    CHOL 196 09/01/2015    CHOL 224 02/27/2015    CHOL 244 11/04/2014     Lab Results   Component Value Date    HDL 42 09/10/2018    HDL 41 03/12/2018    HDL 42 09/11/2017     Lab Results   Component Value Date    LDLCALC 134 (H) 09/10/2018    LDLCALC 127 (H) 03/12/2018    LDLCALC 145 (H) 09/11/2017     Lab Results   Component Value Date    TRIG 157 (H) 09/10/2018    TRIG 164 (H) 03/12/2018    TRIG 124 09/11/2017     Lab Results   Component Value Date     09/01/2015    K 5 0 09/10/2018     09/10/2018    CO2 27 09/10/2018    ANIONGAP 9 09/01/2015    BUN 14 09/10/2018    CREATININE 0 87 09/10/2018    GLUCOSE 94 09/01/2015    GLUF 98 09/10/2018    CALCIUM 9 2 09/10/2018    AST 27 09/10/2018    ALT 48 09/10/2018    ALKPHOS 88 09/10/2018    PROT 7 5 09/01/2015    BILITOT 0 57 09/01/2015    EGFR 90 09/10/2018     Lab Results   Component Value Date    ALN7JRERWKBQ 4 260 (H) 09/10/2018     Lab Results   Component Value Date    WBC 5 71 09/10/2018    HGB 15 7 09/10/2018    HCT 46 7 09/10/2018    MCV 93 09/10/2018     09/10/2018     Lab Results   Component Value Date    PSA 2 2 09/10/2018    PSA 1 6 09/11/2017    PSA 1 7 08/29/2016

## 2019-03-12 ENCOUNTER — APPOINTMENT (OUTPATIENT)
Dept: LAB | Facility: CLINIC | Age: 69
End: 2019-03-12
Payer: MEDICARE

## 2019-03-12 DIAGNOSIS — R79.89 ELEVATED TSH: ICD-10-CM

## 2019-03-12 DIAGNOSIS — I10 ESSENTIAL HYPERTENSION: ICD-10-CM

## 2019-03-12 DIAGNOSIS — E78.5 HYPERLIPIDEMIA, UNSPECIFIED HYPERLIPIDEMIA TYPE: ICD-10-CM

## 2019-03-12 LAB
ALBUMIN SERPL BCP-MCNC: 3.9 G/DL (ref 3.5–5)
ALP SERPL-CCNC: 84 U/L (ref 46–116)
ALT SERPL W P-5'-P-CCNC: 44 U/L (ref 12–78)
ANION GAP SERPL CALCULATED.3IONS-SCNC: 8 MMOL/L (ref 4–13)
AST SERPL W P-5'-P-CCNC: 23 U/L (ref 5–45)
BILIRUB SERPL-MCNC: 0.66 MG/DL (ref 0.2–1)
BILIRUB UR QL STRIP: NEGATIVE
BUN SERPL-MCNC: 14 MG/DL (ref 5–25)
CALCIUM SERPL-MCNC: 9.1 MG/DL (ref 8.3–10.1)
CHLORIDE SERPL-SCNC: 104 MMOL/L (ref 100–108)
CHOLEST SERPL-MCNC: 209 MG/DL (ref 50–200)
CLARITY UR: CLEAR
CO2 SERPL-SCNC: 25 MMOL/L (ref 21–32)
COLOR UR: YELLOW
CREAT SERPL-MCNC: 0.8 MG/DL (ref 0.6–1.3)
GFR SERPL CREATININE-BSD FRML MDRD: 92 ML/MIN/1.73SQ M
GLUCOSE P FAST SERPL-MCNC: 92 MG/DL (ref 65–99)
GLUCOSE UR STRIP-MCNC: NEGATIVE MG/DL
HDLC SERPL-MCNC: 46 MG/DL (ref 40–60)
HGB UR QL STRIP.AUTO: NEGATIVE
KETONES UR STRIP-MCNC: NEGATIVE MG/DL
LDLC SERPL CALC-MCNC: 140 MG/DL (ref 0–100)
LEUKOCYTE ESTERASE UR QL STRIP: NEGATIVE
NITRITE UR QL STRIP: NEGATIVE
PH UR STRIP.AUTO: 6.5 [PH]
POTASSIUM SERPL-SCNC: 3.8 MMOL/L (ref 3.5–5.3)
PROT SERPL-MCNC: 7.7 G/DL (ref 6.4–8.2)
PROT UR STRIP-MCNC: NEGATIVE MG/DL
SODIUM SERPL-SCNC: 137 MMOL/L (ref 136–145)
SP GR UR STRIP.AUTO: 1.01 (ref 1–1.03)
TRIGL SERPL-MCNC: 117 MG/DL
TSH SERPL DL<=0.05 MIU/L-ACNC: 3.56 UIU/ML (ref 0.36–3.74)
UROBILINOGEN UR QL STRIP.AUTO: 0.2 E.U./DL

## 2019-03-12 PROCEDURE — 36415 COLL VENOUS BLD VENIPUNCTURE: CPT

## 2019-03-12 PROCEDURE — 81003 URINALYSIS AUTO W/O SCOPE: CPT

## 2019-03-12 PROCEDURE — 84443 ASSAY THYROID STIM HORMONE: CPT

## 2019-03-12 PROCEDURE — 80053 COMPREHEN METABOLIC PANEL: CPT

## 2019-03-12 PROCEDURE — 80061 LIPID PANEL: CPT

## 2019-03-27 ENCOUNTER — OFFICE VISIT (OUTPATIENT)
Dept: FAMILY MEDICINE CLINIC | Facility: CLINIC | Age: 69
End: 2019-03-27
Payer: MEDICARE

## 2019-03-27 VITALS
RESPIRATION RATE: 16 BRPM | HEART RATE: 60 BPM | DIASTOLIC BLOOD PRESSURE: 80 MMHG | SYSTOLIC BLOOD PRESSURE: 130 MMHG | HEIGHT: 68 IN | BODY MASS INDEX: 27.8 KG/M2 | WEIGHT: 183.4 LBS

## 2019-03-27 DIAGNOSIS — Z12.5 SCREENING FOR PROSTATE CANCER: ICD-10-CM

## 2019-03-27 DIAGNOSIS — I10 ESSENTIAL HYPERTENSION: ICD-10-CM

## 2019-03-27 DIAGNOSIS — E03.8 SUBCLINICAL HYPOTHYROIDISM: ICD-10-CM

## 2019-03-27 DIAGNOSIS — Z00.00 MEDICARE ANNUAL WELLNESS VISIT, INITIAL: Primary | ICD-10-CM

## 2019-03-27 DIAGNOSIS — I35.1 NONRHEUMATIC AORTIC VALVE INSUFFICIENCY: ICD-10-CM

## 2019-03-27 DIAGNOSIS — E78.5 HYPERLIPIDEMIA, UNSPECIFIED HYPERLIPIDEMIA TYPE: ICD-10-CM

## 2019-03-27 PROCEDURE — G0438 PPPS, INITIAL VISIT: HCPCS | Performed by: FAMILY MEDICINE

## 2019-03-27 PROCEDURE — 99214 OFFICE O/P EST MOD 30 MIN: CPT | Performed by: FAMILY MEDICINE

## 2019-03-27 RX ORDER — OLMESARTAN MEDOXOMIL 20 MG/1
20 TABLET ORAL DAILY
Qty: 30 TABLET | Refills: 0 | Status: SHIPPED | OUTPATIENT
Start: 2019-03-27 | End: 2019-03-28 | Stop reason: ALTCHOICE

## 2019-03-27 NOTE — PROGRESS NOTES
Assessment and Plan:    1  Medicare annual wellness visit, initial  - discussed Shingrix and Td  - due for colonoscopy in 02/2021  - follow up in 1 year for annual wellness visit    Health Maintenance Due   Topic Date Due    Medicare Annual Wellness Visit (AWV)  1950    BMI: Followup Plan  12/10/1969    DTaP,Tdap,and Td Vaccines (2 - Td) 08/12/2018    CRC Screening: Colonoscopy  02/26/2019    Depression Screening PHQ  03/23/2019     HPI:  Donna Benoit is a 80 y o  male here for his Initial Wellness Visit  Patient Active Problem List   Diagnosis    Serrated adenoma of colon    Aortic stenosis    Aortic regurgitation    Pure hypercholesterolemia    Essential hypertension    Bicuspid aortic valve     Past Medical History:   Diagnosis Date    Adenomatous colon polyp     Aortic regurgitation     Aortic stenosis     Diverticulosis     Heart murmur     Hemorrhoids     Last Assessed: 11/10/17    Hyperlipidemia     Hypertension     Serrated adenoma of colon     Visual disturbances     Vitamin D deficiency      Past Surgical History:   Procedure Laterality Date    COLONOSCOPY      COLONOSCOPY N/A 11/28/2017    Procedure: COLONOSCOPY with polypectomy, random bx, and hemorrhoid banding;  Surgeon: Emory Apple MD;  Location: [facility]; Service: General    COLONOSCOPY N/A 2/26/2018    Procedure: COLONOSCOPY;  Surgeon: Nita Garcia MD;  Location: [facility]; Service: Gastroenterology    MOUTH SURGERY      [place] COLONOSCOPY FLX DX W/COLLJ [facility] WHEN PFRMD N/A 11/29/2016    Procedure: COLONOSCOPY POSSIBLE BANDING;  Surgeon: Emory Apple MD;  Location: [facility];   Service: General     Family History   Problem Relation Age of Onset    Asthma Mother     Heart failure Mother         Congestive    Depression Mother     Alcohol abuse Father     Stroke Father         CVA    Thyroid disease Sister     Heart attack Brother         Myocardial Infarction    Depression Brother     Stroke Family         Syndrome     Social History     Tobacco Use   Smoking Status Never Smoker   Smokeless Tobacco Never Used     Social History     Substance and Sexual Activity   Alcohol Use Yes    Comment: 2 beers daily  Per [employer]: minimal usage      Social History     Substance and Sexual Activity   Drug Use No       Current Outpatient Medications   Medication Sig Dispense Refill    aspirin 81 MG tablet Take 81 mg by mouth daily      atorvastatin (LIPITOR) 40 mg tablet Take 1 tablet (40 mg total) by mouth daily 90 tablet 3    Cholecalciferol (VITAMIN D-400 PO) Take by mouth      docusate sodium (COLACE) 50 mg capsule Take by mouth daily      losartan (COZAAR) 100 MG tablet TAKE 1 TABLET BY MOUTH EVERY DAY 90 tablet 3    Multiple Vitamins-Minerals (CENTRUM SILVER PO) Take by mouth daily      Omega-3 Fatty Acids (OMEGA 3 PO) Take by mouth daily        docusate sodium (COLACE) 100 mg capsule Take 1 capsule by mouth 2 (two) times a day for 30 days 60 capsule 0     No current facility-administered medications for this visit  Allergies   Allergen Reactions    Sulfa Antibiotics Rash     redness     Immunization History   Administered Date(s) Administered    INFLUENZA 11/19/2013    Influenza Quadrivalent Preservative Free 3 years and older IM 09/11/2015    Influenza Quadrivalent, 6-35 Months IM 11/10/2014, 09/09/2016    Influenza Split High Dose Preservative Free IM 09/18/2017    Influenza TIV (IM) 08/30/2011, 11/13/2012    Influenza, high dose seasonal 0 5 mL 09/17/2018    Pneumococcal Conjugate 13-Valent 03/10/2017    Pneumococcal Polysaccharide PPV23 03/23/2018    Tdap 08/12/2008       Patient Care Team:  Bandar Gregory MD as PCP - MD Sully [place], Katharina Mendoza MD    Medicare Screening Tests and Risk Assessments:  Lorie Feldman is here for his Initial Wellness visit  Health Risk Assessment:  Patient rates overall health as very good   Patient feels that their physical health rating is Same  Eyesight was rated as Same  Hearing was rated as Same  Patient feels that their emotional and mental health rating is Same  Pain experienced by patient in the last 7 days has been None  Patient states that he has experienced no weight loss or gain in last 6 months  Emotional/Mental Health:  Patient has been feeling nervous/anxious  PHQ-9 Depression Screening:    Frequency of the following problems over the past two weeks:      1  Little interest or pleasure in doing things: 0 - not at all      2  Feeling down, depressed, or hopeless: 0 - not at all  PHQ-2 Score: 0          Broken Bones/Falls: Fall Risk Assessment:    In the past year, patient has experienced: No history of falling in past year          Bladder/Bowel:  Patient has not leaked urine accidently in the last six months  Patient reports no loss of bowel control  Immunizations:  Patient has had a flu vaccination within the last year  Patient has received a pneumonia shot  Patient has not received a shingles shot  Patient has received tetanus/diphtheria shot  Home Safety:  Patient does not have trouble with stairs inside or outside of their home  Patient currently reports that there are no safety hazards present in home, working smoke alarms, working carbon monoxide detectors  Preventative Screenings:   prostate cancer screen performed, colon cancer screen completed, cholesterol screen completed, glaucoma eye exam completed,     Nutrition:  Current diet: Regular and Limited junk food with servings of the following:    Medications:  Patient is currently taking over-the-counter supplements  Patient is able to manage medications  Lifestyle Choices:  Patient reports no tobacco use  Patient has not smoked or used tobacco in the past   Patient reports alcohol use  Alcohol use per week: 2 cans of beer a day  Patient drives a vehicle  Patient wears seat belt      Current level of exercise of physical activity described by patient as: House work , yard work and walk  Activities of Daily Living:  Can get out of bed by his or her self, able to dress self, able to make own meals, able to do own shopping, able to bathe self, can do own laundry/housekeeping, can manage own money, pay bills and track expenses    Previous Hospitalizations:  No hospitalization or ED visit in past 12 months        Advanced Directives:  Patient has not decided on power of   Patient has not completed advanced directive  Additional Comments: Pt refused to bring copy in  Social Support: Marty Hugo    Preventative Screening/Counseling:      Cardiovascular:      General: Screening Current          Diabetes:      General: Screening Current          Colorectal Cancer:      General: Screening Current          Prostate Cancer:      General: Screening Current          Osteoporosis:      General: Screening Not Indicated          AAA:      General: Screening Not Indicated          Glaucoma:      General: Screening Current          HIV:      General: Screening Not Indicated          Hepatitis C:      General: Screening Current        Advanced Directives:   Patient has no living will for healthcare, Additional Comments: Patient declines paperwork    Immunizations:      Influenza: Influenza UTD This Year      Pneumococcal: Lifetime Vaccine Completed      Shingrix: Risks & Benefits Discussed      TD: Risks & Benefits Discussed and Patient Declines      Other Preventative Counseling (Non-Medicare):   Fall Prevention, Skin self-exam and Sunscreen use

## 2019-03-28 ENCOUNTER — TELEPHONE (OUTPATIENT)
Dept: FAMILY MEDICINE CLINIC | Facility: CLINIC | Age: 69
End: 2019-03-28

## 2019-03-28 DIAGNOSIS — I10 ESSENTIAL HYPERTENSION: Primary | ICD-10-CM

## 2019-03-28 RX ORDER — CANDESARTAN 16 MG/1
16 TABLET ORAL DAILY
Qty: 30 TABLET | Refills: 1 | Status: SHIPPED | OUTPATIENT
Start: 2019-03-28 | End: 2019-04-05 | Stop reason: ALTCHOICE

## 2019-03-28 NOTE — PROGRESS NOTES
Assessment/Plan:  1  Medicare annual wellness visit, initial    2  Essential hypertension  - will d/c Losartan and start Benicar 20 mg daily, advised home BP monitoring and call the office in 2 weeks with home BP readings  - olmesartan (BENICAR) 20 mg tablet; Take 1 tablet (20 mg total) by mouth daily  Dispense: 30 tablet; Refill: 0  - Comprehensive metabolic panel; Future    3  Hyperlipidemia, unspecified hyperlipidemia type  - , , continue Atorvastatin 40 mg daily, discussed diet/exercise, will repeat lipid panel and CMP in 6 months  - Lipid Panel with Direct LDL reflex; Future  - Comprehensive metabolic panel; Future    4  Subclinical hypothyroidism  - TSH 3 56 WNL, will continue monitoring  - TSH, 3rd generation with Free T4 reflex; Future    5  Nonrheumatic aortic valve insufficiency  - follow up with Cardiology     6  Screening for prostate cancer  - PSA, Total Screen; Future    Follow up in 6 months with labs done prior  Possible side effects of new medications were reviewed with the patient today  The treatment plan was reviewed with the patient  The patient understands and agrees with the treatment plan      Subjective:   Chief Complaint   Patient presents with    Medicare Wellness Visit    Hypertension    Hyperlipidemia    Elevated TSH      Patient ID: Brooklyn Garcia is a 80 y o  male who presents for follow up for hypertension, hyperlipidemia, subclinical hypothyroidism and review his lab results  Patient has no complaints today, he received a letter from [facility] mail order pharmacy about Losartan recall and would like to discuss changing his medication      Patient states he has been walking for 2 miles 3-4 days a week and denies chest pain, SOB, leg edema, palpitations, orthopnea, paroxysmal nocturnal dyspnea, dizziness or syncope       The following portions of the patient's history were reviewed and updated as appropriate: allergies, current medications, past family history, past medical history, past social history, past surgical history and problem list     Past Medical History:   Diagnosis Date    Adenomatous colon polyp     Aortic regurgitation     Aortic stenosis     Diverticulosis     Heart murmur     Hemorrhoids     Last Assessed: 11/10/17    Hyperlipidemia     Hypertension     Serrated adenoma of colon     Visual disturbances     Vitamin D deficiency      Past Surgical History:   Procedure Laterality Date    COLONOSCOPY      COLONOSCOPY N/A 11/28/2017    Procedure: COLONOSCOPY with polypectomy, random bx, and hemorrhoid banding;  Surgeon: Dejan Cunningham MD;  Location: [facility]; Service: General    COLONOSCOPY N/A 2/26/2018    Procedure: COLONOSCOPY;  Surgeon: Deja Blanton MD;  Location: [facility]; Service: Gastroenterology    MOUTH SURGERY      [place] COLONOSCOPY FLX DX W/COLLJ [facility] WHEN PFRMD N/A 11/29/2016    Procedure: COLONOSCOPY POSSIBLE BANDING;  Surgeon: Dejan Cunningham MD;  Location: [facility];   Service: General     Family History   Problem Relation Age of Onset    Asthma Mother     Heart failure Mother         Congestive    Depression Mother     Alcohol abuse Father     Stroke Father         CVA    Thyroid disease Sister     Heart attack Brother         Myocardial Infarction    Depression Brother     Stroke Family         Syndrome     Social History     Socioeconomic History    Marital status: /Civil Union     Spouse name: Not on file    Number of children: Not on file    Years of education: Not on file    Highest education level: Not on file   Occupational History    Not on file   Social Needs    Financial resource strain: Not on file    Food insecurity:     Worry: Not on file     Inability: Not on file    Transportation needs:     Medical: Not on file     Non-medical: Not on file   Tobacco Use    Smoking status: Never Smoker    Smokeless tobacco: Never Used   Substance and Sexual Activity    Alcohol use: Yes     Comment: 2 beers daily  Per [employer]: minimal usage    Drug use: No    Sexual activity: Not on file   Lifestyle    Physical activity:     Days per week: Not on file     Minutes per session: Not on file    Stress: Not on file   Relationships    Social connections:     Talks on phone: Not on file     Gets together: Not on file     Attends Anabaptism service: Not on file     Active member of club or organization: Not on file     Attends meetings of clubs or organizations: Not on file     Relationship status: Not on file    Intimate partner violence:     Fear of current or ex partner: Not on file     Emotionally abused: Not on file     Physically abused: Not on file     Forced sexual activity: Not on file   Other Topics Concern    Not on file   Social History Narrative    Caffeine use: 3 cups coffe per day    Has smoke detectors    Uses safety equipment: seatbelts       Current Outpatient Medications:     aspirin 81 MG tablet, Take 81 mg by mouth daily, Disp: , Rfl:     atorvastatin (LIPITOR) 40 mg tablet, Take 1 tablet (40 mg total) by mouth daily, Disp: 90 tablet, Rfl: 3    Cholecalciferol (VITAMIN D-400 PO), Take by mouth, Disp: , Rfl:     docusate sodium (COLACE) 50 mg capsule, Take by mouth daily, Disp: , Rfl:     Multiple Vitamins-Minerals (CENTRUM SILVER PO), Take by mouth daily, Disp: , Rfl:     Omega-3 Fatty Acids (OMEGA 3 PO), Take by mouth daily  , Disp: , Rfl:     docusate sodium (COLACE) 100 mg capsule, Take 1 capsule by mouth 2 (two) times a day for 30 days, Disp: 60 capsule, Rfl: 0    olmesartan (BENICAR) 20 mg tablet, Take 1 tablet (20 mg total) by mouth daily, Disp: 30 tablet, Rfl: 0    Review of Systems   Constitutional: Negative for appetite change, chills, fatigue, fever and unexpected weight change  Respiratory: Negative for cough, shortness of breath and wheezing  Cardiovascular: Negative for chest pain, palpitations and leg swelling     Gastrointestinal: Negative for abdominal pain, blood in stool, constipation, diarrhea, nausea and vomiting  Genitourinary: Negative for difficulty urinating, dysuria, frequency, hematuria and urgency  Musculoskeletal: Negative for arthralgias, joint swelling and myalgias  Skin: Negative for rash  Neurological: Negative for dizziness, syncope, weakness, numbness and headaches  Hematological: Negative for adenopathy  Psychiatric/Behavioral: Negative for dysphoric mood and sleep disturbance  The patient is not nervous/anxious  Objective:    Vitals:    03/27/19 1056   BP: 130/80   BP Location: Left arm   Patient Position: Sitting   Cuff Size: Standard   Pulse: 60   Resp: 16   Weight: 83 2 kg (183 lb 6 4 oz)   Height: 5' 8" (1 727 m)     BP Readings from Last 3 Encounters:   03/27/19 130/80   12/17/18 136/88   11/07/18 146/82     Wt Readings from Last 3 Encounters:   03/27/19 83 2 kg (183 lb 6 4 oz)   12/17/18 83 8 kg (184 lb 12 8 oz)   11/07/18 84 4 kg (186 lb)      Physical Exam   Constitutional: He is oriented to person, place, and time  He appears well-developed and well-nourished  No distress  Neck: Neck supple  No thyromegaly present  Cardiovascular: Normal rate, regular rhythm and normal heart sounds  No murmur heard  Pulmonary/Chest: Effort normal  No respiratory distress  He has no wheezes  He has no rhonchi  He has no rales  Abdominal: Soft  He exhibits no distension and no mass  There is no tenderness  Musculoskeletal: He exhibits no edema  Lymphadenopathy:     He has no cervical adenopathy  Neurological: He is alert and oriented to person, place, and time  Psychiatric: He has a normal mood and affect  His behavior is normal  Thought content normal        No visits with results within 1 Week(s) from this visit     Latest known visit with results is:   Appointment on 03/12/2019   Component Date Value Ref Range Status    Cholesterol 03/12/2019 209* 50 - 200 mg/dL Final      Cholesterol:       Desirable         <200 mg/dl       Borderline         200-239 mg/dl       High              >239           Triglycerides 03/12/2019 117  <=150 mg/dL Final      Triglyceride:     Normal          <150 mg/dl     Borderline High 150-199 mg/dl     High            200-499 mg/dl        Very High       >499 mg/dl    Specimen collection should occur prior to N-Acetylcysteine or Metamizole administration due to the potential for falsely depressed results   HDL, Direct 03/12/2019 46  40 - 60 mg/dL Final      HDL Cholesterol:       High    >60 mg/dL       Low     <41 mg/dL  Specimen collection should occur prior to Metamizole administration due to the potential for falsley depressed results   LDL Calculated 03/12/2019 140* 0 - 100 mg/dL Final      LDL Cholesterol:     Optimal           <100 mg/dl     Near Optimal      100-129 mg/dl     Above Optimal       Borderline High 130-159 mg/dl       High            160-189 mg/dl       Very High       >189 mg/dl         This screening LDL is a calculated result  It does not have the accuracy of the Direct Measured LDL in the monitoring of patients with hyperlipidemia and/or statin therapy  Direct Measure LDL (EPE970) must be ordered separately in these patients   Sodium 03/12/2019 137  136 - 145 mmol/L Final    Potassium 03/12/2019 3 8  3 5 - 5 3 mmol/L Final    Chloride 03/12/2019 104  100 - 108 mmol/L Final    CO2 03/12/2019 25  21 - 32 mmol/L Final    ANION GAP 03/12/2019 8  4 - 13 mmol/L Final    BUN 03/12/2019 14  5 - 25 mg/dL Final    Creatinine 03/12/2019 0 80  0 60 - 1 30 mg/dL Final    Standardized to IDMS reference method    Glucose, Fasting 03/12/2019 92  65 - 99 mg/dL Final      Specimen collection should occur prior to Sulfasalazine administration due to the potential for falsely depressed results  Specimen collection should occur prior to Sulfapyridine administration due to the potential for falsely elevated results      Calcium 03/12/2019 9 1  8 3 - 10 1 mg/dL Final    AST 03/12/2019 23  5 - 45 U/L Final      Specimen collection should occur prior to Sulfasalazine administration due to the potential for falsely depressed results   ALT 03/12/2019 44  12 - 78 U/L Final      Specimen collection should occur prior to Sulfasalazine and/or Sulfapyridine administration due to the potential for falsely depressed results   Alkaline Phosphatase 03/12/2019 84  46 - 116 U/L Final    Total Protein 03/12/2019 7 7  6 4 - 8 2 g/dL Final    Albumin 03/12/2019 3 9  3 5 - 5 0 g/dL Final    Total Bilirubin 03/12/2019 0 66  0 20 - 1 00 mg/dL Final    eGFR 03/12/2019 92  ml/min/1 73sq m Final    TSH 3RD GENERATON 03/12/2019 3 560  0 358 - 3 740 uIU/mL Final      Using supplements with high doses of biotin 20 to more than 300 times greater than the adequate daily intake for adults of 30 mcg/day as established by the [place] [employer], can cause falsely depress results      Color, UA 03/12/2019 Yellow   Final    Clarity, UA 03/12/2019 Clear   Final    Specific [place], UA 03/12/2019 1 009  1 003 - 1 030 Final    pH, UA 03/12/2019 6 5  4 5, 5 0, 5 5, 6 0, 6 5, 7 0, 7 5, 8 0 Final    Leukocytes, UA 03/12/2019 Negative  Negative Final    Nitrite, UA 03/12/2019 Negative  Negative Final    Protein, UA 03/12/2019 Negative  Negative mg/dl Final    Glucose, UA 03/12/2019 Negative  Negative mg/dl Final    Ketones, UA 03/12/2019 Negative  Negative mg/dl Final    Urobilinogen, UA 03/12/2019 0 2  0 2, 1 0 E U /dl E U /dl Final    Bilirubin, UA 03/12/2019 Negative  Negative Final    Blood, UA 03/12/2019 Negative  Negative Final

## 2019-03-28 NOTE — TELEPHONE ENCOUNTER
Patient tried to fill his benicar prescription but was told by [facility] that this medication "is not available in this area "    Can you prescribe something else for him instead?

## 2019-04-05 ENCOUNTER — TELEPHONE (OUTPATIENT)
Dept: FAMILY MEDICINE CLINIC | Facility: CLINIC | Age: 69
End: 2019-04-05

## 2019-04-05 DIAGNOSIS — I10 ESSENTIAL HYPERTENSION: Primary | ICD-10-CM

## 2019-04-05 RX ORDER — LOSARTAN POTASSIUM 100 MG/1
100 TABLET ORAL DAILY
Qty: 90 TABLET | Refills: 3 | Status: SHIPPED | OUTPATIENT
Start: 2019-04-05 | End: 2020-02-21

## 2019-07-09 ENCOUNTER — OFFICE VISIT (OUTPATIENT)
Dept: FAMILY MEDICINE CLINIC | Facility: CLINIC | Age: 69
End: 2019-07-09
Payer: MEDICARE

## 2019-07-09 VITALS
HEIGHT: 68 IN | DIASTOLIC BLOOD PRESSURE: 84 MMHG | TEMPERATURE: 97.9 F | SYSTOLIC BLOOD PRESSURE: 140 MMHG | BODY MASS INDEX: 27.65 KG/M2 | HEART RATE: 60 BPM | RESPIRATION RATE: 16 BRPM | WEIGHT: 182.4 LBS

## 2019-07-09 DIAGNOSIS — R05.9 COUGH: ICD-10-CM

## 2019-07-09 DIAGNOSIS — J06.9 ACUTE URI: Primary | ICD-10-CM

## 2019-07-09 PROCEDURE — 99213 OFFICE O/P EST LOW 20 MIN: CPT | Performed by: FAMILY MEDICINE

## 2019-07-09 RX ORDER — PREDNISONE 10 MG/1
TABLET ORAL
Qty: 20 TABLET | Refills: 0 | Status: SHIPPED | OUTPATIENT
Start: 2019-07-09 | End: 2019-09-27 | Stop reason: ALTCHOICE

## 2019-07-09 RX ORDER — PROMETHAZINE HYDROCHLORIDE AND CODEINE PHOSPHATE 6.25; 1 MG/5ML; MG/5ML
5 SYRUP ORAL
Qty: 120 ML | Refills: 0 | Status: SHIPPED | OUTPATIENT
Start: 2019-07-09 | End: 2019-09-27 | Stop reason: ALTCHOICE

## 2019-07-09 NOTE — PROGRESS NOTES
Assessment/Plan:    1  Acute URI  - discussed rest, plenty of fluids, start Prednisone taper, take Mucinex during the day and Promethazine-codeine cough syrup at bedtime  - predniSONE 10 mg tablet; Take 4 tablets daily with food for 2 days, 3 tablets daily for 2 days, 2 tablets daily for 2 days, 1 tablet daily for 2 days  Dispense: 20 tablet; Refill: 0  - promethazine-codeine (PHENERGAN WITH CODEINE) 6 25-10 mg/5 mL syrup; Take 5 mL by mouth daily at bedtime as needed for cough  Dispense: 120 mL; Refill: 0  - Peak flow    Follow up if symptoms persist or worsen  Possible side effects of new medications were reviewed with the patient today  The treatment plan was reviewed with the patient  The patient understands and agrees with the treatment plan      Subjective:   Chief Complaint   Patient presents with    Cough     Times 4 days    Earache     B/L      Patient ID: Tete Spear is a 80 y o  male who presents today with complaining of nasal congestion, runny nose, cough, fatigue and sinus pressure onset 4 days ago, he is now having clogged ears and his cough gets worse at night, he had to sleep in recliner chair last night due to cough  No fever or chills, no purulent mucus production, no pleuritic chest pain, he has been taking OTC cough medications with minimal relief of his cough         The following portions of the patient's history were reviewed and updated as appropriate: allergies, current medications, past family history, past medical history, past social history, past surgical history and problem list     Past Medical History:   Diagnosis Date    Adenomatous colon polyp     Aortic regurgitation     Aortic stenosis     Diverticulosis     Heart murmur     Hemorrhoids     Last Assessed: 11/10/17    Hyperlipidemia     Hypertension     Serrated adenoma of colon     Visual disturbances     Vitamin D deficiency      Past Surgical History:   Procedure Laterality Date    COLONOSCOPY      COLONOSCOPY N/A 11/28/2017    Procedure: COLONOSCOPY with polypectomy, random bx, and hemorrhoid banding;  Surgeon: Silver Carlson MD;  Location: [facility]; Service: General    COLONOSCOPY N/A 2/26/2018    Procedure: COLONOSCOPY;  Surgeon: Rc Donis MD;  Location: [facility]; Service: Gastroenterology    MOUTH SURGERY      [place] COLONOSCOPY FLX DX W/COLLJ [facility] REHABILITATION WHEN PFRMD N/A 11/29/2016    Procedure: COLONOSCOPY POSSIBLE BANDING;  Surgeon: Silver Carlson MD;  Location: [facility]; Service: General     Family History   Problem Relation Age of Onset    Asthma Mother     Heart failure Mother         Congestive    Depression Mother     Alcohol abuse Father     Stroke Father         CVA    Thyroid disease Sister     Heart attack Brother         Myocardial Infarction    Depression Brother     Stroke Family         Syndrome     Social History     Socioeconomic History    Marital status: /Civil Union     Spouse name: Not on file    Number of children: Not on file    Years of education: Not on file    Highest education level: Not on file   Occupational History    Not on file   Social Needs    Financial resource strain: Not on file    Food insecurity:     Worry: Not on file     Inability: Not on file    Transportation needs:     Medical: Not on file     Non-medical: Not on file   Tobacco Use    Smoking status: Never Smoker    Smokeless tobacco: Never Used   Substance and Sexual Activity    Alcohol use: Yes     Comment: 2 beers daily   Per [employer]: minimal usage    Drug use: No    Sexual activity: Not on file   Lifestyle    Physical activity:     Days per week: Not on file     Minutes per session: Not on file    Stress: Not on file   Relationships    Social connections:     Talks on phone: Not on file     Gets together: Not on file     Attends Gnosticism service: Not on file     Active member of club or organization: Not on file     Attends meetings of clubs or organizations: Not on file Relationship status: Not on file    Intimate partner violence:     Fear of current or ex partner: Not on file     Emotionally abused: Not on file     Physically abused: Not on file     Forced sexual activity: Not on file   Other Topics Concern    Not on file   Social History Narrative    Caffeine use: 3 cups coffe per day    Has smoke detectors    Uses safety equipment: seatbelts       Current Outpatient Medications:     aspirin 81 MG tablet, Take 81 mg by mouth daily, Disp: , Rfl:     atorvastatin (LIPITOR) 40 mg tablet, Take 1 tablet (40 mg total) by mouth daily, Disp: 90 tablet, Rfl: 3    Cholecalciferol (VITAMIN D-400 PO), Take by mouth, Disp: , Rfl:     docusate sodium (COLACE) 50 mg capsule, Take by mouth daily, Disp: , Rfl:     losartan (COZAAR) 100 MG tablet, Take 1 tablet (100 mg total) by mouth daily, Disp: 90 tablet, Rfl: 3    Multiple Vitamins-Minerals (CENTRUM SILVER PO), Take by mouth daily, Disp: , Rfl:     Omega-3 Fatty Acids (OMEGA 3 PO), Take by mouth daily  , Disp: , Rfl:     docusate sodium (COLACE) 100 mg capsule, Take 1 capsule by mouth 2 (two) times a day for 30 days, Disp: 60 capsule, Rfl: 0    predniSONE 10 mg tablet, Take 4 tablets daily with food for 2 days, 3 tablets daily for 2 days, 2 tablets daily for 2 days, 1 tablet daily for 2 days, Disp: 20 tablet, Rfl: 0    promethazine-codeine (PHENERGAN WITH CODEINE) 6 25-10 mg/5 mL syrup, Take 5 mL by mouth daily at bedtime as needed for cough, Disp: 120 mL, Rfl: 0    Review of Systems   Constitutional: Positive for fatigue  Negative for chills and fever  HENT: Positive for congestion, postnasal drip, rhinorrhea and sinus pressure  Negative for ear pain, sore throat, trouble swallowing and voice change  Respiratory: Positive for cough and wheezing  Negative for shortness of breath  Cardiovascular: Negative for chest pain and palpitations  Gastrointestinal: Negative for abdominal pain, diarrhea, nausea and vomiting  Neurological: Negative for dizziness and headaches  Hematological: Negative for adenopathy  Objective:    Vitals:    07/09/19 1152   BP: 140/84   BP Location: Left arm   Patient Position: Sitting   Cuff Size: Standard   Pulse: 60   Resp: 16   Temp: 97 9 °F (36 6 °C)   Weight: 82 7 kg (182 lb 6 4 oz)   Height: 5' 7 75" (1 721 m)        Physical Exam   Constitutional: He is oriented to person, place, and time  He appears well-developed and well-nourished  No distress  HENT:   Head: Normocephalic and atraumatic  Right Ear: Tympanic membrane and ear canal normal    Left Ear: Tympanic membrane and ear canal normal    Nose: Mucosal edema present  Mouth/Throat: No oropharyngeal exudate or posterior oropharyngeal erythema  Neck: Neck supple  Cardiovascular: Normal rate, regular rhythm and normal heart sounds  No murmur heard  Pulmonary/Chest: Effort normal and breath sounds normal  No respiratory distress  He has no wheezes  He has no rhonchi  He has no rales  Lymphadenopathy:     He has no cervical adenopathy  Neurological: He is alert and oriented to person, place, and time         Office Visit on 07/09/2019   Component Date Value Ref Range Status    OTHER 07/09/2019    Final    [phone]

## 2019-09-17 ENCOUNTER — APPOINTMENT (OUTPATIENT)
Dept: LAB | Facility: CLINIC | Age: 69
End: 2019-09-17
Payer: MEDICARE

## 2019-09-17 DIAGNOSIS — E03.8 SUBCLINICAL HYPOTHYROIDISM: ICD-10-CM

## 2019-09-17 DIAGNOSIS — E78.5 HYPERLIPIDEMIA, UNSPECIFIED HYPERLIPIDEMIA TYPE: ICD-10-CM

## 2019-09-17 DIAGNOSIS — I10 ESSENTIAL HYPERTENSION: ICD-10-CM

## 2019-09-17 DIAGNOSIS — Z12.5 SCREENING FOR PROSTATE CANCER: ICD-10-CM

## 2019-09-17 LAB
ALBUMIN SERPL BCP-MCNC: 4.3 G/DL (ref 3.5–5)
ALP SERPL-CCNC: 93 U/L (ref 46–116)
ALT SERPL W P-5'-P-CCNC: 36 U/L (ref 12–78)
ANION GAP SERPL CALCULATED.3IONS-SCNC: 6 MMOL/L (ref 4–13)
AST SERPL W P-5'-P-CCNC: 19 U/L (ref 5–45)
BILIRUB SERPL-MCNC: 0.68 MG/DL (ref 0.2–1)
BUN SERPL-MCNC: 12 MG/DL (ref 5–25)
CALCIUM SERPL-MCNC: 9.4 MG/DL (ref 8.3–10.1)
CHLORIDE SERPL-SCNC: 108 MMOL/L (ref 100–108)
CHOLEST SERPL-MCNC: 208 MG/DL (ref 50–200)
CO2 SERPL-SCNC: 26 MMOL/L (ref 21–32)
CREAT SERPL-MCNC: 0.75 MG/DL (ref 0.6–1.3)
GFR SERPL CREATININE-BSD FRML MDRD: 95 ML/MIN/1.73SQ M
GLUCOSE P FAST SERPL-MCNC: 93 MG/DL (ref 65–99)
HDLC SERPL-MCNC: 44 MG/DL (ref 40–60)
LDLC SERPL CALC-MCNC: 131 MG/DL (ref 0–100)
POTASSIUM SERPL-SCNC: 4.4 MMOL/L (ref 3.5–5.3)
PROT SERPL-MCNC: 7.7 G/DL (ref 6.4–8.2)
PSA SERPL-MCNC: 2.6 NG/ML (ref 0–4)
SODIUM SERPL-SCNC: 140 MMOL/L (ref 136–145)
T4 FREE SERPL-MCNC: 0.78 NG/DL (ref 0.76–1.46)
TRIGL SERPL-MCNC: 166 MG/DL
TSH SERPL DL<=0.05 MIU/L-ACNC: 3.76 UIU/ML (ref 0.36–3.74)

## 2019-09-17 PROCEDURE — 80053 COMPREHEN METABOLIC PANEL: CPT

## 2019-09-17 PROCEDURE — 84439 ASSAY OF FREE THYROXINE: CPT

## 2019-09-17 PROCEDURE — 84443 ASSAY THYROID STIM HORMONE: CPT

## 2019-09-17 PROCEDURE — G0103 PSA SCREENING: HCPCS

## 2019-09-17 PROCEDURE — 80061 LIPID PANEL: CPT

## 2019-09-17 PROCEDURE — 36415 COLL VENOUS BLD VENIPUNCTURE: CPT

## 2019-09-27 ENCOUNTER — OFFICE VISIT (OUTPATIENT)
Dept: FAMILY MEDICINE CLINIC | Facility: CLINIC | Age: 69
End: 2019-09-27
Payer: MEDICARE

## 2019-09-27 VITALS
RESPIRATION RATE: 16 BRPM | DIASTOLIC BLOOD PRESSURE: 78 MMHG | SYSTOLIC BLOOD PRESSURE: 140 MMHG | BODY MASS INDEX: 27.55 KG/M2 | HEIGHT: 68 IN | WEIGHT: 181.8 LBS | HEART RATE: 70 BPM

## 2019-09-27 DIAGNOSIS — E03.8 SUBCLINICAL HYPOTHYROIDISM: ICD-10-CM

## 2019-09-27 DIAGNOSIS — Z23 NEED FOR PROPHYLACTIC VACCINATION AND INOCULATION AGAINST INFLUENZA: ICD-10-CM

## 2019-09-27 DIAGNOSIS — E78.5 HYPERLIPIDEMIA, UNSPECIFIED HYPERLIPIDEMIA TYPE: ICD-10-CM

## 2019-09-27 DIAGNOSIS — I35.1 NONRHEUMATIC AORTIC VALVE INSUFFICIENCY: ICD-10-CM

## 2019-09-27 DIAGNOSIS — I10 ESSENTIAL HYPERTENSION: Primary | ICD-10-CM

## 2019-09-27 PROCEDURE — G0008 ADMIN INFLUENZA VIRUS VAC: HCPCS

## 2019-09-27 PROCEDURE — 99214 OFFICE O/P EST MOD 30 MIN: CPT | Performed by: FAMILY MEDICINE

## 2019-09-27 PROCEDURE — 90662 IIV NO PRSV INCREASED AG IM: CPT

## 2019-09-27 RX ORDER — ATORVASTATIN CALCIUM 40 MG/1
40 TABLET, FILM COATED ORAL DAILY
Qty: 90 TABLET | Refills: 3 | Status: SHIPPED | OUTPATIENT
Start: 2019-09-27 | End: 2020-10-24

## 2019-10-29 ENCOUNTER — HOSPITAL ENCOUNTER (OUTPATIENT)
Dept: NON INVASIVE DIAGNOSTICS | Facility: CLINIC | Age: 69
Discharge: HOME | End: 2019-10-29
Payer: MEDICARE

## 2019-10-29 DIAGNOSIS — I35.0 AORTIC VALVE STENOSIS, ETIOLOGY OF CARDIAC VALVE DISEASE UNSPECIFIED: ICD-10-CM

## 2019-10-29 PROCEDURE — 93306 TTE W/DOPPLER COMPLETE: CPT | Performed by: INTERNAL MEDICINE

## 2019-10-29 PROCEDURE — 93306 TTE W/DOPPLER COMPLETE: CPT

## 2019-11-08 ENCOUNTER — OFFICE VISIT (OUTPATIENT)
Dept: FAMILY MEDICINE CLINIC | Facility: CLINIC | Age: 69
End: 2019-11-08
Payer: MEDICARE

## 2019-11-08 VITALS
HEIGHT: 68 IN | WEIGHT: 186.4 LBS | SYSTOLIC BLOOD PRESSURE: 140 MMHG | RESPIRATION RATE: 16 BRPM | DIASTOLIC BLOOD PRESSURE: 82 MMHG | TEMPERATURE: 96.8 F | HEART RATE: 70 BPM | BODY MASS INDEX: 28.25 KG/M2
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
## 2019-11-08 DIAGNOSIS — S40.862A TICK BITE OF LEFT UPPER ARM, INITIAL ENCOUNTER: Primary | ICD-10-CM

## 2019-11-08 PROCEDURE — 99213 OFFICE O/P EST LOW 20 MIN: CPT | Performed by: FAMILY MEDICINE

## 2019-11-08 RX ORDER — DOXYCYCLINE HYCLATE 100 MG/1
100 CAPSULE ORAL 2 TIMES DAILY
Qty: 2 CAPSULE | Refills: 0 | Status: SHIPPED | OUTPATIENT
Start: 2019-11-08 | End: 2019-11-09

## 2019-11-08 NOTE — PROGRESS NOTES
Assessment/Plan:    1  Tick bite of left upper arm, initial encounter  - start Doxycycline 100 mg x 2 doses, s/s of Lyme disease discussed, script given for Lyme titers to be done in 2-3 weeks  - doxycycline hyclate (VIBRAMYCIN) 100 mg capsule; Take 1 capsule (100 mg total) by mouth 2 (two) times a day for 2 doses  Dispense: 2 capsule; Refill: 0  - Lyme Antibody Profile with reflex to WB; Future       Follow up as previously scheduled or sooner if needed  Possible side effects of new medications were reviewed with the patient today  The treatment plan was reviewed with the patient  The patient understands and agrees with the treatment plan        Subjective:   Chief Complaint   Patient presents with    Rash     pt states he found a tick bite on his L upper arm       Patient ID: Susan Huerta is a 80 y o  male here today found a tick on his left upper arm this am, his wife removed it without difficulty, the tick was still alive and moving  Patient reports slight redness at the site, no fever or chills, no headache, no numbness, no chest pain, no joint or muscle aches  The following portions of the patient's history were reviewed and updated as appropriate: allergies, current medications, past family history, past medical history, past social history, past surgical history and problem list     Past Medical History:   Diagnosis Date    Adenomatous colon polyp     Aortic regurgitation     Aortic stenosis     Diverticulosis     Heart murmur     Hemorrhoids     Last Assessed: 11/10/17    Hyperlipidemia     Hypertension     Serrated adenoma of colon     Visual disturbances     Vitamin D deficiency      Past Surgical History:   Procedure Laterality Date    COLONOSCOPY      COLONOSCOPY N/A 11/28/2017    Procedure: COLONOSCOPY with polypectomy, random bx, and hemorrhoid banding;  Surgeon: Sherie Sanchez MD;  Location: [facility];   Service: General    COLONOSCOPY N/A 2/26/2018    Procedure: COLONOSCOPY;  Surgeon: Ken Way MD;  Location: [facility]; Service: Gastroenterology    MOUTH SURGERY      [place] COLONOSCOPY FLX DX W/COLLJ [facility] WHEN PFRMD N/A 11/29/2016    Procedure: COLONOSCOPY POSSIBLE BANDING;  Surgeon: Charley Bustos MD;  Location: [facility]; Service: General     Family History   Problem Relation Age of Onset    Asthma Mother     Heart failure Mother         Congestive    Depression Mother     Alcohol abuse Father     Stroke Father         CVA    Thyroid disease Sister     Heart attack Brother         Myocardial Infarction    Depression Brother     Stroke Family         Syndrome     Social History     Socioeconomic History    Marital status: /Civil Union     Spouse name: Not on file    Number of children: Not on file    Years of education: Not on file    Highest education level: Not on file   Occupational History    Not on file   Social Needs    Financial resource strain: Not on file    Food insecurity:     Worry: Not on file     Inability: Not on file    Transportation needs:     Medical: Not on file     Non-medical: Not on file   Tobacco Use    Smoking status: Never Smoker    Smokeless tobacco: Never Used   Substance and Sexual Activity    Alcohol use: Yes     Comment: 2 beers daily   Per Allscripts: minimal usage    Drug use: No    Sexual activity: Not on file   Lifestyle    Physical activity:     Days per week: Not on file     Minutes per session: Not on file    Stress: Not on file   Relationships    Social connections:     Talks on phone: Not on file     Gets together: Not on file     Attends Christianity service: Not on file     Active member of club or organization: Not on file     Attends meetings of clubs or organizations: Not on file     Relationship status: Not on file    Intimate partner violence:     Fear of current or ex partner: Not on file     Emotionally abused: Not on file     Physically abused: Not on file     Forced sexual activity: Not on file   Other Topics Concern    Not on file   Social History Narrative    Caffeine use: 3 cups coffe per day    Has smoke detectors    Uses safety equipment: seatbelts       Current Outpatient Medications:     aspirin 81 MG tablet, Take 81 mg by mouth daily, Disp: , Rfl:     atorvastatin (LIPITOR) 40 mg tablet, Take 1 tablet (40 mg total) by mouth daily, Disp: 90 tablet, Rfl: 3    Cholecalciferol (VITAMIN D-400 PO), Take by mouth, Disp: , Rfl:     docusate sodium (COLACE) 50 mg capsule, Take by mouth daily, Disp: , Rfl:     losartan (COZAAR) 100 MG tablet, Take 1 tablet (100 mg total) by mouth daily, Disp: 90 tablet, Rfl: 3    Multiple Vitamins-Minerals (CENTRUM SILVER PO), Take by mouth daily, Disp: , Rfl:     Omega-3 Fatty Acids (OMEGA 3 PO), Take by mouth daily  , Disp: , Rfl:     doxycycline hyclate (VIBRAMYCIN) 100 mg capsule, Take 1 capsule (100 mg total) by mouth 2 (two) times a day for 2 doses, Disp: 2 capsule, Rfl: 0    Review of Systems   Constitutional: Negative for chills, fatigue and fever  Respiratory: Negative for shortness of breath and wheezing  Cardiovascular: Negative for chest pain, palpitations and leg swelling  Musculoskeletal: Negative for arthralgias and myalgias  Skin:        Tick bite left upper arm   Neurological: Negative for dizziness, weakness, numbness and headaches  Objective:    Vitals:    11/08/19 1045   BP: 140/82   BP Location: Left arm   Patient Position: Sitting   Cuff Size: Standard   Pulse: 70   Resp: 16   Temp: (!) 96 8 °F (36 °C)   Weight: 84 6 kg (186 lb 6 4 oz)   Height: 5' 8" (1 727 m)        Physical Exam   Constitutional: He appears well-developed and well-nourished  No distress  Cardiovascular: Normal rate, regular rhythm and normal heart sounds     Pulmonary/Chest: Effort normal and breath sounds normal    Skin:   Left upper bite with small erythematous reena around tick bite site, no remaining tick parts noted

## 2019-11-08 NOTE — PATIENT INSTRUCTIONS
Lyme Disease   WHAT YOU NEED TO KNOW:   What is Lyme disease? Lyme disease is a bacterial infection caused by the bite of an infected tick  What increases my risk for Lyme disease? · You work in a wooded area or area with heavy brush    · You travel to or live in areas where ticks are common    · You hunt, camp, or fish in a wooded area  What are the signs and symptoms of Lyme disease? · A red rash that looks like a target or bull's eye    · Fever, chills, or sore throat    · Weakness and tiredness    · Headache or muscle aches    · Joint pain    · Abdominal pain, nausea, or diarrhea  How is Lyme disease diagnosed? Your healthcare provider will examine you and ask about your symptoms  Tell him if you live or work in a wooded area or have been hunting or camping  You may need any of the following:  · Blood tests  may show the bacteria that cause Lyme disease  · A sample of joint fluid  may be tested for the bacteria that cause Lyme disease  How is Lyme disease treated? You may need any of the following:  · Antibiotics  treat a bacterial infection  · NSAIDs , such as ibuprofen, help decrease swelling, pain, and fever  This medicine is available with or without a doctor's order  NSAIDs can cause stomach bleeding or kidney problems in certain people  If you take blood thinner medicine, always ask your healthcare provider if NSAIDs are safe for you  Always read the medicine label and follow directions  How can I prevent a tick bite? Ticks live in areas covered by brush and grass  They may even be found in your lawn if you live in certain areas  Outdoor pets can carry ticks inside the house  Ticks can grab onto you or your clothes when you walk by grass or brush  If you go into areas that contain many trees, tall grasses, and underbrush, do the following:  · Wear light colored pants and a long-sleeved shirt  Tuck your pants into your socks or boots  Tuck in your shirt   Wear sleeves that fit close to the skin at your wrists and neck  This will help prevent ticks from crawling through gaps in your clothing and onto your skin  Wear a hat in areas with trees  · Apply insect repellant on your skin  The insect repellant should contain DEET  Do not put insect repellant on skin that is cut, scratched, or irritated  Always use soap and water to wash the insect repellant off as soon as possible once you are indoors  Do not apply insect repellant on your child's face or hands  · Spray insect repellant onto your clothes  Use permethrin spray  This spray kills ticks that crawl on your clothing  Be sure to spray the tops of your boots, bottom of pant legs, and sleeve cuffs  As soon as possible, wash and dry clothing in hot water and high heat  · Check your and your child's clothing, hair, and skin for ticks  Shower within 2 hours of coming indoors  Carefully check the hairline, armpits, neck, and waist      · Decrease the risk for ticks in your yard  Ticks like to live in shady, moist areas  [place] your lawn regularly to keep the grass short  Trim the grass around birdbaths and fences  Cut branches that are overgrown and take them out of the yard  Clear out leaf piles  [place] in a dry, vanesa area  · Treat pets with tick control products  as directed  This will decrease your risk for a tick bite  Check your pets for ticks  Remove ticks from pets the same way as you remove them from people  Ask your pet's  about the best product to use on your pet  · Remove a tick with tweezers  Wear gloves  Grasp the tick as close to your skin as possible  Pull the tick straight up and out  Do not touch the tick with your bare hands  Check to make sure you removed the whole tick, including the head  Clean the area with soap and water or rubbing alcohol  Then wash your hands with soap and water  Where can I find more information?    · Centers for Disease Control and Prevention (CDC)  [address] , [address]  Phone: 1- 85 910 493  Web Address: Juanpablo louise  Call 44 [phone] for any of the following:   · Your heart is beating faster than usual and you feel dizzy  · You have chest pain or trouble breathing  · You suddenly cannot talk or see well, or you have trouble moving an area of your body  When should I seek immediate care? · You have a headache and a stiff neck  · You have trouble concentrating or thinking clearly  · You have numbness or tingling in your arms or legs, or you have trouble walking  When should I contact my healthcare provider? · Your rash grows or spreads to other areas of your body  · You suddenly have trouble falling or staying asleep  · You have new or worsening pain and swelling in your joints  · You have new or worsening weakness and muscle pain  · You have a new tick bite  · You have questions or concerns about your condition or care

## 2019-11-20 ENCOUNTER — OFFICE VISIT (OUTPATIENT)
Dept: CARDIOLOGY CLINIC | Facility: CLINIC | Age: 69
End: 2019-11-20
Payer: MEDICARE

## 2019-11-20 VITALS
WEIGHT: 183.5 LBS | DIASTOLIC BLOOD PRESSURE: 80 MMHG | SYSTOLIC BLOOD PRESSURE: 138 MMHG | HEART RATE: 62 BPM | BODY MASS INDEX: 27.81 KG/M2 | HEIGHT: 68 IN

## 2019-11-20 DIAGNOSIS — E78.00 PURE HYPERCHOLESTEROLEMIA: ICD-10-CM

## 2019-11-20 DIAGNOSIS — I35.1 NONRHEUMATIC AORTIC VALVE INSUFFICIENCY: Primary | ICD-10-CM

## 2019-11-20 DIAGNOSIS — Q23.1 BICUSPID AORTIC VALVE: ICD-10-CM

## 2019-11-20 DIAGNOSIS — I10 ESSENTIAL HYPERTENSION: ICD-10-CM

## 2019-11-20 PROCEDURE — 99214 OFFICE O/P EST MOD 30 MIN: CPT | Performed by: INTERNAL MEDICINE

## 2019-11-20 NOTE — PROGRESS NOTES
Cardiology Follow Up Visit    Adis Keenan  1950  [id number]  [address] [facility]    1  Nonrheumatic aortic valve insufficiency  Echo complete with contrast if indicated   2  Essential hypertension  Echo complete with contrast if indicated   3  Bicuspid aortic valve  Echo complete with contrast if indicated   4  Pure hypercholesterolemia           Discussion/Summary:    1  History of bicuspid aortic valve with 1 to 2+ aortic valve regurgitation, no interval change echocardiogram this year  No significant aortic valve stenosis    2  Essential hypertension, controlled    3  Mild ascending aortic root upper normal/mildly dilated, unchanged    4  Hyperlipidemia, on atorvastatin 40 mg daily    Plan:  Patient continues to do well  Echocardiogram with next year's visit in continue diet and daily exercise  Avoid greater than 50 lb of heavy lifting  Blood pressure is well controlled  Continue statin at current dose  Interval History:    Patient presents routine follow-up history of bicuspid aortic valve in mild-to-moderate valve regurgitation  Originally came 2016 after episode of vision changes by his ophthalmologist     Yanna Brown no definitive cause  Holter monitoring and echocardiogram performed  No atrial fibrillation  Echocardiogram with bicuspid aortic valve in 1 to 2+ aortic valve regurgitation and aortic root dimension 38-40mm  Patient feels absolutely fine  Walks daily  No symptoms of shortness of breath, PND orthopnea or chest discomfort  Takes primary prevention atorvastatin 40 mg with LDL in the 120-130 range      Blood pressure well controlled on angiotensin receptor blocker          Patient Active Problem List   Diagnosis    Serrated adenoma of colon    Aortic stenosis    Aortic regurgitation    Pure hypercholesterolemia    Essential hypertension    Bicuspid aortic valve     Past Medical History: Diagnosis Date    Adenomatous colon polyp     Aortic regurgitation     Aortic stenosis     Diverticulosis     Heart murmur     Hemorrhoids     Last Assessed: 11/10/17    Hyperlipidemia     Hypertension     Serrated adenoma of colon     Visual disturbances     Vitamin D deficiency      Social History     Socioeconomic History    Marital status: /Civil Union     Spouse name: Not on file    Number of children: Not on file    Years of education: Not on file    Highest education level: Not on file   Occupational History    Not on file   Social Needs    Financial resource strain: Not on file    Food insecurity:     Worry: Not on file     Inability: Not on file    Transportation needs:     Medical: Not on file     Non-medical: Not on file   Tobacco Use    Smoking status: Never Smoker    Smokeless tobacco: Never Used   Substance and Sexual Activity    Alcohol use: Yes     Comment: 2 beers daily   Per Allscripts: minimal usage    Drug use: No    Sexual activity: Not on file   Lifestyle    Physical activity:     Days per week: Not on file     Minutes per session: Not on file    Stress: Not on file   Relationships    Social connections:     Talks on phone: Not on file     Gets together: Not on file     Attends Gnosticism service: Not on file     Active member of club or organization: Not on file     Attends meetings of clubs or organizations: Not on file     Relationship status: Not on file    Intimate partner violence:     Fear of current or ex partner: Not on file     Emotionally abused: Not on file     Physically abused: Not on file     Forced sexual activity: Not on file   Other Topics Concern    Not on file   Social History Narrative    Caffeine use: 3 cups coffe per day    Has smoke detectors    Uses safety equipment: seatbelts      Family History   Problem Relation Age of Onset    Asthma Mother     Heart failure Mother         Congestive    Depression Mother     Alcohol abuse Father     Stroke Father         CVA    Thyroid disease Sister     Heart attack Brother         Myocardial Infarction    Depression Brother     Stroke Family         Syndrome     Past Surgical History:   Procedure Laterality Date    COLONOSCOPY      COLONOSCOPY N/A 11/28/2017    Procedure: COLONOSCOPY with polypectomy, random bx, and hemorrhoid banding;  Surgeon: Kobe Lockhart MD;  Location: [facility]; Service: General    COLONOSCOPY N/A 2/26/2018    Procedure: COLONOSCOPY;  Surgeon: Brody Gonsalez MD;  Location: [facility]; Service: Gastroenterology    MOUTH SURGERY      [place] COLONOSCOPY FLX DX W/COLLJ [facility] WHEN PFRMD N/A 11/29/2016    Procedure: COLONOSCOPY POSSIBLE BANDING;  Surgeon: Kobe Lockhart MD;  Location: [facility];   Service: General       Current Outpatient Medications:     aspirin 81 MG tablet, Take 81 mg by mouth daily, Disp: , Rfl:     atorvastatin (LIPITOR) 40 mg tablet, Take 1 tablet (40 mg total) by mouth daily, Disp: 90 tablet, Rfl: 3    Cholecalciferol (VITAMIN D-400 PO), Take by mouth, Disp: , Rfl:     docusate sodium (COLACE) 50 mg capsule, Take by mouth daily, Disp: , Rfl:     losartan (COZAAR) 100 MG tablet, Take 1 tablet (100 mg total) by mouth daily, Disp: 90 tablet, Rfl: 3    Multiple Vitamins-Minerals (CENTRUM SILVER PO), Take by mouth daily, Disp: , Rfl:     Omega-3 Fatty Acids (OMEGA 3 PO), Take by mouth daily  , Disp: , Rfl:   Allergies   Allergen Reactions    Sulfa Antibiotics Rash     redness         Social, Family, Medication history reviewed and updated as necessary      Labs:     Lab Results   Component Value Date     09/01/2015    K 4 4 09/17/2019     09/17/2019    CO2 26 09/17/2019    BUN 12 09/17/2019    CREATININE 0 75 09/17/2019    CREATININE 0 80 03/12/2019    GLUCOSE 94 09/01/2015    CALCIUM 9 4 09/17/2019       Lab Results   Component Value Date    WBC 5 71 09/10/2018    HGB 15 7 09/10/2018    HGB 14 9 09/11/2017    HCT 46 7 09/10/2018 HCT 43 0 09/11/2017     09/10/2018     09/11/2017       Lab Results   Component Value Date    CHOL 196 09/01/2015    CHOL 224 02/27/2015     Lab Results   Component Value Date    HDL 44 09/17/2019    HDL 46 03/12/2019     Lab Results   Component Value Date    LDLCALC 131 (H) 09/17/2019    LDLCALC 140 (H) 03/12/2019     No results found for: LDLDIRECT          Lab Results   Component Value Date    TRIG 166 (H) 09/17/2019    TRIG 117 03/12/2019       Lab Results   Component Value Date    ALT 36 09/17/2019    ALT 44 03/12/2019    AST 19 09/17/2019    AST 23 03/12/2019       No results found for: INR    No results found for: NTBNP    No results found for: HGBA1C        Imaging: Reviewed in epic        Review of Systems:  14 systems reviewed and negative with exception of the above        PHYSICAL EXAM:      Vitals:    11/20/19 1558   BP: 138/80   Pulse: 62     Body mass index is 27 9 kg/m²  Weight (last 2 days)     Date/Time   Weight    11/20/19 1558   83 2 (183 5)                Gen: No acute distress  HEENT: anicteric, mucous membranes moist  Neck: supple, no jugular venous distention, or carotid bruit  Heart: regular, normal s1 and s2, 1/6 diastolic murmur  Lungs :clear to auscultation bilaterally, no rales/rhonchi or wheeze  Abdomen: soft nontender, normoactive bowel sounds, no organomegaly  Ext: warm and perfused, normal femoral pulses, no edema, or clubbing  Skin: warm, no rashes  Neuro: AAO x 3, no focal findings  Psychiatric: normal affect  Musculoskeletal: no obvious joint deformities

## 2019-11-22 ENCOUNTER — LAB (OUTPATIENT)
Dept: LAB | Facility: CLINIC | Age: 69
End: 2019-11-22
Payer: MEDICARE

## 2019-11-22 DIAGNOSIS — S40.862A TICK BITE OF LEFT UPPER ARM, INITIAL ENCOUNTER: ICD-10-CM
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
## 2019-11-22 PROCEDURE — 36415 COLL VENOUS BLD VENIPUNCTURE: CPT

## 2019-11-22 PROCEDURE — 86618 LYME DISEASE ANTIBODY: CPT

## 2019-11-23 LAB — B BURGDOR IGG+IGM SER-ACNC: <0.91 ISR (ref 0–0.9)

## 2019-11-25 ENCOUNTER — TELEPHONE (OUTPATIENT)
Dept: FAMILY MEDICINE CLINIC | Facility: CLINIC | Age: 69
End: 2019-11-25

## 2019-11-25 NOTE — TELEPHONE ENCOUNTER
----- Message from Mayra Bates MD sent at 11/24/2019  6:55 PM EST -----  Please inform patient his Lyme test was negative

## 2020-02-21 DIAGNOSIS — I10 ESSENTIAL HYPERTENSION: ICD-10-CM

## 2020-02-21 RX ORDER — LOSARTAN POTASSIUM 100 MG/1
TABLET ORAL
Qty: 90 TABLET | Refills: 4 | Status: SHIPPED | OUTPATIENT
Start: 2020-02-21 | End: 2021-05-18

## 2020-05-12 ENCOUNTER — APPOINTMENT (OUTPATIENT)
Dept: LAB | Facility: HOSPITAL | Age: 70
End: 2020-05-12
Payer: MEDICARE

## 2020-05-12 DIAGNOSIS — E03.8 SUBCLINICAL HYPOTHYROIDISM: ICD-10-CM

## 2020-05-12 DIAGNOSIS — I10 ESSENTIAL HYPERTENSION: ICD-10-CM

## 2020-05-12 DIAGNOSIS — E78.5 HYPERLIPIDEMIA, UNSPECIFIED HYPERLIPIDEMIA TYPE: ICD-10-CM

## 2020-05-12 LAB
ALBUMIN SERPL BCP-MCNC: 4 G/DL (ref 3.5–5)
ALP SERPL-CCNC: 95 U/L (ref 46–116)
ALT SERPL W P-5'-P-CCNC: 47 U/L (ref 12–78)
ANION GAP SERPL CALCULATED.3IONS-SCNC: 4 MMOL/L (ref 4–13)
AST SERPL W P-5'-P-CCNC: 65 U/L (ref 5–45)
BILIRUB SERPL-MCNC: 0.8 MG/DL (ref 0.2–1)
BILIRUB UR QL STRIP: NEGATIVE
BUN SERPL-MCNC: 11 MG/DL (ref 5–25)
CALCIUM SERPL-MCNC: 9.8 MG/DL (ref 8.3–10.1)
CHLORIDE SERPL-SCNC: 106 MMOL/L (ref 100–108)
CHOLEST SERPL-MCNC: 199 MG/DL (ref 50–200)
CLARITY UR: CLEAR
CO2 SERPL-SCNC: 27 MMOL/L (ref 21–32)
COLOR UR: YELLOW
CREAT SERPL-MCNC: 0.86 MG/DL (ref 0.6–1.3)
GFR SERPL CREATININE-BSD FRML MDRD: 89 ML/MIN/1.73SQ M
GLUCOSE P FAST SERPL-MCNC: 100 MG/DL (ref 65–99)
GLUCOSE UR STRIP-MCNC: NEGATIVE MG/DL
HDLC SERPL-MCNC: 45 MG/DL
HGB UR QL STRIP.AUTO: NEGATIVE
KETONES UR STRIP-MCNC: NEGATIVE MG/DL
LDLC SERPL CALC-MCNC: 116 MG/DL (ref 0–100)
LEUKOCYTE ESTERASE UR QL STRIP: NEGATIVE
NITRITE UR QL STRIP: NEGATIVE
PH UR STRIP.AUTO: 6 [PH]
POTASSIUM SERPL-SCNC: 5.8 MMOL/L (ref 3.5–5.3)
PROT SERPL-MCNC: 8.3 G/DL (ref 6.4–8.2)
PROT UR STRIP-MCNC: NEGATIVE MG/DL
SODIUM SERPL-SCNC: 137 MMOL/L (ref 136–145)
SP GR UR STRIP.AUTO: 1 (ref 1–1.03)
T4 FREE SERPL-MCNC: 0.85 NG/DL (ref 0.76–1.46)
TRIGL SERPL-MCNC: 192 MG/DL
TSH SERPL DL<=0.05 MIU/L-ACNC: 5.71 UIU/ML (ref 0.36–3.74)
UROBILINOGEN UR QL STRIP.AUTO: 0.2 E.U./DL

## 2020-05-12 PROCEDURE — 80053 COMPREHEN METABOLIC PANEL: CPT

## 2020-05-12 PROCEDURE — 81003 URINALYSIS AUTO W/O SCOPE: CPT

## 2020-05-12 PROCEDURE — 36415 COLL VENOUS BLD VENIPUNCTURE: CPT

## 2020-05-12 PROCEDURE — 84443 ASSAY THYROID STIM HORMONE: CPT

## 2020-05-12 PROCEDURE — 80061 LIPID PANEL: CPT

## 2020-05-12 PROCEDURE — 84439 ASSAY OF FREE THYROXINE: CPT

## 2020-05-13 DIAGNOSIS — E87.5 HYPERKALEMIA: Primary | ICD-10-CM

## 2020-05-13 DIAGNOSIS — R74.01 ELEVATED AST (SGOT): ICD-10-CM

## 2020-05-14 ENCOUNTER — TELEPHONE (OUTPATIENT)
Dept: FAMILY MEDICINE CLINIC | Facility: CLINIC | Age: 70
End: 2020-05-14

## 2020-05-14 ENCOUNTER — LAB (OUTPATIENT)
Dept: LAB | Facility: HOSPITAL | Age: 70
End: 2020-05-14
Payer: MEDICARE

## 2020-05-14 DIAGNOSIS — R74.01 ELEVATED AST (SGOT): ICD-10-CM

## 2020-05-14 DIAGNOSIS — E87.5 HYPERKALEMIA: ICD-10-CM

## 2020-05-14 LAB
ALBUMIN SERPL BCP-MCNC: 3.9 G/DL (ref 3.5–5)
ALP SERPL-CCNC: 93 U/L (ref 46–116)
ALT SERPL W P-5'-P-CCNC: 48 U/L (ref 12–78)
ANION GAP SERPL CALCULATED.3IONS-SCNC: 4 MMOL/L (ref 4–13)
AST SERPL W P-5'-P-CCNC: 24 U/L (ref 5–45)
BILIRUB SERPL-MCNC: 0.62 MG/DL (ref 0.2–1)
BUN SERPL-MCNC: 15 MG/DL (ref 5–25)
CALCIUM SERPL-MCNC: 10 MG/DL (ref 8.3–10.1)
CHLORIDE SERPL-SCNC: 107 MMOL/L (ref 100–108)
CO2 SERPL-SCNC: 26 MMOL/L (ref 21–32)
CREAT SERPL-MCNC: 0.81 MG/DL (ref 0.6–1.3)
GFR SERPL CREATININE-BSD FRML MDRD: 91 ML/MIN/1.73SQ M
GLUCOSE P FAST SERPL-MCNC: 104 MG/DL (ref 65–99)
POTASSIUM SERPL-SCNC: 4.1 MMOL/L (ref 3.5–5.3)
PROT SERPL-MCNC: 7.7 G/DL (ref 6.4–8.2)
SODIUM SERPL-SCNC: 137 MMOL/L (ref 136–145)

## 2020-05-14 PROCEDURE — 80053 COMPREHEN METABOLIC PANEL: CPT

## 2020-05-14 PROCEDURE — 36415 COLL VENOUS BLD VENIPUNCTURE: CPT

## 2020-05-18 ENCOUNTER — OFFICE VISIT (OUTPATIENT)
Dept: FAMILY MEDICINE CLINIC | Facility: CLINIC | Age: 70
End: 2020-05-18
Payer: MEDICARE

## 2020-05-18 VITALS
RESPIRATION RATE: 16 BRPM | WEIGHT: 181.4 LBS | BODY MASS INDEX: 27.49 KG/M2 | HEIGHT: 68 IN | DIASTOLIC BLOOD PRESSURE: 82 MMHG | TEMPERATURE: 98.2 F | HEART RATE: 56 BPM | SYSTOLIC BLOOD PRESSURE: 136 MMHG

## 2020-05-18 DIAGNOSIS — I10 ESSENTIAL HYPERTENSION: ICD-10-CM

## 2020-05-18 DIAGNOSIS — Z12.5 SCREENING FOR PROSTATE CANCER: ICD-10-CM

## 2020-05-18 DIAGNOSIS — E78.5 HYPERLIPIDEMIA, UNSPECIFIED HYPERLIPIDEMIA TYPE: ICD-10-CM

## 2020-05-18 DIAGNOSIS — Z00.00 MEDICARE ANNUAL WELLNESS VISIT, SUBSEQUENT: Primary | ICD-10-CM

## 2020-05-18 DIAGNOSIS — I35.1 NONRHEUMATIC AORTIC VALVE INSUFFICIENCY: ICD-10-CM

## 2020-05-18 DIAGNOSIS — E03.8 SUBCLINICAL HYPOTHYROIDISM: ICD-10-CM

## 2020-05-18 PROCEDURE — 1160F RVW MEDS BY RX/DR IN RCRD: CPT | Performed by: FAMILY MEDICINE

## 2020-05-18 PROCEDURE — 3075F SYST BP GE 130 - 139MM HG: CPT | Performed by: FAMILY MEDICINE

## 2020-05-18 PROCEDURE — 1125F AMNT PAIN NOTED PAIN PRSNT: CPT | Performed by: FAMILY MEDICINE

## 2020-05-18 PROCEDURE — 4040F PNEUMOC VAC/ADMIN/RCVD: CPT | Performed by: FAMILY MEDICINE

## 2020-05-18 PROCEDURE — G0439 PPPS, SUBSEQ VISIT: HCPCS | Performed by: FAMILY MEDICINE

## 2020-05-18 PROCEDURE — 3008F BODY MASS INDEX DOCD: CPT | Performed by: FAMILY MEDICINE

## 2020-05-18 PROCEDURE — 99214 OFFICE O/P EST MOD 30 MIN: CPT | Performed by: FAMILY MEDICINE

## 2020-05-18 PROCEDURE — 1170F FXNL STATUS ASSESSED: CPT | Performed by: FAMILY MEDICINE

## 2020-05-18 PROCEDURE — 1036F TOBACCO NON-USER: CPT | Performed by: FAMILY MEDICINE

## 2020-05-18 PROCEDURE — 3079F DIAST BP 80-89 MM HG: CPT | Performed by: FAMILY MEDICINE

## 2020-05-18 PROCEDURE — 1123F ACP DISCUSS/DSCN MKR DOCD: CPT | Performed by: FAMILY MEDICINE

## 2020-08-25 ENCOUNTER — OFFICE VISIT (OUTPATIENT)
Dept: FAMILY MEDICINE CLINIC | Facility: CLINIC | Age: 70
End: 2020-08-25
Payer: MEDICARE

## 2020-08-25 VITALS
HEIGHT: 68 IN | SYSTOLIC BLOOD PRESSURE: 122 MMHG | DIASTOLIC BLOOD PRESSURE: 78 MMHG | HEART RATE: 68 BPM | WEIGHT: 177.8 LBS | BODY MASS INDEX: 26.95 KG/M2 | TEMPERATURE: 97.7 F | RESPIRATION RATE: 17 BRPM

## 2020-08-25 DIAGNOSIS — G47.00 INSOMNIA, UNSPECIFIED TYPE: Primary | ICD-10-CM

## 2020-08-25 DIAGNOSIS — Z23 NEED FOR PROPHYLACTIC VACCINATION AND INOCULATION AGAINST INFLUENZA: ICD-10-CM

## 2020-08-25 PROCEDURE — 90662 IIV NO PRSV INCREASED AG IM: CPT

## 2020-08-25 PROCEDURE — 1036F TOBACCO NON-USER: CPT | Performed by: FAMILY MEDICINE

## 2020-08-25 PROCEDURE — 3074F SYST BP LT 130 MM HG: CPT | Performed by: FAMILY MEDICINE

## 2020-08-25 PROCEDURE — G0008 ADMIN INFLUENZA VIRUS VAC: HCPCS

## 2020-08-25 PROCEDURE — 4040F PNEUMOC VAC/ADMIN/RCVD: CPT | Performed by: FAMILY MEDICINE

## 2020-08-25 PROCEDURE — 99213 OFFICE O/P EST LOW 20 MIN: CPT | Performed by: FAMILY MEDICINE

## 2020-08-25 PROCEDURE — 1160F RVW MEDS BY RX/DR IN RCRD: CPT | Performed by: FAMILY MEDICINE

## 2020-08-25 PROCEDURE — 3078F DIAST BP <80 MM HG: CPT | Performed by: FAMILY MEDICINE

## 2020-08-25 PROCEDURE — 3008F BODY MASS INDEX DOCD: CPT | Performed by: FAMILY MEDICINE

## 2020-08-25 RX ORDER — TRAZODONE HYDROCHLORIDE 50 MG/1
50 TABLET ORAL
Qty: 30 TABLET | Refills: 2 | Status: SHIPPED | OUTPATIENT
Start: 2020-08-25 | End: 2020-11-04

## 2020-08-25 NOTE — PATIENT INSTRUCTIONS
Insomnia   WHAT YOU NEED TO KNOW:   Insomnia is a condition that makes it hard to fall or stay asleep  Lack of sleep can lead to attention or memory problems during the day  You may also be issa, depressed, clumsy, or have headaches  DISCHARGE INSTRUCTIONS:   Contact your healthcare provider if:   · Your symptoms do not get better, or they get worse  · You begin to use drugs or alcohol to fall asleep  · You have questions or concerns about your condition or care  Medicines:   · Medicines  may help you sleep more regularly or help you feel less anxious  · Take your medicine as directed  Contact your healthcare provider if you think your medicine is not helping or if you have side effects  Tell him or her if you are allergic to any medicine  Keep a list of the medicines, vitamins, and herbs you take  Include the amounts, and when and why you take them  Bring the list or the pill bottles to follow-up visits  Carry your medicine list with you in case of an emergency  What you can do to improve your sleep:   · Create a sleep schedule  This will help you form a sleep routine  Keep a record of your sleep patterns, and any sleeping problems you have  Bring the record to follow-up visits with healthcare providers  · Do not take naps  Naps could make it hard for you to fall asleep at bedtime  · Keep your bedroom cool, quiet, and dark  Turn on white noise, such as a fan, to help you relax  Do not use your bed for any activity that will keep you awake  Do not read, exercise, eat, or watch TV in your bedroom  · Get up if you do not fall asleep within 20 minutes  Move to another room and do something relaxing until you become sleepy  · Limit caffeine, alcohol, and food to earlier in the day  Only drink caffeine in the morning  Do not drink alcohol within 6 hours of bedtime  Do not eat a heavy meal right before you go to bed  · Exercise regularly  Daily exercise may help you sleep better   Do not exercise within 4 hours of bedtime  Follow up with your healthcare provider as directed: Your healthcare provider may refer you for cognitive behavioral therapy  A behavioral therapist may help you find ways to relax, decrease stress, and improve sleep  Write down your questions so you remember to ask them during your visits  © 2017 [employer] Information is for End User's use only and may not be sold, redistributed or otherwise used for commercial purposes  All illustrations and images included in CareNotes® are the copyrighted property of [employer]  or Casey Diaz  The above information is an  only  It is not intended as medical advice for individual conditions or treatments  Talk to your doctor, nurse or pharmacist before following any medical regimen to see if it is safe and effective for you

## 2020-08-26 NOTE — PROGRESS NOTES
Assessment/Plan:    1  Insomnia, unspecified type  - discussed sleep hygiene, educational materials given, will start patient on Trazodone 50 mg at bedtime for 1 month, then may use as needed  - traZODone (DESYREL) 50 mg tablet; Take 1 tablet (50 mg total) by mouth daily at bedtime  Dispense: 30 tablet; Refill: 2    2  Need for prophylactic vaccination and inoculation against influenza  - influenza vaccine, high-dose, PF 0 7 mL (FLUZONE HIGH-DOSE)    Follow up as previously scheduled or sooner as needed  Possible side effects of new medication were reviewed with the patient today  The patient understands and agrees with the treatment plan  Subjective:   Chief Complaint   Patient presents with    Insomnia      Patient ID: Mayra Gamble is a 77 y o  male who presents today with complaining of difficulty staying asleep for the past few weeks, he reports no trouble falling asleep, he is waking up at 1 or 2 am most nights and having difficulty going back to sleep  Patient denies depressed mood, anxiety or feeling stressed, no increased nocturia  The following portions of the patient's history were reviewed and updated as appropriate: allergies, current medications, past family history, past medical history, past social history, past surgical history and problem list     Past Medical History:   Diagnosis Date    Adenomatous colon polyp     Aortic regurgitation     Aortic stenosis     Diverticulosis     Heart murmur     Hemorrhoids     Last Assessed: 11/10/17    Hyperlipidemia     Hypertension     Serrated adenoma of colon     Visual disturbances     Vitamin D deficiency      Past Surgical History:   Procedure Laterality Date    COLONOSCOPY      COLONOSCOPY N/A 11/28/2017    Procedure: COLONOSCOPY with polypectomy, random bx, and hemorrhoid banding;  Surgeon: Elizabeth Lucia MD;  Location: [facility];   Service: General    COLONOSCOPY N/A 2/26/2018    Procedure: COLONOSCOPY;  Surgeon: Ngoc Hauser Yen Corbin MD;  Location: [facility]; Service: Gastroenterology    MOUTH SURGERY      NM COLONOSCOPY FLX DX W[facility] WHEN PFRMD N/A 11/29/2016    Procedure: COLONOSCOPY POSSIBLE BANDING;  Surgeon: Giovanna Pendleton MD;  Location: [facility]; Service: General     Family History   Problem Relation Age of Onset    Asthma Mother     Heart failure Mother         Congestive    Depression Mother     Alcohol abuse Father     Stroke Father         CVA    Thyroid disease Sister     Heart attack Brother         Myocardial Infarction    Depression Brother     Stroke Family         Syndrome     Social History     Socioeconomic History    Marital status: /Civil Union     Spouse name: Not on file    Number of children: Not on file    Years of education: Not on file    Highest education level: Not on file   Occupational History    Not on file   Social Needs    Financial resource strain: Not on file    Food insecurity     Worry: Not on file     Inability: Not on file   [employer] needs     Medical: Not on file     Non-medical: Not on file   Tobacco Use    Smoking status: Never Smoker    Smokeless tobacco: Never Used   Substance and Sexual Activity    Alcohol use: Yes     Comment: 2 beers daily   Per Allscripts: minimal usage    Drug use: No    Sexual activity: Not on file   Lifestyle    Physical activity     Days per week: Not on file     Minutes per session: Not on file    Stress: Not on file   Relationships    Social connections     Talks on phone: Not on file     Gets together: Not on file     Attends Yazidism service: Not on file     Active member of club or organization: Not on file     Attends meetings of clubs or organizations: Not on file     Relationship status: Not on file    Intimate partner violence     Fear of current or ex partner: Not on file     Emotionally abused: Not on file     Physically abused: Not on file     Forced sexual activity: Not on file   Other Topics Concern  Not on file   Social History Narrative    Caffeine use: 3 cups coffe per day    Has smoke detectors    Uses safety equipment: seatbelts       Current Outpatient Medications:     aspirin 81 MG tablet, Take 81 mg by mouth daily, Disp: , Rfl:     atorvastatin (LIPITOR) 40 mg tablet, Take 1 tablet (40 mg total) by mouth daily, Disp: 90 tablet, Rfl: 3    Cholecalciferol (VITAMIN D-400 PO), Take by mouth, Disp: , Rfl:     docusate sodium (COLACE) 50 mg capsule, Take by mouth daily, Disp: , Rfl:     losartan (COZAAR) 100 MG tablet, TAKE 1 TABLET DAILY, Disp: 90 tablet, Rfl: 4    Multiple Vitamins-Minerals (CENTRUM SILVER PO), Take by mouth daily, Disp: , Rfl:     Omega-3 Fatty Acids (OMEGA 3 PO), Take by mouth daily  , Disp: , Rfl:     traZODone (DESYREL) 50 mg tablet, Take 1 tablet (50 mg total) by mouth daily at bedtime, Disp: 30 tablet, Rfl: 2    Review of Systems   Constitutional: Negative for appetite change, chills, fatigue, fever and unexpected weight change  Respiratory: Negative for cough, shortness of breath and wheezing  Cardiovascular: Negative for chest pain, palpitations and leg swelling  Gastrointestinal: Negative for abdominal pain, blood in stool, constipation, diarrhea, nausea and vomiting  Genitourinary: Negative for difficulty urinating, dysuria, frequency, hematuria and urgency  Neurological: Negative for dizziness and headaches  Hematological: Negative for adenopathy  Psychiatric/Behavioral: Positive for sleep disturbance  Negative for dysphoric mood  The patient is not nervous/anxious  Objective:    Vitals:    08/25/20 1053   BP: 122/78   BP Location: Left arm   Patient Position: Sitting   Cuff Size: Standard   Pulse: 68   Resp: 17   Temp: 97 7 °F (36 5 °C)   TempSrc: Temporal   Weight: 80 6 kg (177 lb 12 8 oz)   Height: 5' 7 75" (1 721 m)        Physical Exam  Constitutional:       General: He is not in acute distress  Appearance: He is well-developed     Neck: Musculoskeletal: Neck supple  Thyroid: No thyromegaly  Cardiovascular:      Rate and Rhythm: Normal rate and regular rhythm  Heart sounds: Normal heart sounds  No murmur  Pulmonary:      Effort: Pulmonary effort is normal  No respiratory distress  Breath sounds: No wheezing, rhonchi or rales  Abdominal:      General: There is no distension  Palpations: Abdomen is soft  There is no mass  Tenderness: There is no abdominal tenderness  Lymphadenopathy:      Cervical: No cervical adenopathy  Neurological:      Mental Status: He is alert and oriented to person, place, and time  Psychiatric:         Mood and Affect: Mood normal          Behavior: Behavior normal          Thought Content:  Thought content normal          Judgment: Judgment normal

## 2020-10-20 ENCOUNTER — HOSPITAL ENCOUNTER (OUTPATIENT)
Dept: NON INVASIVE DIAGNOSTICS | Facility: CLINIC | Age: 70
Discharge: HOME | End: 2020-10-20
Payer: MEDICARE

## 2020-10-20 DIAGNOSIS — I10 ESSENTIAL HYPERTENSION: ICD-10-CM

## 2020-10-20 DIAGNOSIS — Q23.1 BICUSPID AORTIC VALVE: ICD-10-CM

## 2020-10-20 DIAGNOSIS — I35.1 NONRHEUMATIC AORTIC VALVE INSUFFICIENCY: ICD-10-CM

## 2020-10-20 PROCEDURE — 93306 TTE W/DOPPLER COMPLETE: CPT

## 2020-10-20 PROCEDURE — 93306 TTE W/DOPPLER COMPLETE: CPT | Performed by: INTERNAL MEDICINE

## 2020-10-24 DIAGNOSIS — E78.5 HYPERLIPIDEMIA, UNSPECIFIED HYPERLIPIDEMIA TYPE: ICD-10-CM

## 2020-10-24 RX ORDER — ATORVASTATIN CALCIUM 40 MG/1
TABLET, FILM COATED ORAL
Qty: 90 TABLET | Refills: 3 | Status: SHIPPED | OUTPATIENT
Start: 2020-10-24 | End: 2021-10-19

## 2020-11-05 ENCOUNTER — OFFICE VISIT (OUTPATIENT)
Dept: CARDIOLOGY CLINIC | Facility: CLINIC | Age: 70
End: 2020-11-05
Payer: MEDICARE

## 2020-11-05 VITALS
WEIGHT: 181.9 LBS | HEART RATE: 64 BPM | TEMPERATURE: 97.5 F | SYSTOLIC BLOOD PRESSURE: 136 MMHG | BODY MASS INDEX: 29.23 KG/M2 | DIASTOLIC BLOOD PRESSURE: 84 MMHG | HEIGHT: 66 IN

## 2020-11-05 DIAGNOSIS — I35.1 NONRHEUMATIC AORTIC VALVE INSUFFICIENCY: Primary | ICD-10-CM

## 2020-11-05 DIAGNOSIS — E78.00 PURE HYPERCHOLESTEROLEMIA: ICD-10-CM

## 2020-11-05 DIAGNOSIS — Q23.1 BICUSPID AORTIC VALVE: ICD-10-CM

## 2020-11-05 DIAGNOSIS — I10 ESSENTIAL HYPERTENSION: ICD-10-CM

## 2020-11-05 DIAGNOSIS — Z86.69 H/O AMAUROSIS FUGAX: ICD-10-CM

## 2020-11-05 PROCEDURE — 99214 OFFICE O/P EST MOD 30 MIN: CPT | Performed by: INTERNAL MEDICINE

## 2020-11-09 ENCOUNTER — LAB (OUTPATIENT)
Dept: LAB | Facility: CLINIC | Age: 70
End: 2020-11-09
Payer: MEDICARE

## 2020-11-09 DIAGNOSIS — E78.5 HYPERLIPIDEMIA, UNSPECIFIED HYPERLIPIDEMIA TYPE: ICD-10-CM

## 2020-11-09 DIAGNOSIS — I10 ESSENTIAL HYPERTENSION: ICD-10-CM

## 2020-11-09 DIAGNOSIS — Z12.5 SCREENING FOR PROSTATE CANCER: ICD-10-CM

## 2020-11-09 LAB
ALBUMIN SERPL BCP-MCNC: 4.1 G/DL (ref 3.5–5)
ALP SERPL-CCNC: 86 U/L (ref 46–116)
ALT SERPL W P-5'-P-CCNC: 35 U/L (ref 12–78)
ANION GAP SERPL CALCULATED.3IONS-SCNC: 5 MMOL/L (ref 4–13)
AST SERPL W P-5'-P-CCNC: 20 U/L (ref 5–45)
BILIRUB SERPL-MCNC: 0.75 MG/DL (ref 0.2–1)
BUN SERPL-MCNC: 12 MG/DL (ref 5–25)
CALCIUM SERPL-MCNC: 9.8 MG/DL (ref 8.3–10.1)
CHLORIDE SERPL-SCNC: 105 MMOL/L (ref 100–108)
CHOLEST SERPL-MCNC: 210 MG/DL (ref 50–200)
CO2 SERPL-SCNC: 27 MMOL/L (ref 21–32)
CREAT SERPL-MCNC: 0.73 MG/DL (ref 0.6–1.3)
GFR SERPL CREATININE-BSD FRML MDRD: 95 ML/MIN/1.73SQ M
GLUCOSE P FAST SERPL-MCNC: 100 MG/DL (ref 65–99)
HDLC SERPL-MCNC: 46 MG/DL
LDLC SERPL CALC-MCNC: 139 MG/DL (ref 0–100)
POTASSIUM SERPL-SCNC: 4.3 MMOL/L (ref 3.5–5.3)
PROT SERPL-MCNC: 7.7 G/DL (ref 6.4–8.2)
PSA SERPL-MCNC: 2.3 NG/ML (ref 0–4)
SODIUM SERPL-SCNC: 137 MMOL/L (ref 136–145)
TRIGL SERPL-MCNC: 123 MG/DL

## 2020-11-09 PROCEDURE — 80061 LIPID PANEL: CPT

## 2020-11-09 PROCEDURE — 36415 COLL VENOUS BLD VENIPUNCTURE: CPT

## 2020-11-09 PROCEDURE — G0103 PSA SCREENING: HCPCS

## 2020-11-09 PROCEDURE — 80053 COMPREHEN METABOLIC PANEL: CPT

## 2020-11-18 ENCOUNTER — OFFICE VISIT (OUTPATIENT)
Dept: FAMILY MEDICINE CLINIC | Facility: CLINIC | Age: 70
End: 2020-11-18
Payer: MEDICARE

## 2020-11-18 VITALS
RESPIRATION RATE: 16 BRPM | HEART RATE: 80 BPM | WEIGHT: 179 LBS | BODY MASS INDEX: 27.13 KG/M2 | SYSTOLIC BLOOD PRESSURE: 138 MMHG | DIASTOLIC BLOOD PRESSURE: 76 MMHG | TEMPERATURE: 96 F | HEIGHT: 68 IN

## 2020-11-18 DIAGNOSIS — E03.8 SUBCLINICAL HYPOTHYROIDISM: ICD-10-CM

## 2020-11-18 DIAGNOSIS — I10 ESSENTIAL HYPERTENSION: Primary | ICD-10-CM

## 2020-11-18 DIAGNOSIS — I35.1 NONRHEUMATIC AORTIC VALVE INSUFFICIENCY: ICD-10-CM

## 2020-11-18 DIAGNOSIS — E78.5 HYPERLIPIDEMIA, UNSPECIFIED HYPERLIPIDEMIA TYPE: ICD-10-CM

## 2020-11-18 PROCEDURE — 99214 OFFICE O/P EST MOD 30 MIN: CPT | Performed by: FAMILY MEDICINE

## 2020-11-18 RX ORDER — KETOCONAZOLE 20 MG/G
CREAM TOPICAL AS NEEDED
COMMUNITY
Start: 2020-11-04

## 2021-03-02 ENCOUNTER — OFFICE VISIT (OUTPATIENT)
Dept: FAMILY MEDICINE CLINIC | Facility: CLINIC | Age: 71
End: 2021-03-02
Payer: MEDICARE

## 2021-03-02 VITALS
HEART RATE: 58 BPM | WEIGHT: 179.4 LBS | RESPIRATION RATE: 16 BRPM | BODY MASS INDEX: 27.19 KG/M2 | TEMPERATURE: 97.7 F | DIASTOLIC BLOOD PRESSURE: 90 MMHG | SYSTOLIC BLOOD PRESSURE: 136 MMHG | HEIGHT: 68 IN

## 2021-03-02 DIAGNOSIS — M25.511 ACUTE PAIN OF RIGHT SHOULDER: Primary | ICD-10-CM

## 2021-03-02 DIAGNOSIS — Z12.11 ENCOUNTER FOR SCREENING FOR MALIGNANT NEOPLASM OF COLON: ICD-10-CM

## 2021-03-02 PROCEDURE — 99213 OFFICE O/P EST LOW 20 MIN: CPT | Performed by: FAMILY MEDICINE

## 2021-03-02 NOTE — PATIENT INSTRUCTIONS
Exercises for Shoulder Flexion and Extension   AMBULATORY CARE:   Shoulder flexion and extension exercises  work the muscles in your upper back  Contact your healthcare provider if:   · You have sharp or worsening pain during exercise or at rest     · You have questions or concerns about your shoulder exercises  Before you exercise:  Warm up and stretch before you exercise  Walk or ride a stationary bike for 5 to 10 minutes to help you warm up  Stretching helps increase range of motion  It may also decrease muscle soreness and help prevent another injury  Your healthcare provider will tell you which of the following stretches to do:  · Crossover arm stretch:  Relax your shoulders  Hold your upper arm with the opposite hand  Pull your arm across your chest until you feel a stretch  Hold the stretch for 30 seconds  Return to the starting position  · Shoulder flexion stretch:  Stand facing a wall  Slowly walk your fingers up the wall until you feel a stretch  Hold for 30 seconds  Return to the starting position  · Sleeper stretch:  Lie on your side on a firm, flat surface with your injured arm tucked under you  Place your head on a pillow for comfort  Bend the elbow of your injured arm 90°  Use your arm that is not injured to slowly push your injured arm down  Stop when you feel a stretch at the back of your injured shoulder  Hold for 30 seconds  Slowly return to the starting position  How to exercise with a weight:  Your healthcare provider will tell you how much weight to exercise with  · Shoulder extension:  Lie on a hard table on your stomach  Let your arms hang off the side  Hold a weight in both hands with your palms facing toward your body  Keep your arms straight and slowly raise your arms parallel to the floor in a "Y" shape  Stop when your arms are level with your body  Hold for as long as directed  Slowly return to the starting position       · Shoulder flexion:  Stand and hold a weight in the hand of your injured shoulder  Keep your arm straight and slowly raise your arm over your head as far as you can without pain  Do not raise your arm over your head unless your healthcare provider says it is okay  Do not let your shoulder shrug  Hold this position for as many seconds as directed  Slowly return to the starting position  How to exercise with an exercise band:   · Shoulder extension:  Wrap the exercise band around a heavy, stable object  The band should be level with your chest  Stand and hold each end of the band in both hands  Step back and extend your arms straight  Squeeze your shoulder blades together and pull your arms back and down  Hold for as long as directed  Slowly return to the starting position  · Shoulder flexion:  Wrap the exercise band around a heavy, stable object near your foot  Grab the band with the hand of your injured shoulder  Keep your arm straight  Slowly pull the band up and past your head as far as you can without pain  Do not raise your arm over your head unless your healthcare provider says it is okay  Do not let your shoulder shrug  Hold this position for as many seconds as directed  Slowly return to the starting position  Follow up with your physical therapist as directed:  Write down your questions so you remember to ask them during your visits  © Copyright [address] [place] [address] Drive Information is for End User's use only and may not be sold, redistributed or otherwise used for commercial purposes  All illustrations and images included in CareNotes® are the copyrighted property of A D A M [employer]  or Scott Peña   The above information is an  only  It is not intended as medical advice for individual conditions or treatments  Talk to your doctor, nurse or pharmacist before following any medical regimen to see if it is safe and effective for you

## 2021-03-02 NOTE — PROGRESS NOTES
Assessment/Plan:    1  Acute pain of right shoulder  - discussed rest, ice, Aleve or Advil as needed, hand out given for home exercises, discussed imaging and physical therapy if symptoms persist    2  Encounter for screening for malignant neoplasm of colon  - Ambulatory referral to Gastroenterology; Future    Return as scheduled or sooner as needed  The patient understands and agrees with the treatment plan  Subjective:   Chief Complaint   Patient presents with    Shoulder Pain      Patient ID: Camila English is a 72 y o  male here with c/o right shoulder pain radiating into his right arm onset after shoveling snow last Monday a week ago, his pain is worse after repetitive overhead activities or when sleeping on his right side  Patient denies any trauma, neck pain, right arm weakness or numbness, or prior problems with his right shoulder  The following portions of the patient's history were reviewed and updated as appropriate: allergies, current medications, past family history, past medical history, past social history, past surgical history and problem list     Past Medical History:   Diagnosis Date    Adenomatous colon polyp     Aortic regurgitation     Aortic stenosis     Diverticulosis     Hemorrhoids     Last Assessed: 11/10/17    Hypertension     Serrated adenoma of colon     Visual disturbances     Vitamin D deficiency      Past Surgical History:   Procedure Laterality Date    COLONOSCOPY      COLONOSCOPY N/A 11/28/2017    Procedure: COLONOSCOPY with polypectomy, random bx, and hemorrhoid banding;  Surgeon: Prakash Mcwilliams MD;  Location: [facility]; Service: General    COLONOSCOPY N/A 2/26/2018    Procedure: COLONOSCOPY;  Surgeon: Patricio Núñez MD;  Location: [facility];   Service: Gastroenterology    MOUTH SURGERY      [facility] PFR[facility] N/A 11/29/2016    Procedure: COLONOSCOPY POSSIBLE BANDING;  Surgeon: Prakash Mcwilliams MD;  Location: [facility];  Service: General     Family History   Problem Relation Age of Onset    Asthma Mother     Heart failure Mother         Congestive    Depression Mother     Alcohol abuse Father     Stroke Father         CVA    Thyroid disease Sister     Heart attack Brother         Myocardial Infarction    Depression Brother     Stroke Family         Syndrome     Social History     Socioeconomic History    Marital status: /Civil Union     Spouse name: Not on file    Number of children: Not on file    Years of education: Not on file    Highest education level: Not on file   Occupational History    Not on file   Social Needs    Financial resource strain: Not on file    Food insecurity     Worry: Not on file     Inability: Not on file   [employer] needs     Medical: Not on file     Non-medical: Not on file   Tobacco Use    Smoking status: Never Smoker    Smokeless tobacco: Never Used   Substance and Sexual Activity    Alcohol use: Yes     Comment: 2 beers daily   Per Allscripts: minimal usage    Drug use: No    Sexual activity: Not on file   Lifestyle    Physical activity     Days per week: Not on file     Minutes per session: Not on file    Stress: Not on file   Relationships    Social connections     Talks on phone: Not on file     Gets together: Not on file     Attends Buddhism service: Not on file     Active member of club or organization: Not on file     Attends meetings of clubs or organizations: Not on file     Relationship status: Not on file    Intimate partner violence     Fear of current or ex partner: Not on file     Emotionally abused: Not on file     Physically abused: Not on file     Forced sexual activity: Not on file   Other Topics Concern    Not on file   Social History Narrative    Caffeine use: 3 cups coffe per day    Has smoke detectors    Uses safety equipment: seatbelts       Current Outpatient Medications:     aspirin 81 MG tablet, Take 81 mg by mouth daily, Disp: , Rfl:     atorvastatin (LIPITOR) 40 mg tablet, TAKE 1 TABLET DAILY, Disp: 90 tablet, Rfl: 3    Cholecalciferol (VITAMIN D-400 PO), Take by mouth, Disp: , Rfl:     docusate sodium (COLACE) 50 mg capsule, Take by mouth daily, Disp: , Rfl:     ketoconazole (NIZORAL) 2 % cream, as needed , Disp: , Rfl:     losartan (COZAAR) 100 MG tablet, TAKE 1 TABLET DAILY, Disp: 90 tablet, Rfl: 4    Multiple Vitamins-Minerals (CENTRUM SILVER PO), Take by mouth daily, Disp: , Rfl:     Omega-3 Fatty Acids (OMEGA 3 PO), Take by mouth daily  , Disp: , Rfl:     Review of Systems   Constitutional: Negative for chills, fatigue and fever  Musculoskeletal: Negative for back pain and neck pain  Right shoulder pain   Skin: Negative for rash  Neurological: Negative for weakness and numbness  Objective:    Vitals:    03/02/21 1046   BP: 136/90   BP Location: Left arm   Patient Position: Sitting   Cuff Size: Standard   Pulse: 58   Resp: 16   Temp: 97 7 °F (36 5 °C)   TempSrc: Oral   Weight: 81 4 kg (179 lb 6 4 oz)   Height: 5' 7 75" (1 721 m)        Physical Exam  Constitutional:       General: He is not in acute distress  Musculoskeletal:      Right shoulder: He exhibits normal range of motion and no tenderness  Cervical back: He exhibits normal range of motion and no tenderness  Comments: Muscle strength 5/5 in upper extremities   Neurological:      Mental Status: He is alert and oriented to person, place, and time     Psychiatric:         Mood and Affect: Mood normal          Behavior: Behavior normal

## 2021-03-10 DIAGNOSIS — Z23 ENCOUNTER FOR IMMUNIZATION: ICD-10-CM

## 2021-05-03 ENCOUNTER — APPOINTMENT (OUTPATIENT)
Dept: LAB | Facility: CLINIC | Age: 71
End: 2021-05-03
Payer: MEDICARE

## 2021-05-03 DIAGNOSIS — E03.8 SUBCLINICAL HYPOTHYROIDISM: ICD-10-CM

## 2021-05-03 DIAGNOSIS — I10 ESSENTIAL HYPERTENSION: ICD-10-CM

## 2021-05-03 DIAGNOSIS — E78.5 HYPERLIPIDEMIA, UNSPECIFIED HYPERLIPIDEMIA TYPE: ICD-10-CM

## 2021-05-03 LAB
ALBUMIN SERPL BCP-MCNC: 3.8 G/DL (ref 3.5–5)
ALP SERPL-CCNC: 78 U/L (ref 46–116)
ALT SERPL W P-5'-P-CCNC: 38 U/L (ref 12–78)
ANION GAP SERPL CALCULATED.3IONS-SCNC: 4 MMOL/L (ref 4–13)
AST SERPL W P-5'-P-CCNC: 23 U/L (ref 5–45)
BASOPHILS # BLD AUTO: 0.03 THOUSANDS/ΜL (ref 0–0.1)
BASOPHILS NFR BLD AUTO: 1 % (ref 0–1)
BILIRUB SERPL-MCNC: 0.63 MG/DL (ref 0.2–1)
BILIRUB UR QL STRIP: NEGATIVE
BUN SERPL-MCNC: 15 MG/DL (ref 5–25)
CALCIUM SERPL-MCNC: 9.6 MG/DL (ref 8.3–10.1)
CHLORIDE SERPL-SCNC: 106 MMOL/L (ref 100–108)
CHOLEST SERPL-MCNC: 204 MG/DL (ref 50–200)
CLARITY UR: CLEAR
CO2 SERPL-SCNC: 26 MMOL/L (ref 21–32)
COLOR UR: YELLOW
CREAT SERPL-MCNC: 0.78 MG/DL (ref 0.6–1.3)
EOSINOPHIL # BLD AUTO: 0.16 THOUSAND/ΜL (ref 0–0.61)
EOSINOPHIL NFR BLD AUTO: 3 % (ref 0–6)
ERYTHROCYTE [DISTWIDTH] IN BLOOD BY AUTOMATED COUNT: 13 % (ref 11.6–15.1)
GFR SERPL CREATININE-BSD FRML MDRD: 92 ML/MIN/1.73SQ M
GLUCOSE P FAST SERPL-MCNC: 97 MG/DL (ref 65–99)
GLUCOSE UR STRIP-MCNC: NEGATIVE MG/DL
HCT VFR BLD AUTO: 46.6 % (ref 36.5–49.3)
HDLC SERPL-MCNC: 45 MG/DL
HGB BLD-MCNC: 15.4 G/DL (ref 12–17)
HGB UR QL STRIP.AUTO: NEGATIVE
IMM GRANULOCYTES # BLD AUTO: 0.01 THOUSAND/UL (ref 0–0.2)
IMM GRANULOCYTES NFR BLD AUTO: 0 % (ref 0–2)
KETONES UR STRIP-MCNC: NEGATIVE MG/DL
LDLC SERPL CALC-MCNC: 139 MG/DL (ref 0–100)
LEUKOCYTE ESTERASE UR QL STRIP: NEGATIVE
LYMPHOCYTES # BLD AUTO: 1.44 THOUSANDS/ΜL (ref 0.6–4.47)
LYMPHOCYTES NFR BLD AUTO: 26 % (ref 14–44)
MCH RBC QN AUTO: 31 PG (ref 26.8–34.3)
MCHC RBC AUTO-ENTMCNC: 33 G/DL (ref 31.4–37.4)
MCV RBC AUTO: 94 FL (ref 82–98)
MONOCYTES # BLD AUTO: 0.74 THOUSAND/ΜL (ref 0.17–1.22)
MONOCYTES NFR BLD AUTO: 13 % (ref 4–12)
NEUTROPHILS # BLD AUTO: 3.21 THOUSANDS/ΜL (ref 1.85–7.62)
NEUTS SEG NFR BLD AUTO: 57 % (ref 43–75)
NITRITE UR QL STRIP: NEGATIVE
NRBC BLD AUTO-RTO: 0 /100 WBCS
PH UR STRIP.AUTO: 6.5 [PH]
PLATELET # BLD AUTO: 180 THOUSANDS/UL (ref 149–390)
PMV BLD AUTO: 11.6 FL (ref 8.9–12.7)
POTASSIUM SERPL-SCNC: 4.4 MMOL/L (ref 3.5–5.3)
PROT SERPL-MCNC: 7.5 G/DL (ref 6.4–8.2)
PROT UR STRIP-MCNC: NEGATIVE MG/DL
RBC # BLD AUTO: 4.97 MILLION/UL (ref 3.88–5.62)
SODIUM SERPL-SCNC: 136 MMOL/L (ref 136–145)
SP GR UR STRIP.AUTO: 1.01 (ref 1–1.03)
TRIGL SERPL-MCNC: 101 MG/DL
TSH SERPL DL<=0.05 MIU/L-ACNC: 3.35 UIU/ML (ref 0.36–3.74)
UROBILINOGEN UR QL STRIP.AUTO: 0.2 E.U./DL
WBC # BLD AUTO: 5.59 THOUSAND/UL (ref 4.31–10.16)

## 2021-05-03 PROCEDURE — 85025 COMPLETE CBC W/AUTO DIFF WBC: CPT

## 2021-05-03 PROCEDURE — 36415 COLL VENOUS BLD VENIPUNCTURE: CPT

## 2021-05-03 PROCEDURE — 80061 LIPID PANEL: CPT

## 2021-05-03 PROCEDURE — 80053 COMPREHEN METABOLIC PANEL: CPT

## 2021-05-03 PROCEDURE — 84443 ASSAY THYROID STIM HORMONE: CPT

## 2021-05-03 PROCEDURE — 81003 URINALYSIS AUTO W/O SCOPE: CPT

## 2021-05-18 DIAGNOSIS — I10 ESSENTIAL HYPERTENSION: ICD-10-CM

## 2021-05-18 RX ORDER — LOSARTAN POTASSIUM 100 MG/1
TABLET ORAL
Qty: 90 TABLET | Refills: 3 | Status: SHIPPED | OUTPATIENT
Start: 2021-05-18 | End: 2022-02-01 | Stop reason: SDUPTHER

## 2021-05-21 ENCOUNTER — OFFICE VISIT (OUTPATIENT)
Dept: FAMILY MEDICINE CLINIC | Facility: CLINIC | Age: 71
End: 2021-05-21
Payer: MEDICARE

## 2021-05-21 VITALS
SYSTOLIC BLOOD PRESSURE: 140 MMHG | RESPIRATION RATE: 16 BRPM | DIASTOLIC BLOOD PRESSURE: 82 MMHG | BODY MASS INDEX: 27.25 KG/M2 | HEART RATE: 56 BPM | WEIGHT: 179.8 LBS | HEIGHT: 68 IN

## 2021-05-21 DIAGNOSIS — I35.1 NONRHEUMATIC AORTIC VALVE INSUFFICIENCY: ICD-10-CM

## 2021-05-21 DIAGNOSIS — Z12.5 SCREENING FOR PROSTATE CANCER: ICD-10-CM

## 2021-05-21 DIAGNOSIS — E78.5 HYPERLIPIDEMIA, UNSPECIFIED HYPERLIPIDEMIA TYPE: ICD-10-CM

## 2021-05-21 DIAGNOSIS — Z00.00 MEDICARE ANNUAL WELLNESS VISIT, SUBSEQUENT: Primary | ICD-10-CM

## 2021-05-21 DIAGNOSIS — I10 ESSENTIAL HYPERTENSION: ICD-10-CM

## 2021-05-21 DIAGNOSIS — E03.8 SUBCLINICAL HYPOTHYROIDISM: ICD-10-CM

## 2021-05-21 PROCEDURE — 1123F ACP DISCUSS/DSCN MKR DOCD: CPT | Performed by: FAMILY MEDICINE

## 2021-05-21 PROCEDURE — 99214 OFFICE O/P EST MOD 30 MIN: CPT | Performed by: FAMILY MEDICINE

## 2021-05-21 PROCEDURE — G0439 PPPS, SUBSEQ VISIT: HCPCS | Performed by: FAMILY MEDICINE

## 2021-05-21 NOTE — PROGRESS NOTES
Assessment and Plan:     1  Medicare annual wellness visit, subsequent    2  Screening for prostate cancer  - PSA, Total Screen; Future     Preventive health issues were discussed with patient, and age appropriate screening tests were ordered as noted in patient's After Visit Summary  Personalized health advice and appropriate referrals for health education or preventive services given if needed, as noted in patient's After Visit Summary  History of Present Illness:     Patient presents for Medicare Annual Wellness visit    Patient Care Team:  Melvin Kwan MD as PCP - MD Biju Jc Estel Kick, MD Jenni Cassis, MD as Endoscopist     Problem List:     Patient Active Problem List   Diagnosis    Serrated adenoma of colon    Aortic stenosis    Aortic regurgitation    Pure hypercholesterolemia    Essential hypertension    Bicuspid aortic valve    H/O amaurosis fugax 2016      Past Medical and Surgical History:     Past Medical History:   Diagnosis Date    Adenomatous colon polyp     Aortic regurgitation     Aortic stenosis     Diverticulosis     Hemorrhoids     Last Assessed: 11/10/17    Hypertension     Serrated adenoma of colon     Visual disturbances     Vitamin D deficiency      Past Surgical History:   Procedure Laterality Date    COLONOSCOPY      COLONOSCOPY N/A 11/28/2017    Procedure: COLONOSCOPY with polypectomy, random bx, and hemorrhoid banding;  Surgeon: Shabana London MD;  Location: [facility]; Service: General    COLONOSCOPY N/A 2/26/2018    Procedure: COLONOSCOPY;  Surgeon: Shannan Ochoa MD;  Location: [facility]; Service: Gastroenterology    MOUTH SURGERY      [place] COLONOSCOPY FLX DX W/[facility] WHEN PFRMD N/A 11/29/2016    Procedure: COLONOSCOPY POSSIBLE BANDING;  Surgeon: Shabana London MD;  Location: [facility];   Service: General      Family History:     Family History   Problem Relation Age of Onset    Asthma Mother  Heart failure Mother         Congestive    Depression Mother     Alcohol abuse Father     Stroke Father         CVA    Thyroid disease Sister     Heart attack Brother         Myocardial Infarction    Depression Brother     Stroke Family         Syndrome      Social History:        Social History     Socioeconomic History    Marital status: /Civil Union     Spouse name: None    Number of children: None    Years of education: None    Highest education level: None   Occupational History    None   Social Needs    Financial resource strain: None    Food insecurity     Worry: None     Inability: None    Transportation needs     Medical: None     Non-medical: None   Tobacco Use    Smoking status: Never Smoker    Smokeless tobacco: Never Used   Substance and Sexual Activity    Alcohol use: Yes     Comment: 2 beers daily   Per Allscripts: minimal usage    Drug use: No    Sexual activity: None   Lifestyle    Physical activity     Days per week: None     Minutes per session: None    Stress: None   Relationships    Social connections     Talks on phone: None     Gets together: None     Attends Confucianist service: None     Active member of club or organization: None     Attends meetings of clubs or organizations: None     Relationship status: None    Intimate partner violence     Fear of current or ex partner: None     Emotionally abused: None     Physically abused: None     Forced sexual activity: None   Other Topics Concern    None   Social History Narrative    Caffeine use: 3 cups coffe per day    Has smoke detectors    Uses safety equipment: seatbelts      Medications and Allergies:     Current Outpatient Medications   Medication Sig Dispense Refill    aspirin 81 MG tablet Take 81 mg by mouth daily      atorvastatin (LIPITOR) 40 mg tablet TAKE 1 TABLET DAILY 90 tablet 3    Cholecalciferol (VITAMIN D-400 PO) Take by mouth      docusate sodium (COLACE) 50 mg capsule Take by mouth daily  ketoconazole (NIZORAL) 2 % cream as needed       losartan (COZAAR) 100 MG tablet TAKE 1 TABLET DAILY 90 tablet 3    Multiple Vitamins-Minerals (CENTRUM SILVER PO) Take by mouth daily      Omega-3 Fatty Acids (OMEGA 3 PO) Take by mouth daily         No current facility-administered medications for this visit  Allergies   Allergen Reactions    Sulfa Antibiotics Rash     redness      Immunizations:     Immunization History   Administered Date(s) Administered    Fluzone Split Quad 0 5 mL 11/19/2013    INFLUENZA 11/19/2013    Influenza Quadrivalent Preservative Free 3 years and older IM 09/11/2015    Influenza Quadrivalent, 6-35 Months IM 11/10/2014, 09/09/2016    Influenza Split High Dose Preservative Free IM 09/18/2017    Influenza, high dose seasonal 0 7 mL 09/17/2018, 09/27/2019, 08/25/2020    Influenza, seasonal, injectable 08/30/2011, 11/13/2012    Pneumococcal Conjugate 13-Valent 03/10/2017    Pneumococcal Polysaccharide PPV23 03/23/2018    SARS-CoV-2 / COVID-19 mRNA IM (Yaquelin Juárez) 03/05/2021, 04/03/2021    Tdap 08/12/2008      Health Maintenance:         Topic Date Due    Colonoscopy Surveillance  02/26/2021    Colorectal Cancer Screening  02/26/2028    Hepatitis C Screening  Completed         Topic Date Due    COVID-19 Vaccine (1) Never done    DTaP,Tdap,and Td Vaccines (2 - Td) 08/12/2018      Medicare Health Risk Assessment:     /82   Pulse (!) 50   Resp 16   Ht 5' 7 75" (1 721 m)   Wt 81 6 kg (179 lb 12 8 oz)   BMI 27 54 kg/m²      Lorie Feldman is here for his Subsequent Wellness visit  Health Risk Assessment:   Patient rates overall health as very good  Patient feels that their physical health rating is same  Patient is very satisfied with their life  Eyesight was rated as same  Hearing was rated as same  Patient feels that their emotional and mental health rating is much better  Patients states they are sometimes angry   Patient states they are sometimes unusually tired/fatigued  Pain experienced in the last 7 days has been none  Patient states that he has experienced no weight loss or gain in last 6 months  Depression Screening:   PHQ-2 Score: 0      Fall Risk Screening: In the past year, patient has experienced: no history of falling in past year      Home Safety:  Patient does not have trouble with stairs inside or outside of their home  Patient has working smoke alarms and has working carbon monoxide detector  Home safety hazards include: none  Nutrition:   Current diet is Regular and Limited junk food  Medications:   Patient is currently taking over-the-counter supplements  OTC medications include: see medication list  Patient is able to manage medications  Activities of Daily Living (ADLs)/Instrumental Activities of Daily Living (IADLs):   Walk and transfer into and out of bed and chair?: Yes  Dress and groom yourself?: Yes    Bathe or shower yourself?: Yes    Feed yourself?  Yes  Do your laundry/housekeeping?: Yes  Manage your money, pay your bills and track your expenses?: Yes  Make your own meals?: Yes    Do your own shopping?: Yes    Previous Hospitalizations:   Any hospitalizations or ED visits within the last 12 months?: No      Advance Care Planning:   Living will: Yes    Advanced directive: Yes      Cognitive Screening:   Provider or family/friend/caregiver concerned regarding cognition?: No    PREVENTIVE SCREENINGS      Cardiovascular Screening:    General: Screening Not Indicated and History Lipid Disorder      Diabetes Screening:     General: Screening Current      Colorectal Cancer Screening:     General: Screening Current      Prostate Cancer Screening:    General: Screening Current      Osteoporosis Screening:    General: Screening Not Indicated      Abdominal Aortic Aneurysm (AAA) Screening:    Risk factors include: age between 73-69 yo        General: Screening Current      Lung Cancer Screening:     General: Screening Not Indicated Hepatitis C Screening:    General: Screening Current    Screening, Brief Intervention, and Referral to Treatment (SBIRT)    Screening  Typical number of drinks in a day: 2  Typical number of drinks in a week: 14  Interpretation: Low risk drinking behavior      Single Item Drug Screening:  How often have you used an illegal drug (including marijuana) or a prescription medication for non-medical reasons in the past year? never    Single Item Drug Screen Score: 0  Interpretation: Negative screen for possible drug use disorder      Azar Altamirano MD

## 2021-05-21 NOTE — PATIENT INSTRUCTIONS

## 2021-05-21 NOTE — PROGRESS NOTES
Assessment/Plan:    1  Essential hypertension  - stable on losartan 100 mg daily  - discussed low sodium diet and regular exercise  - Comprehensive metabolic panel; Future    2  Hyperlipidemia  - , continue Atorvastatin 40 mg daily  - advised to work on healthy diet and regular exercise  - will recheck lipid panel and CMP prior to next visit  - Lipid Panel with Direct LDL reflex; Future  - Comprehensive metabolic panel; Future    3  Subclinical hypothyroidism  - TSH 3 35, within normal range  - will continue monitoring and recheck TFT's in 6 months  - TSH, 3rd generation with Free T4 reflex; Future    4  Nonrheumatic aortic valve insufficiency  - follows with Cardiology       Return in 6 months or sooner as needed  The patient understands and agrees with the treatment plan  Subjective:   Chief Complaint   Patient presents with    Medicare Wellness Visit    Hypertension    Hypothyroidism    Hyperlipidemia      Patient ID: Edmond Hernandez is a 72 y o  male who presents today for follow up visit for hypertension, hyperlipidemia, hypothyroidism  Patient has no complaints at this time, since his last visit his right shoulder pain has resolved  Patient is compliant with his medications, he is working on [employer], but has been less physically active in the past several months  He denies chest pain, shortness of breath, palpitations, leg edema, orthopnea, dizziness or syncope         The following portions of the patient's history were reviewed and updated as appropriate: allergies, current medications, past family history, past medical history, past social history, past surgical history and problem list     Past Medical History:   Diagnosis Date    Adenomatous colon polyp     Aortic regurgitation     Aortic stenosis     Diverticulosis     Hemorrhoids     Last Assessed: 11/10/17    Hypertension     Serrated adenoma of colon     Visual disturbances     Vitamin D deficiency      Past Surgical History:   Procedure Laterality Date    COLONOSCOPY      COLONOSCOPY N/A 11/28/2017    Procedure: COLONOSCOPY with polypectomy, random bx, and hemorrhoid banding;  Surgeon: Aleta Lopez MD;  Location: [facility]; Service: General    COLONOSCOPY N/A 2/26/2018    Procedure: COLONOSCOPY;  Surgeon: Darek Marcus MD;  Location: [facility]; Service: Gastroenterology    MOUTH SURGERY      [place] COLONOSCOPY FLX [facility] W[facility] WHEN PFRMD N/A 11/29/2016    Procedure: COLONOSCOPY POSSIBLE BANDING;  Surgeon: Aleta Lopez MD;  Location: [facility]; Service: General     Family History   Problem Relation Age of Onset    Asthma Mother     Heart failure Mother         Congestive    Depression Mother     Alcohol abuse Father     Stroke Father         CVA    Thyroid disease Sister     Heart attack Brother         Myocardial Infarction    Depression Brother     Stroke Family         Syndrome     Social History     Socioeconomic History    Marital status: /Civil Union     Spouse name: Not on file    Number of children: Not on file    Years of education: Not on file    Highest education level: Not on file   Occupational History    Not on file   Social Needs    Financial resource strain: Not on file    Food insecurity     Worry: Not on file     Inability: Not on file   [employer] needs     Medical: Not on file     Non-medical: Not on file   Tobacco Use    Smoking status: Never Smoker    Smokeless tobacco: Never Used   Substance and Sexual Activity    Alcohol use: Yes     Comment: 2 beers daily   Per Allscripts: minimal usage    Drug use: No    Sexual activity: Not on file   Lifestyle    Physical activity     Days per week: Not on file     Minutes per session: Not on file    Stress: Not on file   Relationships    Social connections     Talks on phone: Not on file     Gets together: Not on file     Attends [employer] service: Not on file     Active member of club or organization: Not on file     Attends meetings of clubs or organizations: Not on file     Relationship status: Not on file    Intimate partner violence     Fear of current or ex partner: Not on file     Emotionally abused: Not on file     Physically abused: Not on file     Forced sexual activity: Not on file   Other Topics Concern    Not on file   Social History Narrative    Caffeine use: 3 cups coffe per day    Has smoke detectors    Uses safety equipment: seatbelts       Current Outpatient Medications:     aspirin 81 MG tablet, Take 81 mg by mouth daily, Disp: , Rfl:     atorvastatin (LIPITOR) 40 mg tablet, TAKE 1 TABLET DAILY, Disp: 90 tablet, Rfl: 3    Cholecalciferol (VITAMIN D-400 PO), Take by mouth, Disp: , Rfl:     docusate sodium (COLACE) 50 mg capsule, Take by mouth daily, Disp: , Rfl:     ketoconazole (NIZORAL) 2 % cream, as needed , Disp: , Rfl:     losartan (COZAAR) 100 MG tablet, TAKE 1 TABLET DAILY, Disp: 90 tablet, Rfl: 3    Multiple Vitamins-Minerals (CENTRUM SILVER PO), Take by mouth daily, Disp: , Rfl:     Omega-3 Fatty Acids (OMEGA 3 PO), Take by mouth daily  , Disp: , Rfl:     Review of Systems   Constitutional: Negative for appetite change, chills, fatigue, fever and unexpected weight change  Respiratory: Negative for cough, shortness of breath and wheezing  Cardiovascular: Negative for chest pain, palpitations and leg swelling  Gastrointestinal: Negative for abdominal pain, blood in stool, constipation, diarrhea, nausea and vomiting  Endocrine: Negative for cold intolerance and heat intolerance  Genitourinary: Negative for difficulty urinating, dysuria, frequency, hematuria and urgency  Musculoskeletal: Negative for arthralgias, joint swelling and myalgias  Skin: Negative for rash  Neurological: Negative for dizziness, syncope, weakness, numbness and headaches  Hematological: Negative for adenopathy  Psychiatric/Behavioral: Negative for dysphoric mood and sleep disturbance   The patient is not nervous/anxious  Objective:    Vitals:    05/21/21 1111   BP: 140/82   Pulse: 56   Resp: 16   Weight: 81 6 kg (179 lb 12 8 oz)   Height: 5' 7 75" (1 721 m)     Wt Readings from Last 3 Encounters:   05/21/21 81 6 kg (179 lb 12 8 oz)   03/02/21 81 4 kg (179 lb 6 4 oz)   11/18/20 81 2 kg (179 lb)     BP Readings from Last 3 Encounters:   05/21/21 140/82   03/02/21 136/90   11/18/20 138/76        Physical Exam  Constitutional:       General: He is not in acute distress  Appearance: He is well-developed  Neck:      Musculoskeletal: Neck supple  Thyroid: No thyromegaly  Cardiovascular:      Rate and Rhythm: Normal rate and regular rhythm  Heart sounds: Normal heart sounds  No murmur  Pulmonary:      Effort: Pulmonary effort is normal  No respiratory distress  Breath sounds: Normal breath sounds  Abdominal:      General: There is no distension  Palpations: Abdomen is soft  There is no mass  Tenderness: There is no abdominal tenderness  Lymphadenopathy:      Cervical: No cervical adenopathy  Neurological:      Mental Status: He is alert and oriented to person, place, and time  Psychiatric:         Behavior: Behavior normal          Thought Content:  Thought content normal         Lab Results   Component Value Date    CHOLESTEROL 204 (H) 05/03/2021    CHOLESTEROL 210 (H) 11/09/2020    CHOLESTEROL 199 05/12/2020     Lab Results   Component Value Date    HDL 45 05/03/2021    HDL 46 11/09/2020    HDL 45 05/12/2020     Lab Results   Component Value Date    TRIG 101 05/03/2021    TRIG 123 11/09/2020    TRIG 192 (H) 05/12/2020     Lab Results   Component Value Date    LDLCALC 139 (H) 05/03/2021     Lab Results   Component Value Date     09/01/2015    SODIUM 136 05/03/2021    K 4 4 05/03/2021     05/03/2021    CO2 26 05/03/2021    ANIONGAP 9 09/01/2015    AGAP 4 05/03/2021    BUN 15 05/03/2021    CREATININE 0 78 05/03/2021    GLUC 106 01/29/2018    GLUF 97 05/03/2021    CALCIUM 9 6 05/03/2021    AST 23 05/03/2021    ALT 38 05/03/2021    ALKPHOS 78 05/03/2021    PROT 7 5 09/01/2015    TP 7 5 05/03/2021    BILITOT 0 57 09/01/2015    TBILI 0 63 05/03/2021    EGFR 92 05/03/2021     Lab Results   Component Value Date    WBC 5 59 05/03/2021    HGB 15 4 05/03/2021    HCT 46 6 05/03/2021    MCV 94 05/03/2021     05/03/2021     Lab Results   Component Value Date    JYK1LDVJVNUL 3 350 05/03/2021     Lab Results   Component Value Date    PSA 2 3 11/09/2020    PSA 2 6 09/17/2019    PSA 2 2 09/10/2018

## 2021-06-15 ENCOUNTER — CLINICAL SUPPORT (OUTPATIENT)
Dept: FAMILY MEDICINE CLINIC | Facility: CLINIC | Age: 71
End: 2021-06-15
Payer: MEDICARE

## 2021-06-15 ENCOUNTER — TELEPHONE (OUTPATIENT)
Dept: FAMILY MEDICINE CLINIC | Facility: CLINIC | Age: 71
End: 2021-06-15

## 2021-06-15 DIAGNOSIS — S61.012A LACERATION OF LEFT THUMB WITHOUT FOREIGN BODY, NAIL DAMAGE STATUS UNSPECIFIED, INITIAL ENCOUNTER: Primary | ICD-10-CM

## 2021-06-15 PROCEDURE — 90471 IMMUNIZATION ADMIN: CPT

## 2021-06-15 PROCEDURE — 90715 TDAP VACCINE 7 YRS/> IM: CPT

## 2021-07-06 ENCOUNTER — PREP FOR PROCEDURE (OUTPATIENT)
Dept: GASTROENTEROLOGY | Facility: CLINIC | Age: 71
End: 2021-07-06

## 2021-07-06 DIAGNOSIS — Z86.010 HISTORY OF COLON POLYPS: Primary | ICD-10-CM

## 2021-07-06 NOTE — TELEPHONE ENCOUNTER
Recall colon on 9/1/21 with Dr Darlyn Valentine at [facility]  Sent message to the doctor in regards to what prep and patient doesn't get cleaned out until very late

## 2021-07-08 RX ORDER — SODIUM, POTASSIUM,MAG SULFATES 17.5-3.13G
SOLUTION, RECONSTITUTED, ORAL ORAL
OUTPATIENT
Start: 2021-07-08

## 2021-07-08 NOTE — TELEPHONE ENCOUNTER
Suprep is not covered  Can we give him a sample instead due to Dr Valderrama Reveal request for this specific prep?  Thanks

## 2021-07-08 NOTE — TELEPHONE ENCOUNTER
Dr Kendall Devine suggested doing suprep and miralax 1x daily everyday for 5 days prior to the colonoscopy (1 scoop with 8 oz of water)  Left a detailed message for the patient and have mailed out Suprep prep instructions to the patient  Will have script sent into the pharmacy

## 2021-08-24 ENCOUNTER — TELEPHONE (OUTPATIENT)
Dept: GASTROENTEROLOGY | Facility: MEDICAL CENTER | Age: 71
End: 2021-08-24

## 2021-08-31 ENCOUNTER — TELEPHONE (OUTPATIENT)
Dept: GASTROENTEROLOGY | Facility: MEDICAL CENTER | Age: 71
End: 2021-08-31

## 2021-09-01 ENCOUNTER — ANESTHESIA (OUTPATIENT)
Dept: GASTROENTEROLOGY | Facility: MEDICAL CENTER | Age: 71
End: 2021-09-01

## 2021-09-01 ENCOUNTER — HOSPITAL ENCOUNTER (OUTPATIENT)
Dept: GASTROENTEROLOGY | Facility: MEDICAL CENTER | Age: 71
Setting detail: OUTPATIENT SURGERY
Discharge: HOME | End: 2021-09-01
Attending: INTERNAL MEDICINE
Payer: MEDICARE

## 2021-09-01 ENCOUNTER — ANESTHESIA EVENT (OUTPATIENT)
Dept: GASTROENTEROLOGY | Facility: MEDICAL CENTER | Age: 71
End: 2021-09-01

## 2021-09-01 VITALS
RESPIRATION RATE: 20 BRPM | OXYGEN SATURATION: 97 % | WEIGHT: 174 LBS | HEART RATE: 60 BPM | DIASTOLIC BLOOD PRESSURE: 74 MMHG | BODY MASS INDEX: 27.31 KG/M2 | HEIGHT: 67 IN | TEMPERATURE: 97.4 F | SYSTOLIC BLOOD PRESSURE: 158 MMHG

## 2021-09-01 DIAGNOSIS — Z86.010 HISTORY OF COLON POLYPS: ICD-10-CM

## 2021-09-01 PROCEDURE — 88305 TISSUE EXAM BY PATHOLOGIST: CPT | Performed by: PATHOLOGY

## 2021-09-01 PROCEDURE — 45385 COLONOSCOPY W/LESION REMOVAL: CPT | Performed by: INTERNAL MEDICINE

## 2021-09-01 RX ORDER — SODIUM CHLORIDE 9 MG/ML
125 INJECTION, SOLUTION INTRAVENOUS CONTINUOUS
Status: DISCONTINUED | OUTPATIENT
Start: 2021-09-01 | End: 2021-09-05 | Stop reason: HOSPADM

## 2021-09-01 RX ORDER — LIDOCAINE HYDROCHLORIDE 20 MG/ML
INJECTION, SOLUTION EPIDURAL; INFILTRATION; INTRACAUDAL; PERINEURAL AS NEEDED
Status: DISCONTINUED | OUTPATIENT
Start: 2021-09-01 | End: 2021-09-01

## 2021-09-01 RX ORDER — PROPOFOL 10 MG/ML
INJECTION, EMULSION INTRAVENOUS AS NEEDED
Status: DISCONTINUED | OUTPATIENT
Start: 2021-09-01 | End: 2021-09-01

## 2021-09-01 RX ADMIN — PROPOFOL 20 MG: 10 INJECTION, EMULSION INTRAVENOUS at 13:08

## 2021-09-01 RX ADMIN — PROPOFOL 20 MG: 10 INJECTION, EMULSION INTRAVENOUS at 12:53

## 2021-09-01 RX ADMIN — PROPOFOL 20 MG: 10 INJECTION, EMULSION INTRAVENOUS at 12:51

## 2021-09-01 RX ADMIN — PROPOFOL 20 MG: 10 INJECTION, EMULSION INTRAVENOUS at 12:54

## 2021-09-01 RX ADMIN — PROPOFOL 30 MG: 10 INJECTION, EMULSION INTRAVENOUS at 13:00

## 2021-09-01 RX ADMIN — PROPOFOL 20 MG: 10 INJECTION, EMULSION INTRAVENOUS at 13:04

## 2021-09-01 RX ADMIN — LIDOCAINE HYDROCHLORIDE 100 MG: 20 INJECTION, SOLUTION EPIDURAL; INFILTRATION; INTRACAUDAL; PERINEURAL at 12:48

## 2021-09-01 RX ADMIN — PROPOFOL 100 MG: 10 INJECTION, EMULSION INTRAVENOUS at 12:48

## 2021-09-01 RX ADMIN — PROPOFOL 20 MG: 10 INJECTION, EMULSION INTRAVENOUS at 12:56

## 2021-09-01 RX ADMIN — PROPOFOL 20 MG: 10 INJECTION, EMULSION INTRAVENOUS at 13:09

## 2021-09-01 RX ADMIN — PROPOFOL 20 MG: 10 INJECTION, EMULSION INTRAVENOUS at 13:06

## 2021-09-01 RX ADMIN — PROPOFOL 30 MG: 10 INJECTION, EMULSION INTRAVENOUS at 13:02

## 2021-09-01 RX ADMIN — PROPOFOL 20 MG: 10 INJECTION, EMULSION INTRAVENOUS at 12:58

## 2021-09-01 RX ADMIN — SODIUM CHLORIDE 125 ML/HR: 0.9 INJECTION, SOLUTION INTRAVENOUS at 12:36

## 2021-09-01 NOTE — ANESTHESIA PREPROCEDURE EVALUATION
Procedure:  COLONOSCOPY    Relevant Problems   CARDIO   (+) Aortic regurgitation   (+) Essential hypertension   (+) Pure hypercholesterolemia      NEURO/PSYCH   (+) H/O amaurosis fugax 2016        Physical Exam    Airway    Mallampati score: III  TM Distance: >3 FB  Neck ROM: full     Dental       Cardiovascular  Rhythm: regular, Rate: normal,     Pulmonary  Breath sounds clear to auscultation,     Other Findings        Anesthesia Plan  ASA Score- 2     Anesthesia Type- IV sedation with anesthesia with ASA Monitors  Additional Monitors:   Airway Plan:           Plan Factors-Exercise tolerance (METS): >4 METS  Chart reviewed  Existing labs reviewed  Induction- intravenous  Postoperative Plan-     Informed Consent- Anesthetic plan and risks discussed with patient

## 2021-09-01 NOTE — DISCHARGE INSTRUCTIONS
Colonoscopy   WHAT YOU NEED TO KNOW:   A colonoscopy is a procedure to examine the inside of your colon (intestine) with a scope  Polyps or tissue growths may have been removed during your colonoscopy  It is normal to feel bloated and to have some abdominal discomfort  You should be passing gas  If you have hemorrhoids or you had polyps removed, you may have a small amount of bleeding  DISCHARGE INSTRUCTIONS:   Seek care immediately if:    You have sudden, severe abdominal pain   You have problems swallowing   You have a large amount of black, sticky bowel movements or blood in your bowel movements   You have sudden trouble breathing   You feel weak, lightheaded, or faint or your heart beats faster than normal for you  Contact your healthcare provider if:    You have a fever and chills   You have nausea or are vomiting   Your abdomen is bloated or feels full and hard   You have abdominal pain   You have black, sticky bowel movements or blood in your bowel movements   You have not had a bowel movement for 3 days after your procedure   You have rash or hives   You have questions or concerns about your procedure  Activity:    Do not lift, strain, or run for 24 hours after your procedure   Rest after your procedure  You have been given medicine to relax you  Do not drive or make important decisions until the day after your procedure  Return to your normal activity as directed   Relieve gas and discomfort from bloating by lying on your right side with a heating pad on your abdomen  You may need to take short walks to help the gas move out  Eat small meals until bloating is relieved  Follow up with your healthcare provider as directed: Write down your questions so you remember to ask them during your visits  If you take a blood thinner, please review the specific instructions from your endoscopist about when you should resume it   These can be found in the Recommendation and Your Medication list sections of this After Visit Summary  Colorectal Polyps   WHAT YOU NEED TO KNOW:   Colorectal polyps are small growths of tissue in the lining of the colon and rectum  Most polyps are hyperplastic polyps and are usually benign (noncancerous)  Certain types of polyps, called adenomatous polyps, may turn into cancer  DISCHARGE INSTRUCTIONS:   Follow up with your healthcare provider or gastroenterologist as directed: You may need to return for more tests, such as another colonoscopy  Write down your questions so you remember to ask them during your visits  Reduce your risk for colorectal polyps:   · Eat a variety of healthy foods:  Healthy foods include fruit, vegetables, whole-grain breads, low-fat dairy products, beans, lean meat, and fish  Ask if you need to be on a special diet  · Maintain a healthy weight:  Ask your healthcare provider if you need to lose weight and how much you need to lose  Ask for help with a weight loss program     · Exercise:  Begin to exercise slowly and do more as you get stronger  Talk with your healthcare provider before you start an exercise program      · Limit alcohol:  Your risk for polyps increases the more you drink  · Do not smoke: If you smoke, it is never too late to quit  Ask for information about how to stop  For support and more information:   · Mona Rabago ([facility]) [address], [place] [address]  Phone: [phone]  Web Address: www digestive  niddk nih gov    Contact your healthcare provider or gastroenterologist if:   · You have a fever  · You have chills, a cough, or feel weak and achy  · You have abdominal pain that does not go away or gets worse after you take medicine  · Your abdomen is swollen  · You are losing weight without trying  · You have questions or concerns about your condition or care      Seek care immediately or call 911 if:   · You have sudden shortness of breath  · You have a fast heart rate, fast breathing, or are too dizzy to stand up  · You have severe abdominal pain  · You see blood in your bowel movement  © Copyright [address] Information is for End User's use only and may not be sold, redistributed or otherwise used for commercial purposes  All illustrations and images included in CareNotes® are the copyrighted property of A D A M , Inc  or [facility] Patricia Peña   The above information is an  only  It is not intended as medical advice for individual conditions or treatments  Talk to your doctor, nurse or pharmacist before following any medical regimen to see if it is safe and effective for you  Diverticulosis   WHAT YOU NEED TO KNOW:   What is diverticulosis? Diverticulosis is a condition that causes small pockets called diverticula to form in your intestine  These pockets make it difficult for bowel movements to pass through your digestive system  What causes diverticulosis? Diverticula form when muscles have to work hard to move bowel movements through the intestine  The force causes bulges to form at weak areas in the intestine  This may happen if you eat foods that are low in fiber  Fiber helps give your bowel movements more bulk so they are larger and easier to move through your colon  The following may increase your risk of diverticulosis:  · A history of constipation    · Age 36 or older    · Obesity    · Lack of exercise    What are the signs and symptoms of diverticulosis? Diverticulosis usually does not cause any signs or symptoms  It may cause any of the following in some people:  · Pain or discomfort in your lower abdomen    · Abdominal bloating    · Constipation or diarrhea    How is diverticulosis diagnosed? Your healthcare provider will examine you and ask about your bowel movements, diet, and symptoms   He or she will also ask about any medical conditions you have or medicines you take  You may need any of the following:  · Blood tests  may be done to check for signs of inflammation  · A barium enema  is an x-ray of your colon that may show diverticula  A tube is put into your anus, and a liquid called barium is put through the tube  Barium is used so that healthcare providers can see your colon more clearly  · Flexible sigmoidoscopy  is a test to look for any changes in your lower intestines and rectum  It may also show the cause of any bleeding or pain  A soft, bendable tube with a light on the end will be put into your anus  It will then be moved forward into your intestine  · A colonoscopy  is used to look at your whole colon  A scope (long bendable tube with a light on the end) is used to take pictures  This test may show diverticula  · A CT scan , or CAT scan, may show diverticula  You may be given contrast liquid before the scan  Tell the healthcare provider if you have ever had an allergic reaction to contrast liquid  How is diverticulosis managed? The goal of treatment is to manage any symptoms you have and prevent other problems such as diverticulitis  Diverticulitis is swelling or infection of the diverticula  Your healthcare provider may recommend any of the following:  · Eat a variety of high-fiber foods  High-fiber foods help you have regular bowel movements  High-fiber foods include cooked beans, fruits, vegetables, and some cereals  Most adults need 25 to 35 grams of fiber each day  Your healthcare provider may recommend that you have more  Ask your healthcare provider how much fiber you need  Increase fiber slowly  You may have abdominal discomfort, bloating, and gas if you add fiber to your diet too quickly  You may need to take a fiber supplement if you are not getting enough fiber from food  · Medicines  to soften your bowel movements may be given   You may also need medicines to treat symptoms such as bloating and pain  · Drink liquids as directed  You may need to drink 2 to 3 liters (8 to 12 cups) of liquids every day  Ask your healthcare provider how much liquid to drink each day and which liquids are best for you  · Apply heat  on your abdomen for 20 to 30 minutes every 2 hours for as many days as directed  Heat helps decrease pain and muscle spasms  How can I help prevent diverticulitis or other symptoms? The following may help decrease your risk for diverticulitis or symptoms, such as bleeding  Talk to your provider about these or other things you can do to prevent problems that may occur with diverticulosis  · Exercise regularly  Ask your healthcare provider about the best exercise plan for you  Exercise can help you have regular bowel movements  Get 30 minutes of exercise on most days of the week  · Maintain a healthy weight  Ask your healthcare provider how much you should weigh  Ask him or her to help you create a weight loss plan if you are overweight  · Do not smoke  Nicotine and other chemicals in cigarettes increase your risk for diverticulitis  Ask your healthcare provider for information if you currently smoke and need help to quit  E-cigarettes or smokeless tobacco still contain nicotine  Talk to your healthcare provider before you use these products  · Ask your healthcare provider if it is safe to take NSAIDs  NSAIDs may increase your risk of diverticulitis  When should I seek immediate care? · You have severe pain on the left side of your lower abdomen  · Your bowel movements are bright or dark red  When should I contact my healthcare provider? · You have a fever and chills  · You feel dizzy or lightheaded  · You have nausea, or you are vomiting  · You have a change in your bowel movements  · You have questions or concerns about your condition or care  CARE AGREEMENT:   You have the right to help plan your care   Learn about your health condition and how it may be treated  Discuss treatment options with your healthcare providers to decide what care you want to receive  You always have the right to refuse treatment  The above information is an  only  It is not intended as medical advice for individual conditions or treatments  Talk to your doctor, nurse or pharmacist before following any medical regimen to see if it is safe and effective for you  © Copyright [employer] 2021 Information is for End User's use only and may not be sold, redistributed or otherwise used for commercial purposes   All illustrations and images included in CareNotes® are the copyrighted property of A D A M , Inc  or [address] [place] [address]

## 2021-09-01 NOTE — H&P
History and Physical - [facility]  Kailey Brooks 72 y o  male MRN: [id number]                  HPI: Kailey Brooks is a 72y o  year old male who presents for colonoscopy for history of colon polyps  REVIEW OF SYSTEMS: Per the HPI, and otherwise unremarkable  Historical Information   Past Medical History:   Diagnosis Date    Adenomatous colon polyp     Aortic regurgitation     Aortic stenosis     Diverticulosis     Hemorrhoids     Last Assessed: 11/10/17    Hypertension     Serrated adenoma of colon     Visual disturbances     Vitamin D deficiency      Past Surgical History:   Procedure Laterality Date    COLONOSCOPY      COLONOSCOPY N/A 11/28/2017    Procedure: COLONOSCOPY with polypectomy, random bx, and hemorrhoid banding;  Surgeon: Paris Taveras MD;  Location: [facility]; Service: General    COLONOSCOPY N/A 2/26/2018    Procedure: COLONOSCOPY;  Surgeon: Rosamaria Quiros MD;  Location: [facility]; Service: Gastroenterology    MOUTH SURGERY      [facility] N/A 11/29/2016    Procedure: COLONOSCOPY POSSIBLE BANDING;  Surgeon: Paris Taveras MD;  Location: [facility]; Service: General     Social History   Social History     Substance and Sexual Activity   Alcohol Use Yes    Comment: 2 beers daily   Per [employer]: minimal usage     Social History     Substance and Sexual Activity   Drug Use No     Social History     Tobacco Use   Smoking Status Never Smoker   Smokeless Tobacco Never Used     Family History   Problem Relation Age of Onset    Asthma Mother     Heart failure Mother         Congestive    Depression Mother     Alcohol abuse Father     Stroke Father         CVA    Thyroid disease Sister     Heart attack Brother         Myocardial Infarction    Depression Brother     Stroke Family         Syndrome       Meds/Allergies       Current Outpatient Medications:     aspirin 81 MG tablet    atorvastatin (LIPITOR) 40 mg tablet    Cholecalciferol (VITAMIN D-400 PO)    docusate sodium (COLACE) 50 mg capsule    ketoconazole (NIZORAL) 2 % cream    losartan (COZAAR) 100 MG tablet    Multiple Vitamins-Minerals (CENTRUM SILVER PO)    Omega-3 Fatty Acids (OMEGA 3 PO)    Current Facility-Administered Medications:     sodium chloride 0 9 % infusion, 125 mL/hr, Intravenous, Continuous    Allergies   Allergen Reactions    Sulfa Antibiotics Rash     redness       Objective     There were no vitals taken for this visit  PHYSICAL EXAM    Gen: NAD  Head: NCAT  CV: RRR  CHEST: Clear  ABD: soft, NT/ND  EXT: no edema      ASSESSMENT/PLAN:  This is a 72y o  year old male here for colonoscopy and he is stable and optimized for his procedure

## 2021-10-19 DIAGNOSIS — E78.5 HYPERLIPIDEMIA, UNSPECIFIED HYPERLIPIDEMIA TYPE: ICD-10-CM

## 2021-10-19 RX ORDER — ATORVASTATIN CALCIUM 40 MG/1
TABLET, FILM COATED ORAL
Qty: 90 TABLET | Refills: 3 | Status: SHIPPED | OUTPATIENT
Start: 2021-10-19 | End: 2022-02-01 | Stop reason: SDUPTHER

## 2021-11-01 ENCOUNTER — APPOINTMENT (OUTPATIENT)
Dept: LAB | Facility: CLINIC | Age: 71
End: 2021-11-01
Payer: MEDICARE

## 2021-11-01 DIAGNOSIS — Z12.5 SCREENING FOR PROSTATE CANCER: ICD-10-CM

## 2021-11-01 DIAGNOSIS — E78.5 HYPERLIPIDEMIA, UNSPECIFIED HYPERLIPIDEMIA TYPE: ICD-10-CM

## 2021-11-01 DIAGNOSIS — E03.8 SUBCLINICAL HYPOTHYROIDISM: ICD-10-CM

## 2021-11-01 DIAGNOSIS — I10 ESSENTIAL HYPERTENSION: ICD-10-CM

## 2021-11-01 LAB
ALBUMIN SERPL BCP-MCNC: 3.7 G/DL (ref 3.5–5)
ALP SERPL-CCNC: 86 U/L (ref 46–116)
ALT SERPL W P-5'-P-CCNC: 34 U/L (ref 12–78)
ANION GAP SERPL CALCULATED.3IONS-SCNC: 4 MMOL/L (ref 4–13)
AST SERPL W P-5'-P-CCNC: 21 U/L (ref 5–45)
BILIRUB SERPL-MCNC: 0.74 MG/DL (ref 0.2–1)
BUN SERPL-MCNC: 12 MG/DL (ref 5–25)
CALCIUM SERPL-MCNC: 9.7 MG/DL (ref 8.3–10.1)
CHLORIDE SERPL-SCNC: 107 MMOL/L (ref 100–108)
CHOLEST SERPL-MCNC: 187 MG/DL (ref 50–200)
CO2 SERPL-SCNC: 25 MMOL/L (ref 21–32)
CREAT SERPL-MCNC: 0.78 MG/DL (ref 0.6–1.3)
GFR SERPL CREATININE-BSD FRML MDRD: 92 ML/MIN/1.73SQ M
GLUCOSE P FAST SERPL-MCNC: 93 MG/DL (ref 65–99)
HDLC SERPL-MCNC: 44 MG/DL
LDLC SERPL CALC-MCNC: 125 MG/DL (ref 0–100)
POTASSIUM SERPL-SCNC: 4.9 MMOL/L (ref 3.5–5.3)
PROT SERPL-MCNC: 7.7 G/DL (ref 6.4–8.2)
PSA SERPL-MCNC: 2.4 NG/ML (ref 0–4)
SODIUM SERPL-SCNC: 136 MMOL/L (ref 136–145)
TRIGL SERPL-MCNC: 91 MG/DL
TSH SERPL DL<=0.05 MIU/L-ACNC: 3.62 UIU/ML (ref 0.36–3.74)

## 2021-11-01 PROCEDURE — 80061 LIPID PANEL: CPT

## 2021-11-01 PROCEDURE — 84443 ASSAY THYROID STIM HORMONE: CPT

## 2021-11-01 PROCEDURE — 80053 COMPREHEN METABOLIC PANEL: CPT

## 2021-11-01 PROCEDURE — 36415 COLL VENOUS BLD VENIPUNCTURE: CPT

## 2021-11-01 PROCEDURE — G0103 PSA SCREENING: HCPCS

## 2021-11-03 ENCOUNTER — HOSPITAL ENCOUNTER (OUTPATIENT)
Dept: NON INVASIVE DIAGNOSTICS | Facility: CLINIC | Age: 71
Discharge: HOME | End: 2021-11-03
Payer: MEDICARE

## 2021-11-03 VITALS
HEART RATE: 53 BPM | DIASTOLIC BLOOD PRESSURE: 74 MMHG | HEIGHT: 67 IN | BODY MASS INDEX: 27.31 KG/M2 | SYSTOLIC BLOOD PRESSURE: 158 MMHG | WEIGHT: 174 LBS

## 2021-11-03 DIAGNOSIS — I10 ESSENTIAL HYPERTENSION: ICD-10-CM

## 2021-11-03 DIAGNOSIS — I35.1 NONRHEUMATIC AORTIC VALVE INSUFFICIENCY: ICD-10-CM

## 2021-11-03 LAB
AORTIC ROOT: 3.6 CM
APICAL FOUR CHAMBER EJECTION FRACTION: 69 %
AV PEAK GRADIENT: 12 MMHG
AV REGURGITATION PRESSURE HALF TIME: 0.4 MS
E WAVE DECELERATION TIME: 234 MS
FRACTIONAL SHORTENING: 47 % (ref 28–44)
INTERVENTRICULAR SEPTUM IN DIASTOLE (PARASTERNAL SHORT AXIS VIEW): 1.3 CM
LEFT INTERNAL DIMENSION IN SYSTOLE: 2.4 CM (ref 2.1–4)
LEFT VENTRICULAR INTERNAL DIMENSION IN DIASTOLE: 4.5 CM (ref 4.56–6.79)
LEFT VENTRICULAR POSTERIOR WALL IN END DIASTOLE: 1.1 CM
LEFT VENTRICULAR STROKE VOLUME: 73 ML
MV E'TISSUE VEL-SEP: 9 CM/S
MV PEAK A VEL: 0.6 M/S
MV PEAK E VEL: 50 CM/S
MV STENOSIS PRESSURE HALF TIME: 0 MS
PULMONARY REGURGITATION LATE DIASTOLIC VELOCITY: 0.02 M/S
RIGHT VENTRICLE ID DIMENSION: 3.8 CM
SL CV AV DECELERATION TIME RETROGRADE: 1380 MS
SL CV AV PEAK GRADIENT RETROGRADE: 53 MMHG
SL CV LV EF: 65
SL CV PED ECHO LEFT VENTRICLE DIASTOLIC VOLUME (MOD BIPLANE) 2D: 93 ML
SL CV PED ECHO LEFT VENTRICLE SYSTOLIC VOLUME (MOD BIPLANE) 2D: 20 ML
TR PEAK VELOCITY: 1.9 M/S
TRICUSPID VALVE PEAK REGURGITATION VELOCITY: 1.86 M/S
TRICUSPID VALVE S': 0.5 CM/S
TV PEAK GRADIENT: 14 MMHG
Z-SCORE OF LEFT VENTRICULAR DIMENSION IN END SYSTOLE: -2.11

## 2021-11-03 PROCEDURE — 93306 TTE W/DOPPLER COMPLETE: CPT | Performed by: INTERNAL MEDICINE

## 2021-11-03 PROCEDURE — 93308 TTE F-UP OR LMTD: CPT

## 2021-11-22 ENCOUNTER — OFFICE VISIT (OUTPATIENT)
Dept: FAMILY MEDICINE CLINIC | Facility: CLINIC | Age: 71
End: 2021-11-22
Payer: MEDICARE

## 2021-11-22 VITALS
RESPIRATION RATE: 16 BRPM | DIASTOLIC BLOOD PRESSURE: 80 MMHG | SYSTOLIC BLOOD PRESSURE: 122 MMHG | HEIGHT: 67 IN | HEART RATE: 56 BPM | WEIGHT: 179.8 LBS | BODY MASS INDEX: 28.22 KG/M2

## 2021-11-22 DIAGNOSIS — E03.8 SUBCLINICAL HYPOTHYROIDISM: ICD-10-CM

## 2021-11-22 DIAGNOSIS — E78.5 HYPERLIPIDEMIA, UNSPECIFIED HYPERLIPIDEMIA TYPE: ICD-10-CM

## 2021-11-22 DIAGNOSIS — N39.8 VOIDING DYSFUNCTION: ICD-10-CM

## 2021-11-22 DIAGNOSIS — I10 ESSENTIAL HYPERTENSION: Primary | ICD-10-CM

## 2021-11-22 DIAGNOSIS — I35.1 NONRHEUMATIC AORTIC VALVE INSUFFICIENCY: ICD-10-CM

## 2021-11-22 PROCEDURE — 99214 OFFICE O/P EST MOD 30 MIN: CPT | Performed by: FAMILY MEDICINE

## 2021-12-07 ENCOUNTER — OFFICE VISIT (OUTPATIENT)
Dept: CARDIAC SURGERY | Facility: CLINIC | Age: 71
End: 2021-12-07
Payer: MEDICARE

## 2021-12-07 VITALS
HEIGHT: 67 IN | DIASTOLIC BLOOD PRESSURE: 78 MMHG | BODY MASS INDEX: 28.25 KG/M2 | HEART RATE: 56 BPM | WEIGHT: 180 LBS | SYSTOLIC BLOOD PRESSURE: 138 MMHG

## 2021-12-07 DIAGNOSIS — I35.1 AORTIC VALVE INSUFFICIENCY, ETIOLOGY OF CARDIAC VALVE DISEASE UNSPECIFIED: Primary | ICD-10-CM

## 2021-12-07 DIAGNOSIS — I10 ESSENTIAL HYPERTENSION: ICD-10-CM

## 2021-12-07 DIAGNOSIS — Q23.1 BICUSPID AORTIC VALVE: ICD-10-CM

## 2021-12-07 PROCEDURE — 93000 ELECTROCARDIOGRAM COMPLETE: CPT | Performed by: INTERNAL MEDICINE

## 2021-12-07 PROCEDURE — 99214 OFFICE O/P EST MOD 30 MIN: CPT | Performed by: INTERNAL MEDICINE

## 2022-01-03 ENCOUNTER — OFFICE VISIT (OUTPATIENT)
Dept: UROLOGY | Facility: HOSPITAL | Age: 72
End: 2022-01-03
Payer: MEDICARE

## 2022-01-03 VITALS
WEIGHT: 183 LBS | BODY MASS INDEX: 28.72 KG/M2 | HEIGHT: 67 IN | SYSTOLIC BLOOD PRESSURE: 134 MMHG | DIASTOLIC BLOOD PRESSURE: 76 MMHG

## 2022-01-03 DIAGNOSIS — R39.15 URGENCY OF URINATION: Primary | ICD-10-CM

## 2022-01-03 DIAGNOSIS — N39.8 VOIDING DYSFUNCTION: ICD-10-CM

## 2022-01-03 LAB
POST-VOID RESIDUAL VOLUME, ML POC: 67 ML
SL AMB  POCT GLUCOSE, UA: NORMAL
SL AMB LEUKOCYTE ESTERASE,UA: NORMAL
SL AMB POCT BILIRUBIN,UA: NORMAL
SL AMB POCT BLOOD,UA: NORMAL
SL AMB POCT CLARITY,UA: CLEAR
SL AMB POCT COLOR,UA: YELLOW
SL AMB POCT KETONES,UA: NORMAL
SL AMB POCT NITRITE,UA: NORMAL
SL AMB POCT PH,UA: 6
SL AMB POCT SPECIFIC GRAVITY,UA: 1.01
SL AMB POCT URINE PROTEIN: NORMAL
SL AMB POCT UROBILINOGEN: 0.2

## 2022-01-03 PROCEDURE — 99203 OFFICE O/P NEW LOW 30 MIN: CPT | Performed by: NURSE PRACTITIONER

## 2022-01-03 PROCEDURE — 81002 URINALYSIS NONAUTO W/O SCOPE: CPT | Performed by: NURSE PRACTITIONER

## 2022-01-03 PROCEDURE — 51798 US URINE CAPACITY MEASURE: CPT | Performed by: NURSE PRACTITIONER

## 2022-01-03 RX ORDER — TAMSULOSIN HYDROCHLORIDE 0.4 MG/1
0.4 CAPSULE ORAL
Qty: 30 CAPSULE | Refills: 3 | Status: SHIPPED | OUTPATIENT
Start: 2022-01-03 | End: 2022-04-25

## 2022-01-03 NOTE — PROGRESS NOTES
01/03/22    Oliver Taveras   1950   [id number]     Assessment  1 Urinary urgency  2 Nocturia    Discussion/Plan  1 Urinary urgency  2 Nocturia   PVR 67   AUA 12    RUSS: 40-45 g prostate, smooth, non tender, no nodules   Trial Tamsulosin 0 4 mg once daily - reviewed side effects and mechanism of action    Encouraged patient to adjust his fluid intake, hydrate primarily with water and avoid bladder irritants    Patient will return in 8 weeks for PVR/AUA evaluation  All questions answered at this time  Subjective  HPI   Iram Walters is a 80year old male who presents to the office in consultation for evaluation of urinary urgency and frequency  He is referred to us by his PCP, Dr Javi Lima  Patient reports this has been an ongoing and worsening issue  He is up every 2-3 hours overnight to urinate  He denies history of sleep apnea  He consumes 2 cups of coffee in the morning and 2 beers in the afternoon as well as 16 oz water in the evening  He denies constipation issues  No reports of prostate cancer in family  He is a lifetime nonsmoker although he states he has been exposed to second hand smoke     PMH: HTN, hyperlipidemia, hypothyroidism, aortic regurgitation, colon adenoma, daily ETOH     Component      Latest Ref Rng & Units 9/11/2017 9/10/2018 9/17/2019 11/9/2020           8:09 AM  8:30 AM  8:02 AM  7:53 AM   PSA, Total      0 0 - 4 0 ng/mL 1 6 2 2 2 6 2 3     Component      Latest Ref Rng & Units 11/1/2021           8:13 AM   PSA, Total      0 0 - 4 0 ng/mL 2 4       Review of Systems - History obtained from chart review and the patient  General ROS: negative  Psychological ROS: negative  Endocrine ROS: negative  Breast ROS: negative for breast lumps  Respiratory ROS: no cough, shortness of breath, or wheezing  Cardiovascular ROS: negative  Gastrointestinal ROS: no abdominal pain, change in bowel habits, or black or bloody stools  Genito-Urinary ROS: positive for - urinary frequency/urgency, nocturia  Musculoskeletal ROS: negative  Neurological ROS: no TIA or stroke symptoms  Dermatological ROS: negative       Objective  Physical Exam  Vitals and nursing note reviewed  Constitutional:       General: He is awake  He is not in acute distress  Appearance: Normal appearance  He is well-developed, well-groomed and normal weight  He is not ill-appearing, toxic-appearing or diaphoretic  Pulmonary:      Effort: Pulmonary effort is normal    Abdominal:      Tenderness: There is no right CVA tenderness or left CVA tenderness  Musculoskeletal:         General: Normal range of motion  Cervical back: Normal range of motion and neck supple  Skin:     General: Skin is warm  Neurological:      General: No focal deficit present  Mental Status: He is alert and oriented to person, place, and time  Mental status is at baseline  Psychiatric:         Attention and Perception: Attention normal          Mood and Affect: Mood normal          Speech: Speech normal          Behavior: Behavior normal  Behavior is cooperative  Thought Content: Thought content normal          Cognition and Memory: Cognition normal          Judgment: Judgment normal          AUA SYMPTOM SCORE      Most Recent Value   AUA SYMPTOM SCORE    How often have you had a sensation of not emptying your bladder completely after you finished urinating? 2   How often have you had to urinate again less than two hours after you finished urinating? 3   How often have you found you stopped and started again several times when you urinate? 1   How often have you found it difficult to postpone urination? 2   How often have you had a weak urinary stream? 1   How often have you had to push or strain to begin urination? 0   How many times did you most typically get up to urinate from the time you went to bed at night until the time you got up in the morning?  3   Quality of Life: If you were to spend the rest of your life with your urinary condition just the way it is now, how would you feel about that? [facility] [address], CRNP

## 2022-02-01 DIAGNOSIS — E78.5 HYPERLIPIDEMIA, UNSPECIFIED HYPERLIPIDEMIA TYPE: ICD-10-CM

## 2022-02-01 DIAGNOSIS — I10 ESSENTIAL HYPERTENSION: ICD-10-CM

## 2022-02-01 RX ORDER — ATORVASTATIN CALCIUM 40 MG/1
40 TABLET, FILM COATED ORAL DAILY
Qty: 90 TABLET | Refills: 3 | Status: SHIPPED | OUTPATIENT
Start: 2022-02-01

## 2022-02-01 RX ORDER — LOSARTAN POTASSIUM 100 MG/1
100 TABLET ORAL DAILY
Qty: 90 TABLET | Refills: 3 | Status: SHIPPED | OUTPATIENT
Start: 2022-02-01

## 2022-02-28 NOTE — PROGRESS NOTES
02/28/22    Jg Mcclain   1950   [id number]     Assessment  1 Lower urinary tract symptoms  2 Nocturia  3 Prostate cancer screening  4 Insomnia     Discussion/Plan  1 Lower urinary tract symptoms  2 Nocturia              AUA 6 (previously 12)              Continue Tamsulosin 0 4 mg once daily, refills provided               Encouraged patient to adjust his fluid intake, hydrate primarily with water and avoid bladder irritants  3 Prostate cancer screening    11/1/21 PSA 2 4   RUSS: performed at previous appt  40-45 g prostate, smooth, non tender, no nodules   Discontinue prostate cancer screening per AUA guidelines - patient agreeable  4 Insomnia   5 mg Ambien PRN at HS- follow up with PCP      Patient will return in 1 year or sooner if needed  Patient will follow up with PCP regarding insomnia and sleep disturbance  All questions answered at this time  Subjective  HPI   Jakub Zarco is a 80year old male who returns to the office for follow up evaluation of lower urinary tract symptoms including: urgency, frequency, and nocturia  Patient previously reported getting up every 2-3 hours overnight to urinate  He denies history of sleep apnea  He consumes 2 cups of coffee in the morning and 2 beers in the afternoon as well as 16 oz water in the evening  He denies constipation issues  No reports of prostate cancer in family  He is a lifetime nonsmoker although he states he has been exposed to second hand smoke  He was started on Tamsulosin 0 4 mg once daily at previous visit and he reports his symptoms have been reduced by 50%  He is experiencing insomnia and difficulty staying asleep  He has tried over-the-counter antihistamines and melatonin  He is extremely frustrated by his sleep pattern and it is affecting his daily life  He states he falls asleep and wakes up within 2 hours     PMH: HTN, hyperlipidemia, hypothyroidism, aortic regurgitation, colon adenoma, daily ETOH     Component      Latest Ref Rng & Units 9/11/2017 9/10/2018 9/17/2019 11/9/2020           8:09 AM  8:30 AM  8:02 AM  7:53 AM   PSA, Total      0 0 - 4 0 ng/mL 1 6 2 2 2 6 2 3     Component      Latest Ref Rng & Units 11/1/2021           8:13 AM   PSA, Total      0 0 - 4 0 ng/mL 2 4       Review of Systems - History obtained from chart review and the patient  General ROS: positive for - sleep disturbance   Psychological ROS: negative  Endocrine ROS: negative  Breast ROS: negative for breast lumps  Respiratory ROS: no cough, shortness of breath, or wheezing  Cardiovascular ROS: negative  Gastrointestinal ROS: negative  Genito-Urinary ROS: positive for - nocturia  Musculoskeletal ROS: negative  Neurological ROS: no TIA or stroke symptoms  Dermatological ROS: negative     Objective  Physical Exam  Vitals and nursing note reviewed  Constitutional:       General: He is awake  He is not in acute distress  Appearance: Normal appearance  He is well-developed, well-groomed and normal weight  He is not ill-appearing, toxic-appearing or diaphoretic  Pulmonary:      Effort: Pulmonary effort is normal    Abdominal:      Tenderness: There is no right CVA tenderness or left CVA tenderness  Musculoskeletal:         General: Normal range of motion  Cervical back: Normal range of motion and neck supple  Skin:     General: Skin is warm  Neurological:      General: No focal deficit present  Mental Status: He is alert and oriented to person, place, and time  Mental status is at baseline  Psychiatric:         Attention and Perception: Attention normal          Mood and Affect: Mood normal          Speech: Speech normal          Behavior: Behavior normal  Behavior is cooperative  Thought Content:  Thought content normal          Cognition and Memory: Cognition normal          Judgment: Judgment normal            Garald Apley, CRNP     I have spent 15 minutes with Patient  today in which greater than 50% of this time was spent in counseling/coordination of care regarding Diagnostic results, Risks and benefits of tx options, Intructions for management, Patient and family education, Importance of tx compliance, Risk factor reductions and Impressions

## 2022-03-01 ENCOUNTER — OFFICE VISIT (OUTPATIENT)
Dept: UROLOGY | Facility: HOSPITAL | Age: 72
End: 2022-03-01
Payer: MEDICARE

## 2022-03-01 VITALS
HEART RATE: 60 BPM | DIASTOLIC BLOOD PRESSURE: 72 MMHG | OXYGEN SATURATION: 98 % | WEIGHT: 184 LBS | SYSTOLIC BLOOD PRESSURE: 128 MMHG | HEIGHT: 67 IN | BODY MASS INDEX: 28.88 KG/M2

## 2022-03-01 DIAGNOSIS — G47.00 INSOMNIA, UNSPECIFIED TYPE: Primary | ICD-10-CM

## 2022-03-01 PROCEDURE — 99213 OFFICE O/P EST LOW 20 MIN: CPT | Performed by: NURSE PRACTITIONER

## 2022-03-01 RX ORDER — ZOLPIDEM TARTRATE 5 MG/1
5 TABLET ORAL
Qty: 5 TABLET | Refills: 0 | Status: SHIPPED | OUTPATIENT
Start: 2022-03-01

## 2022-03-28 ENCOUNTER — OFFICE VISIT (OUTPATIENT)
Dept: FAMILY MEDICINE CLINIC | Facility: CLINIC | Age: 72
End: 2022-03-28
Payer: MEDICARE

## 2022-03-28 VITALS
HEART RATE: 57 BPM | HEIGHT: 67 IN | SYSTOLIC BLOOD PRESSURE: 122 MMHG | BODY MASS INDEX: 28.94 KG/M2 | DIASTOLIC BLOOD PRESSURE: 78 MMHG | RESPIRATION RATE: 16 BRPM | WEIGHT: 184.4 LBS

## 2022-03-28 DIAGNOSIS — N39.8 VOIDING DYSFUNCTION: ICD-10-CM

## 2022-03-28 DIAGNOSIS — G47.00 INSOMNIA, UNSPECIFIED TYPE: Primary | ICD-10-CM

## 2022-03-28 PROCEDURE — 99213 OFFICE O/P EST LOW 20 MIN: CPT | Performed by: FAMILY MEDICINE

## 2022-03-28 RX ORDER — HYDROXYZINE HYDROCHLORIDE 25 MG/1
25 TABLET, FILM COATED ORAL
Qty: 15 TABLET | Refills: 0 | Status: SHIPPED | OUTPATIENT
Start: 2022-03-28 | End: 2022-07-08

## 2022-03-29 NOTE — PROGRESS NOTES
Assessment/Plan:    1  Insomnia, unspecified type  - discussed sleep hygiene, using sound machine and advised to avoid caffeine in late afternoon or evening hours, advised trial of hydroxyzine as needed, also discussed trazodone if his symptoms persist   - hydrOXYzine HCL (ATARAX) 25 mg tablet; Take 1 tablet (25 mg total) by mouth daily at bedtime as needed (for sleep)  Dispense: 15 tablet; Refill: 0    2  Voiding dysfunction/ nocturia  - following with Urology and is doing better on Flomax 0 4 mg at bedtime       Follow up as scheduled on 05/25/22 or sooner if needed  The treatment plan and possible side effects of new medications were reviewed with the patient today  The patient understands and agrees with the treatment plan  Subjective:   Chief Complaint   Patient presents with    Insomnia      Patient ID: Mayra Gamble is a 80 y o  male who presents today with c/o trouble sleeping for the past several months, he is able to fall asleep without difficulty, but wakes up within 2-3 hours and having trouble going back to sleep   His symptoms initially seem to be due to increased nocturia, but he was recently started on Flomax at bedtime by Urology with good results and is no longer having significant nocturia  Patient denies anxiety, excessive worry, depressed mood, snoring or witnessed episodes of apnea  He tried OTC sleep aids, including melatonin without significant improvement         The following portions of the patient's history were reviewed and updated as appropriate: allergies, current medications, past family history, past medical history, past social history, past surgical history and problem list     Past Medical History:   Diagnosis Date    Adenomatous colon polyp     Aortic regurgitation     Aortic stenosis     Diverticulosis     Hemorrhoids     Last Assessed: 11/10/17    Hyperlipidemia     Hypertension     Serrated adenoma of colon     Visual disturbances     Vitamin D deficiency Past Surgical History:   Procedure Laterality Date    COLONOSCOPY      COLONOSCOPY N/A 11/28/2017    Procedure: COLONOSCOPY with polypectomy, random bx, and hemorrhoid banding;  Surgeon: Juan Singer MD;  Location: [facility]; Service: General    COLONOSCOPY N/A 2/26/2018    Procedure: COLONOSCOPY;  Surgeon: Margarette Herzog MD;  [facility]: [facility]; Service: Gastroenterology    MOUTH SURGERY      [facility] COLONOSCOPY FLX DX W/[facility] WHEN PFRMD N/A 11/29/2016    Procedure: COLONOSCOPY POSSIBLE BANDING;  Surgeon: Juan Singer MD;  Location: [facility]; Service: General     Family History   Problem Relation Age of Onset    Asthma Mother     Heart failure Mother         Congestive    Depression Mother     Alcohol abuse Father     Stroke Father         CVA    Thyroid disease Sister     Heart attack Brother         Myocardial Infarction    Depression Brother     Stroke Family         Syndrome     Social History     Socioeconomic History    Marital status: /Civil Union     Spouse name: Not on file    Number of children: Not on file    Years of education: Not on file    Highest education level: Not on file   Occupational History    Not on file   Tobacco Use    Smoking status: Never Smoker    Smokeless tobacco: Never Used   Vaping Use    Vaping Use: Never used   Substance and Sexual Activity    Alcohol use: Yes     Comment: 2 beers daily   Per [employer]: minimal usage    Drug use: No    Sexual activity: Not on file   Other Topics Concern    Not on file   Social History Narrative    Caffeine use: 3 cups coffe per day    Has smoke detectors    Uses safety equipment: seatbelts     Social Determinants of Health     Financial Resource Strain: Not on file   Food Insecurity: Not on file   Transportation Needs: Not on file   Physical Activity: Not on file   Stress: Not on file   Social Connections: Not on file   Intimate Partner Violence: Not on file   Housing Stability: Not on file Current Outpatient Medications:     aspirin 81 MG tablet, Take 81 mg by mouth daily, Disp: , Rfl:     atorvastatin (LIPITOR) 40 mg tablet, Take 1 tablet (40 mg total) by mouth daily, Disp: 90 tablet, Rfl: 3    Cholecalciferol (VITAMIN D-400 PO), Take by mouth, Disp: , Rfl:     docusate sodium (COLACE) 50 mg capsule, Take by mouth daily, Disp: , Rfl:     ketoconazole (NIZORAL) 2 % cream, as needed , Disp: , Rfl:     losartan (COZAAR) 100 MG tablet, Take 1 tablet (100 mg total) by mouth daily, Disp: 90 tablet, Rfl: 3    Multiple Vitamins-Minerals (CENTRUM SILVER PO), Take by mouth daily, Disp: , Rfl:     Omega-3 Fatty Acids (OMEGA 3 PO), Take by mouth daily  , Disp: , Rfl:     tamsulosin (FLOMAX) 0 4 mg, Take 1 capsule (0 4 mg total) by mouth daily with dinner, Disp: 30 capsule, Rfl: 3    zolpidem (AMBIEN) 5 mg tablet, Take 1 tablet (5 mg total) by mouth daily at bedtime as needed for sleep, Disp: 5 tablet, Rfl: 0    hydrOXYzine HCL (ATARAX) 25 mg tablet, Take 1 tablet (25 mg total) by mouth daily at bedtime as needed (for sleep), Disp: 15 tablet, Rfl: 0    Review of Systems   Constitutional: Negative for appetite change, chills, fatigue, fever and unexpected weight change  Respiratory: Negative for cough, shortness of breath and wheezing  Cardiovascular: Negative for chest pain, palpitations and leg swelling  Gastrointestinal: Negative for abdominal pain, blood in stool, constipation, diarrhea, nausea and vomiting  Neurological: Negative for dizziness and headaches  Psychiatric/Behavioral: Positive for sleep disturbance  Negative for dysphoric mood  The patient is not nervous/anxious  Objective:    Vitals:    03/28/22 1517   BP: 122/78   Pulse: 57   Resp: 16   Weight: 83 6 kg (184 lb 6 4 oz)   Height: 5' 7" (1 702 m)        Physical Exam  Constitutional:       General: He is not in acute distress  Cardiovascular:      Rate and Rhythm: Normal rate and regular rhythm        Heart sounds: Normal heart sounds  Pulmonary:      Effort: Pulmonary effort is normal       Breath sounds: Normal breath sounds  Neurological:      Mental Status: He is alert and oriented to person, place, and time  Psychiatric:         Mood and Affect: Mood normal          Behavior: Behavior normal          Thought Content:  Thought content normal

## 2022-04-04 ENCOUNTER — TELEPHONE (OUTPATIENT)
Dept: FAMILY MEDICINE CLINIC | Facility: CLINIC | Age: 72
End: 2022-04-04

## 2022-04-04 NOTE — TELEPHONE ENCOUNTER
Pt called regarding his new medication he started after his last visit (hydroxyzine)  He said it is not working  It helped a little for the first 2 days only, and is not working at all now

## 2022-04-05 NOTE — TELEPHONE ENCOUNTER
Please call patient, I recommend Trazodone 50 mg, he should initially try 1/2 tablet daily before bedtime and increase to 1 tablet as needed   I am sending his script to [facility]

## 2022-04-25 DIAGNOSIS — R39.15 URGENCY OF URINATION: ICD-10-CM

## 2022-04-25 RX ORDER — TAMSULOSIN HYDROCHLORIDE 0.4 MG/1
CAPSULE ORAL
Qty: 30 CAPSULE | Refills: 3 | Status: SHIPPED | OUTPATIENT
Start: 2022-04-25

## 2022-04-27 DIAGNOSIS — G47.00 INSOMNIA, UNSPECIFIED TYPE: ICD-10-CM

## 2022-04-27 RX ORDER — TRAZODONE HYDROCHLORIDE 50 MG/1
TABLET ORAL
Qty: 30 TABLET | Refills: 1 | Status: SHIPPED | OUTPATIENT
Start: 2022-04-27 | End: 2022-06-25

## 2022-05-11 ENCOUNTER — APPOINTMENT (OUTPATIENT)
Dept: LAB | Facility: CLINIC | Age: 72
End: 2022-05-11
Payer: MEDICARE

## 2022-05-11 DIAGNOSIS — E78.5 HYPERLIPIDEMIA, UNSPECIFIED HYPERLIPIDEMIA TYPE: ICD-10-CM

## 2022-05-11 DIAGNOSIS — E03.8 SUBCLINICAL HYPOTHYROIDISM: ICD-10-CM

## 2022-05-11 DIAGNOSIS — I10 ESSENTIAL HYPERTENSION: ICD-10-CM

## 2022-05-11 LAB
ALBUMIN SERPL BCP-MCNC: 3.8 G/DL (ref 3.5–5)
ALP SERPL-CCNC: 95 U/L (ref 46–116)
ALT SERPL W P-5'-P-CCNC: 48 U/L (ref 12–78)
ANION GAP SERPL CALCULATED.3IONS-SCNC: 3 MMOL/L (ref 4–13)
AST SERPL W P-5'-P-CCNC: 25 U/L (ref 5–45)
BILIRUB SERPL-MCNC: 0.77 MG/DL (ref 0.2–1)
BUN SERPL-MCNC: 13 MG/DL (ref 5–25)
CALCIUM SERPL-MCNC: 9.4 MG/DL (ref 8.3–10.1)
CHLORIDE SERPL-SCNC: 106 MMOL/L (ref 100–108)
CHOLEST SERPL-MCNC: 189 MG/DL
CO2 SERPL-SCNC: 28 MMOL/L (ref 21–32)
CREAT SERPL-MCNC: 0.77 MG/DL (ref 0.6–1.3)
GFR SERPL CREATININE-BSD FRML MDRD: 91 ML/MIN/1.73SQ M
GLUCOSE P FAST SERPL-MCNC: 102 MG/DL (ref 65–99)
HDLC SERPL-MCNC: 43 MG/DL
LDLC SERPL CALC-MCNC: 126 MG/DL (ref 0–100)
POTASSIUM SERPL-SCNC: 4.5 MMOL/L (ref 3.5–5.3)
PROT SERPL-MCNC: 7.7 G/DL (ref 6.4–8.2)
SODIUM SERPL-SCNC: 137 MMOL/L (ref 136–145)
TRIGL SERPL-MCNC: 101 MG/DL
TSH SERPL DL<=0.05 MIU/L-ACNC: 3.91 UIU/ML (ref 0.45–4.5)

## 2022-05-11 PROCEDURE — 80053 COMPREHEN METABOLIC PANEL: CPT

## 2022-05-11 PROCEDURE — 84443 ASSAY THYROID STIM HORMONE: CPT

## 2022-05-11 PROCEDURE — 36415 COLL VENOUS BLD VENIPUNCTURE: CPT

## 2022-05-11 PROCEDURE — 80061 LIPID PANEL: CPT

## 2022-06-25 DIAGNOSIS — G47.00 INSOMNIA, UNSPECIFIED TYPE: ICD-10-CM

## 2022-06-25 RX ORDER — TRAZODONE HYDROCHLORIDE 50 MG/1
TABLET ORAL
Qty: 30 TABLET | Refills: 1 | Status: SHIPPED | OUTPATIENT
Start: 2022-06-25

## 2022-07-08 ENCOUNTER — OFFICE VISIT (OUTPATIENT)
Dept: FAMILY MEDICINE CLINIC | Facility: CLINIC | Age: 72
End: 2022-07-08
Payer: MEDICARE

## 2022-07-08 VITALS
SYSTOLIC BLOOD PRESSURE: 128 MMHG | RESPIRATION RATE: 16 BRPM | WEIGHT: 181 LBS | OXYGEN SATURATION: 98 % | HEIGHT: 69 IN | DIASTOLIC BLOOD PRESSURE: 74 MMHG | HEART RATE: 51 BPM | BODY MASS INDEX: 26.81 KG/M2

## 2022-07-08 DIAGNOSIS — R39.15 URINARY URGENCY: ICD-10-CM

## 2022-07-08 DIAGNOSIS — R73.01 ELEVATED FASTING GLUCOSE: ICD-10-CM

## 2022-07-08 DIAGNOSIS — Z00.00 MEDICARE ANNUAL WELLNESS VISIT, SUBSEQUENT: Primary | ICD-10-CM

## 2022-07-08 DIAGNOSIS — Z12.5 SCREENING FOR PROSTATE CANCER: ICD-10-CM

## 2022-07-08 DIAGNOSIS — G47.00 INSOMNIA, UNSPECIFIED TYPE: ICD-10-CM

## 2022-07-08 DIAGNOSIS — E78.5 HYPERLIPIDEMIA, UNSPECIFIED HYPERLIPIDEMIA TYPE: ICD-10-CM

## 2022-07-08 DIAGNOSIS — E03.8 SUBCLINICAL HYPOTHYROIDISM: ICD-10-CM

## 2022-07-08 DIAGNOSIS — I10 ESSENTIAL HYPERTENSION: ICD-10-CM

## 2022-07-08 PROCEDURE — G0439 PPPS, SUBSEQ VISIT: HCPCS | Performed by: FAMILY MEDICINE

## 2022-07-08 PROCEDURE — 99214 OFFICE O/P EST MOD 30 MIN: CPT | Performed by: FAMILY MEDICINE

## 2022-07-08 NOTE — PROGRESS NOTES
Assessment and Plan:     1  Medicare annual wellness visit, subsequent  - discussed Shingrix    2  Essential hypertension  - well controlled, continue losartan 100 mg daily, also on Flomax  - Comprehensive metabolic panel; Future  - CBC and differential; Future    3  Hyperlipidemia  - , stable, HDL 43, continue atorvastatin 40 mg daily, reviewed heart healthy diet and regular exercise, will recheck lipid panel and CMP prior to next visit  - Lipid Panel with Direct LDL reflex; Future  - Comprehensive metabolic panel; Future    4  Subclinical hypothyroidism  - TSH 3/91, within normal range  - will continue monitoring and recheck TFT's in 1 year    5  Elevated fasting glucose  - reviewed low sugar/ low carb diet  - Comprehensive metabolic panel; Future  - Hemoglobin A1C; Future    6  Insomnia, unspecified type  - doing better on trazodone 50 mg 1/2 tablet at bedtime    7  Urinary ugency   - following with Urology and doing better on Flomax    Follow up in 6 months or sooner as needed  The patient understands and agrees with the treatment plan  Depression Screening and Follow-up Plan: Patient was screened for depression during today's encounter  They screened negative with a PHQ-2 score of 0  Preventive health issues were discussed with patient, and age appropriate screening tests were ordered as noted in patient's After Visit Summary  Personalized health advice and appropriate referrals for health education or preventive services given if needed, as noted in patient's After Visit Summary  History of Present Illness:     Patient presents for a Medicare Wellness Visit and follow up visit for his chronic conditions  Patient offers no complaints at this time, he is following with Urology and was recently started on Flomax 0 4 mg at bedtime with significant improvement of urinary urgency and nocturia          Patient Care Team:  Bandar Gregory MD as PCP - General  MD Sahil Oswald MD Mateo Chandra MD as Endoscopist     Review of Systems:     Review of Systems   Constitutional: Negative for appetite change, chills, fatigue, fever and unexpected weight change  Respiratory: Negative for cough, shortness of breath and wheezing  Cardiovascular: Negative for chest pain, palpitations and leg swelling  Gastrointestinal: Negative for abdominal pain, blood in stool, constipation, diarrhea, nausea and vomiting  Genitourinary: Positive for urgency  Negative for dysuria, frequency and hematuria  Neurological: Negative for dizziness, syncope, weakness, numbness and headaches  Psychiatric/Behavioral: Negative for dysphoric mood and sleep disturbance  The patient is not nervous/anxious  Problem List:     Patient Active Problem List   Diagnosis    Serrated adenoma of colon    Aortic regurgitation    Pure hypercholesterolemia    Essential hypertension    Bicuspid aortic valve    H/O amaurosis fugax 2016      Past Medical and Surgical History:     Past Medical History:   Diagnosis Date    Adenomatous colon polyp     Aortic regurgitation     Aortic stenosis     Coronary artery disease Mytral valve, congenital    Diverticulosis     Heart murmur     Hemorrhoids     Last Assessed: 11/10/17    Hyperlipidemia     Hypertension     Serrated adenoma of colon     Visual disturbances     Visual impairment     Vitamin D deficiency      Past Surgical History:   Procedure Laterality Date    COLONOSCOPY      COLONOSCOPY N/A 11/28/2017    Procedure: COLONOSCOPY with polypectomy, random bx, and hemorrhoid banding;  Surgeon: Jayashree Soriano MD;  Location: [facility]; Service: General    COLONOSCOPY N/A 2/26/2018    Procedure: COLONOSCOPY;  Surgeon: Stephania Taylor MD;  Location: [facility];   Service: Gastroenterology    MOUTH SURGERY      [place] COLONOSCOPY FLX DX W/[facility] WHEN PFRMD N/A 11/29/2016    Procedure: COLONOSCOPY POSSIBLE BANDING;  Surgeon: Adilson Ruvalcaba MD;  Location: [facility]; Service: General      Family History:     Family History   Problem Relation Age of Onset    Asthma Mother     Heart failure Mother         Congestive    Depression Mother     COPD Mother     Alcohol abuse Father     Stroke Father         CVA    Thyroid disease Sister     Heart attack Brother         Myocardial Infarction    Depression Brother     Stroke Family         Syndrome      Social History:     Social History     Socioeconomic History    Marital status: /Civil Union     Spouse name: None    Number of children: None    Years of education: None    Highest education level: None   Occupational History    None   Tobacco Use    Smoking status: Never Smoker    Smokeless tobacco: Never Used   Vaping Use    Vaping Use: Never used   Substance and Sexual Activity    Alcohol use: Yes     Alcohol/week: 14 0 standard drinks     Types: 14 Cans of beer per week     Comment: 2 beers daily   Per [employer]: minimal usage    Drug use: No    Sexual activity: Yes     Partners: Female   Other Topics Concern    None   Social History Narrative    Caffeine use: 3 cups coffe per day    Has smoke detectors    Uses safety equipment: seatbelts     Social Determinants of Health     Financial Resource Strain: Not on file   Food Insecurity: Not on file   Transportation Needs: Not on file   Physical Activity: Not on file   Stress: Not on file   Social Connections: Not on file   Intimate Partner Violence: Not on file   Housing Stability: Not on file      Medications and Allergies:     Current Outpatient Medications   Medication Sig Dispense Refill    aspirin 81 MG tablet Take 81 mg by mouth daily      atorvastatin (LIPITOR) 40 mg tablet Take 1 tablet (40 mg total) by mouth daily 90 tablet 3    Cholecalciferol (VITAMIN D-400 PO) Take by mouth      docusate sodium (COLACE) 50 mg capsule Take by mouth daily      hydrOXYzine HCL (ATARAX) 25 mg tablet Take 1 tablet (25 mg total) by mouth daily at bedtime as needed (for sleep) 15 tablet 0    ketoconazole (NIZORAL) 2 % cream as needed       losartan (COZAAR) 100 MG tablet Take 1 tablet (100 mg total) by mouth daily 90 tablet 3    Multiple Vitamins-Minerals (CENTRUM SILVER PO) Take by mouth daily      Omega-3 Fatty Acids (OMEGA 3 PO) Take by mouth daily        tamsulosin (FLOMAX) 0 4 mg TAKE 1 CAPSULE BY MOUTH EVERY DAY WITH DINNER 30 capsule 3    traZODone (DESYREL) 50 mg tablet TAKE 1 TABLET BY MOUTH EVERYDAY AT BEDTIME 30 tablet 1    zolpidem (AMBIEN) 5 mg tablet Take 1 tablet (5 mg total) by mouth daily at bedtime as needed for sleep 5 tablet 0     No current facility-administered medications for this visit  Allergies   Allergen Reactions    Sulfa Antibiotics Rash     redness      Immunizations:     Immunization History   Administered Date(s) Administered    COVID-19 MODERNA VACC 0 5 ML IM 03/05/2021, 04/03/2021, 11/16/2021    Fluzone Split Quad 0 5 mL 11/19/2013    INFLUENZA 11/19/2013, 09/27/2021    Influenza Quadrivalent Preservative Free 3 years and older IM 09/11/2015    Influenza Quadrivalent, 6-35 Months IM 11/10/2014, 09/09/2016    Influenza Split High Dose Preservative Free IM 09/18/2017    Influenza, high dose seasonal 0 7 mL 09/17/2018, 09/27/2019, 08/25/2020, 09/27/2021    Influenza, seasonal, injectable 08/30/2011, 11/13/2012    Pneumococcal Conjugate 13-Valent 03/10/2017    Pneumococcal Polysaccharide PPV23 03/23/2018    Tdap 08/12/2008, 06/15/2021      Health Maintenance:         Topic Date Due    Colorectal Cancer Screening  08/31/2026    Hepatitis C Screening  Completed         Topic Date Due    COVID-19 Vaccine (4 - Booster for [place] series) 03/16/2022    Influenza Vaccine (1) 09/01/2022      Medicare Screening Tests and Risk Assessments:     Ronny Beasley is here for his Subsequent Wellness visit  Last Medicare Wellness visit information reviewed, patient interviewed, no change since last AWV  Health Risk Assessment:   Patient rates overall health as very good  Patient feels that their physical health rating is same  Patient is very satisfied with their life  Eyesight was rated as same  Hearing was rated as same  Patient feels that their emotional and mental health rating is much better  Patients states they are sometimes angry  Patient states they are sometimes unusually tired/fatigued  Pain experienced in the last 7 days has been none  Patient states that he has experienced no weight loss or gain in last 6 months  Depression Screening:   PHQ-2 Score: 0      Fall Risk Screening: In the past year, patient has experienced: no history of falling in past year      Home Safety:  Patient does not have trouble with stairs inside or outside of their home  Patient has working smoke alarms and has working carbon monoxide detector  Home safety hazards include: none  Nutrition:   Current diet is Regular and Limited junk food  Medications:   Patient is currently taking over-the-counter supplements  OTC medications include: see medication list  Patient is able to manage medications  Activities of Daily Living (ADLs)/Instrumental Activities of Daily Living (IADLs):   Walk and transfer into and out of bed and chair?: Yes  Dress and groom yourself?: Yes    Bathe or shower yourself?: Yes    Feed yourself?  Yes  Do your laundry/housekeeping?: Yes  Manage your money, pay your bills and track your expenses?: Yes  Make your own meals?: Yes    Do your own shopping?: Yes    Previous Hospitalizations:   Any hospitalizations or ED visits within the last 12 months?: No      Advance Care Planning:   Living will: Yes    Advanced directive: Yes      Cognitive Screening:   Provider or family/friend/caregiver concerned regarding cognition?: No    PREVENTIVE SCREENINGS      Cardiovascular Screening:    General: Screening Not Indicated and History Lipid Disorder      Diabetes Screening:     General: Screening Current      Colorectal Cancer Screening:     General: Screening Current      Prostate Cancer Screening:    General: Screening Current      Osteoporosis Screening:    General: Screening Not Indicated      Abdominal Aortic Aneurysm (AAA) Screening:    Risk factors include: age between 73-67 yo        General: Screening Current      Lung Cancer Screening:     General: Screening Not Indicated      Hepatitis C Screening:    General: Screening Current    Screening, Brief Intervention, and Referral to Treatment (SBIRT)    Screening  Typical number of drinks in a day: 2  Typical number of drinks in a week: 14  Interpretation: Low risk drinking behavior  Single Item Drug Screening:  How often have you used an illegal drug (including marijuana) or a prescription medication for non-medical reasons in the past year? never    Single Item Drug Screen Score: 0  Interpretation: Negative screen for possible drug use disorder       Physical Exam:     /74   Pulse (!) 51   Resp 16   Ht 5' 9" (1 753 m)   Wt 82 1 kg (181 lb)   SpO2 98%   BMI 26 73 kg/m²     Physical Exam  Constitutional:       General: He is not in acute distress  Cardiovascular:      Rate and Rhythm: Normal rate and regular rhythm  Heart sounds: Normal heart sounds  Pulmonary:      Effort: Pulmonary effort is normal       Breath sounds: Normal breath sounds  No wheezing, rhonchi or rales  Abdominal:      Palpations: Abdomen is soft  Tenderness: There is no abdominal tenderness  Musculoskeletal:      Right lower leg: No edema  Left lower leg: No edema  Neurological:      Mental Status: He is alert and oriented to person, place, and time  Psychiatric:         Mood and Affect: Mood normal          Behavior: Behavior normal          Thought Content:  Thought content normal         Lab Results   Component Value Date    CHOLESTEROL 189 05/11/2022    CHOLESTEROL 187 11/01/2021    CHOLESTEROL 204 (H) 05/03/2021     Lab Results Component Value Date    HDL 43 05/11/2022    HDL 44 11/01/2021    HDL 45 05/03/2021     Lab Results   Component Value Date    TRIG 101 05/11/2022    TRIG 91 11/01/2021    TRIG 101 05/03/2021     Lab Results   Component Value Date    LDLCALC 126 (H) 05/11/2022     Lab Results   Component Value Date     09/01/2015    SODIUM 137 05/11/2022    K 4 5 05/11/2022     05/11/2022    CO2 28 05/11/2022    ANIONGAP 9 09/01/2015    AGAP 3 (L) 05/11/2022    BUN 13 05/11/2022    CREATININE 0 77 05/11/2022    GLUC 106 01/29/2018    GLUF 102 (H) 05/11/2022    CALCIUM 9 4 05/11/2022    AST 25 05/11/2022    ALT 48 05/11/2022    ALKPHOS 95 05/11/2022    PROT 7 5 09/01/2015    TP 7 7 05/11/2022    BILITOT 0 57 09/01/2015    TBILI 0 77 05/11/2022    EGFR 91 05/11/2022     Lab Results   Component Value Date    PDP0UKANPLDG 3 910 05/11/2022     Lorena Lake MD     BMI Counseling: Body mass index is 26 73 kg/m²  The BMI is above normal  Nutrition recommendations include reducing portion sizes and consuming healthier snacks  Exercise recommendations include exercising 3-5 times per week

## 2022-07-08 NOTE — PATIENT INSTRUCTIONS

## 2022-08-24 DIAGNOSIS — R39.15 URGENCY OF URINATION: ICD-10-CM

## 2022-08-24 RX ORDER — TAMSULOSIN HYDROCHLORIDE 0.4 MG/1
CAPSULE ORAL
Qty: 30 CAPSULE | Refills: 3 | Status: SHIPPED | OUTPATIENT
Start: 2022-08-24

## 2022-11-04 ENCOUNTER — HOSPITAL ENCOUNTER (OUTPATIENT)
Dept: NON INVASIVE DIAGNOSTICS | Facility: CLINIC | Age: 72
Discharge: HOME | End: 2022-11-04

## 2022-11-04 VITALS
SYSTOLIC BLOOD PRESSURE: 128 MMHG | HEIGHT: 69 IN | HEART RATE: 50 BPM | DIASTOLIC BLOOD PRESSURE: 74 MMHG | WEIGHT: 181 LBS | BODY MASS INDEX: 26.81 KG/M2

## 2022-11-04 DIAGNOSIS — I35.1 AORTIC VALVE INSUFFICIENCY, ETIOLOGY OF CARDIAC VALVE DISEASE UNSPECIFIED: ICD-10-CM

## 2022-11-04 LAB
AORTIC ROOT: 3.7 CM
APICAL FOUR CHAMBER EJECTION FRACTION: 61 %
ASCENDING AORTA: 3.5 CM
AV REGURGITATION PRESSURE HALF TIME: 996 MS
E WAVE DECELERATION TIME: 185 MS
FRACTIONAL SHORTENING: 35 % (ref 28–44)
INTERVENTRICULAR SEPTUM IN DIASTOLE (PARASTERNAL SHORT AXIS VIEW): 1.1 CM
INTERVENTRICULAR SEPTUM: 1.1 CM (ref 0.6–1.1)
LAAS-AP2: 14.7 CM2
LAAS-AP4: 15.8 CM2
LEFT ATRIUM AREA SYSTOLE SINGLE PLANE A4C: 16.3 CM2
LEFT ATRIUM SIZE: 3.7 CM
LEFT INTERNAL DIMENSION IN SYSTOLE: 2.8 CM (ref 2.1–4)
LEFT VENTRICLE DIASTOLIC VOLUME (MOD BIPLANE): 122 ML
LEFT VENTRICLE SYSTOLIC VOLUME (MOD BIPLANE): 52 ML
LEFT VENTRICULAR INTERNAL DIMENSION IN DIASTOLE: 4.3 CM (ref 3.5–6)
LEFT VENTRICULAR POSTERIOR WALL IN END DIASTOLE: 1.1 CM
LEFT VENTRICULAR STROKE VOLUME: 54 ML
LV EF: 57 %
LVSV (TEICH): 54 ML
MV E'TISSUE VEL-SEP: 11 CM/S
MV PEAK A VEL: 0.54 M/S
MV PEAK E VEL: 57 CM/S
MV STENOSIS PRESSURE HALF TIME: 54 MS
MV VALVE AREA P 1/2 METHOD: 4.07 CM2
RIGHT ATRIUM AREA SYSTOLE A4C: 14.5 CM2
RIGHT VENTRICLE ID DIMENSION: 4.1 CM
SL CV AV DECELERATION TIME RETROGRADE: 3434 MS
SL CV AV PEAK GRADIENT RETROGRADE: 74 MMHG
SL CV LEFT ATRIUM LENGTH A2C: 4.5 CM
SL CV LV EF: 60
SL CV PED ECHO LEFT VENTRICLE DIASTOLIC VOLUME (MOD BIPLANE) 2D: 82 ML
SL CV PED ECHO LEFT VENTRICLE SYSTOLIC VOLUME (MOD BIPLANE) 2D: 29 ML

## 2022-11-07 ENCOUNTER — TELEPHONE (OUTPATIENT)
Dept: CARDIOLOGY CLINIC | Facility: CLINIC | Age: 72
End: 2022-11-07

## 2022-11-07 NOTE — TELEPHONE ENCOUNTER
----- Message from Eva Cerna, DO sent at 11/4/2022  5:06 PM EDT -----  Please let patient know echo looked fine  Unchanged from prior year       Called & left vmm with results

## 2022-12-07 ENCOUNTER — OFFICE VISIT (OUTPATIENT)
Dept: CARDIAC SURGERY | Facility: CLINIC | Age: 72
End: 2022-12-07

## 2022-12-07 VITALS
OXYGEN SATURATION: 98 % | SYSTOLIC BLOOD PRESSURE: 130 MMHG | HEIGHT: 69 IN | HEART RATE: 49 BPM | RESPIRATION RATE: 18 BRPM | DIASTOLIC BLOOD PRESSURE: 80 MMHG | TEMPERATURE: 96.4 F | BODY MASS INDEX: 28.02 KG/M2 | WEIGHT: 189.2 LBS

## 2022-12-07 DIAGNOSIS — I35.1 AORTIC VALVE INSUFFICIENCY, ETIOLOGY OF CARDIAC VALVE DISEASE UNSPECIFIED: Primary | ICD-10-CM

## 2022-12-07 DIAGNOSIS — I10 ESSENTIAL HYPERTENSION: ICD-10-CM

## 2022-12-07 DIAGNOSIS — Q23.1 BICUSPID AORTIC VALVE: ICD-10-CM

## 2022-12-07 DIAGNOSIS — Z86.69 H/O AMAUROSIS FUGAX: ICD-10-CM

## 2022-12-07 DIAGNOSIS — E78.00 PURE HYPERCHOLESTEROLEMIA: ICD-10-CM

## 2022-12-07 NOTE — PROGRESS NOTES
Cardiology Follow Up Visit    Shilo Tovar  1950  [id number]  [id number] [facility] [address] [address] [phone]    1  Aortic valve insufficiency, etiology of cardiac valve disease unspecified  POCT ECG    Echo complete w/ contrast if indicated      2  H/O amaurosis fugax 2016        3  Bicuspid aortic valve        4  Essential hypertension        5  Pure hypercholesterolemia              Discussion/Summary:       1  History of bicuspid aortic valve with 1 to 2+ aortic valve regurgitation, no interval change echocardiogram this year   No significant aortic valve stenosis     2  Essential hypertension, controlled     3  Mild ascending aortic root upper normal/mildly dilated, unchanged     4  Hyperlipidemia, on atorvastatin 40 mg daily, primary prevention, 's  5  Remote hx amaurosis fugax, 2016, carotid duplex no stenosis    Plan:  Patient 1 to 2+/ mild-to-moderate aortic valve regurgitation stable by echocardiogram     No evidence of significant aortic valve stenosis recent echocardiogram         Patient feels well  He is asymptomatic  Continue lifestyle modification with sensible/ Mediterranean type diet and daily activity and exercise  Med made no changes in his medical regimen  Stable cv status    Interval History:    26-year-old male follow-up bicuspid aortic valve with aortic valve regurgitation     No significant aortic valve stenosis     Essential hypertension which has been well controlled      Top normal diameter of m ascending aorta  Echocardiogram October 2021    Hyperlipidemia with LDL less than 100 on current dose of atorvastatin     Remote history of amaurosis fugax 2016, carotid duplex no stenosis  Feels well  No complaints      Patient Active Problem List   Diagnosis   • Serrated adenoma of colon   • Aortic regurgitation   • Pure hypercholesterolemia   • Essential hypertension   • Bicuspid aortic valve   • H/O amaurosis fugax 2016     Past Medical History:   Diagnosis Date   • Adenomatous colon polyp    • Aortic regurgitation    • Aortic stenosis    • Coronary artery disease Mytral valve, congenital   • Diverticulosis    • Heart murmur    • Hemorrhoids     Last Assessed: 11/10/17   • Hyperlipidemia    • Hypertension    • Serrated adenoma of colon    • Visual disturbances    • Visual impairment    • Vitamin D deficiency      Social History     Socioeconomic History   • Marital status: /Civil Union     Spouse name: Not on file   • Number of children: Not on file   • Years of education: Not on file   • Highest education level: Not on file   Occupational History   • Not on file   Tobacco Use   • Smoking status: Never   • Smokeless tobacco: Never   Vaping Use   • Vaping Use: Never used   Substance and Sexual Activity   • Alcohol use: Yes     Alcohol/week: 14 0 standard drinks     Types: 14 Cans of beer per week     Comment: 2 beers daily   Per [employer]: minimal usage   • Drug use: No   • Sexual activity: Yes     Partners: Female   Other Topics Concern   • Not on file   Social History Narrative    Caffeine use: 3 cups coffe per day    Has smoke detectors    Uses safety equipment: seatbelts     Social Determinants of Health     Financial Resource Strain: Not on file   Food Insecurity: Not on file   Transportation Needs: Not on file   Physical Activity: Not on file   Stress: Not on file   Social Connections: Not on file   Intimate Partner Violence: Not on file   Housing Stability: Not on file      Family History   Problem Relation Age of Onset   • Asthma Mother    • Heart failure Mother         Congestive   • Depression Mother    • COPD Mother    • Alcohol abuse Father    • Stroke Father         CVA   • Thyroid disease Sister    • Heart attack Brother         Myocardial Infarction   • Depression Brother    • Stroke Family         Syndrome     Past Surgical History: Procedure Laterality Date   • COLONOSCOPY     • COLONOSCOPY N/A 11/28/2017    Procedure: COLONOSCOPY with polypectomy, random bx, and hemorrhoid banding;  Surgeon: Chetan Grigsby MD;  Location: [facility]; Service: General   • COLONOSCOPY N/A 2/26/2018    Procedure: COLONOSCOPY;  Surgeon: Thomas Ortiz MD;  Location: [facility]; Service: Gastroenterology   • MOUTH SURGERY     • NE COLONOSCOPY FLX DX W/COLLJ SPEC WHEN PFRMD N/A 11/29/2016    Procedure: COLONOSCOPY POSSIBLE BANDING;  Surgeon: Chetan Grigsby MD;  Location: [facility];   Service: General       Current Outpatient Medications:   •  aspirin 81 MG tablet, Take 81 mg by mouth daily, Disp: , Rfl:   •  atorvastatin (LIPITOR) 40 mg tablet, Take 1 tablet (40 mg total) by mouth daily, Disp: 90 tablet, Rfl: 3  •  Cholecalciferol (VITAMIN D-400 PO), Take by mouth, Disp: , Rfl:   •  docusate sodium (COLACE) 50 mg capsule, Take by mouth daily, Disp: , Rfl:   •  ketoconazole (NIZORAL) 2 % cream, as needed , Disp: , Rfl:   •  losartan (COZAAR) 100 MG tablet, Take 1 tablet (100 mg total) by mouth daily, Disp: 90 tablet, Rfl: 3  •  Multiple Vitamins-Minerals (CENTRUM SILVER PO), Take by mouth daily, Disp: , Rfl:   •  Omega-3 Fatty Acids (OMEGA 3 PO), Take by mouth daily  , Disp: , Rfl:   •  tamsulosin (FLOMAX) 0 4 mg, TAKE 1 CAPSULE BY MOUTH EVERY DAY WITH DINNER, Disp: 30 capsule, Rfl: 3  •  traZODone (DESYREL) 50 mg tablet, TAKE 1 TABLET BY MOUTH EVERYDAY AT BEDTIME, Disp: 30 tablet, Rfl: 1  •  zolpidem (AMBIEN) 5 mg tablet, Take 1 tablet (5 mg total) by mouth daily at bedtime as needed for sleep, Disp: 5 tablet, Rfl: 0  Allergies   Allergen Reactions   • Sulfa Antibiotics Rash     redness         Social, Family, Medication history reviewed and updated as necessary      Labs:     Lab Results   Component Value Date     09/01/2015    K 4 5 05/11/2022     05/11/2022    CO2 28 05/11/2022    BUN 13 05/11/2022    CREATININE 0 77 05/11/2022    CREATININE 0 78 11/01/2021    GLUCOSE 94 09/01/2015    CALCIUM 9 4 05/11/2022       Lab Results   Component Value Date    WBC 5 59 05/03/2021    HGB 15 4 05/03/2021    HGB 15 7 09/10/2018    HCT 46 6 05/03/2021    HCT 46 7 09/10/2018     05/03/2021     09/10/2018       Lab Results   Component Value Date    CHOL 196 09/01/2015    CHOL 224 02/27/2015     Lab Results   Component Value Date    HDL 43 05/11/2022    HDL 44 11/01/2021     Lab Results   Component Value Date    LDLCALC 126 (H) 05/11/2022    LDLCALC 125 (H) 11/01/2021     No results found for: LDLDIRECT          Lab Results   Component Value Date    TRIG 101 05/11/2022    TRIG 91 11/01/2021       Lab Results   Component Value Date    ALT 48 05/11/2022    ALT 34 11/01/2021    AST 25 05/11/2022    AST 21 11/01/2021       No results found for: INR    No results found for: NTBNP    No results found for: HGBA1C        Imaging: Reviewed in epic        Review of Systems:  14 systems reviewed and negative with exception of the above        PHYSICAL EXAM:      Vitals:    12/07/22 0944   BP: (!) 174/82   Pulse: (!) 49   Resp: 18   Temp: (!) 96 4 °F (35 8 °C)   SpO2: 98%     Body mass index is 27 94 kg/m²  Weight (last 2 days)     Date/Time Weight    12/07/22 0944 85 8 (189 2)          130/80 bp recheck me  Gen: No acute distress  HEENT: anicteric, mucous membranes moist  Neck: supple, no jugular venous distention, or carotid bruit  Heart: regular, normal s1 and s2,2/6 zoë  Lungs :clear to auscultation bilaterally, no rales/rhonchi or wheeze  Abdomen: soft nontender, normoactive bowel sounds, no organomegaly  Ext: warm and perfused, normal femoral pulses, no edema, or clubbing  Skin: warm, no rashes  Neuro: AAO x 3, no focal findings  Psychiatric: normal affect  Musculoskeletal: no obvious joint deformities  This note was completed in part utilizing m-modal fluency direct voice recognition software     Grammatical errors, random word insertion, spelling mistakes, and incomplete sentences may be an occasional consequence of the system secondary to software limitations, ambient noise and hardware issues  At the time of dictation, efforts were made to edit, clarify and /or correct errors  Please read the chart carefully and recognize, using context, where substitutions have occurred  If you have any questions or concerns about the context, text or information contained within the body of this dictation, please contact myself, the provider, for further clarification

## 2022-12-07 NOTE — LETTER
[facility] [address]  [address] [address]  Phone#  [phone]  Fax#  [phone]

## 2022-12-21 ENCOUNTER — APPOINTMENT (OUTPATIENT)
Dept: LAB | Facility: CLINIC | Age: 72
End: 2022-12-21

## 2022-12-21 DIAGNOSIS — R73.01 ELEVATED FASTING GLUCOSE: ICD-10-CM

## 2022-12-21 DIAGNOSIS — I10 ESSENTIAL HYPERTENSION: ICD-10-CM

## 2022-12-21 DIAGNOSIS — E78.5 HYPERLIPIDEMIA, UNSPECIFIED HYPERLIPIDEMIA TYPE: ICD-10-CM

## 2022-12-21 DIAGNOSIS — Z12.5 SCREENING FOR PROSTATE CANCER: ICD-10-CM

## 2022-12-21 LAB
ALBUMIN SERPL BCP-MCNC: 3.8 G/DL (ref 3.5–5)
ALP SERPL-CCNC: 94 U/L (ref 46–116)
ALT SERPL W P-5'-P-CCNC: 39 U/L (ref 12–78)
ANION GAP SERPL CALCULATED.3IONS-SCNC: 4 MMOL/L (ref 4–13)
AST SERPL W P-5'-P-CCNC: 27 U/L (ref 5–45)
BASOPHILS # BLD AUTO: 0.04 THOUSANDS/ÂΜL (ref 0–0.1)
BASOPHILS NFR BLD AUTO: 1 % (ref 0–1)
BILIRUB SERPL-MCNC: 0.66 MG/DL (ref 0.2–1)
BUN SERPL-MCNC: 14 MG/DL (ref 5–25)
CALCIUM SERPL-MCNC: 9.7 MG/DL (ref 8.3–10.1)
CHLORIDE SERPL-SCNC: 107 MMOL/L (ref 96–108)
CHOLEST SERPL-MCNC: 186 MG/DL
CO2 SERPL-SCNC: 25 MMOL/L (ref 21–32)
CREAT SERPL-MCNC: 0.73 MG/DL (ref 0.6–1.3)
EOSINOPHIL # BLD AUTO: 0.13 THOUSAND/ÂΜL (ref 0–0.61)
EOSINOPHIL NFR BLD AUTO: 2 % (ref 0–6)
ERYTHROCYTE [DISTWIDTH] IN BLOOD BY AUTOMATED COUNT: 12.4 % (ref 11.6–15.1)
EST. AVERAGE GLUCOSE BLD GHB EST-MCNC: 114 MG/DL
GFR SERPL CREATININE-BSD FRML MDRD: 93 ML/MIN/1.73SQ M
GLUCOSE P FAST SERPL-MCNC: 102 MG/DL (ref 65–99)
HBA1C MFR BLD: 5.6 %
HCT VFR BLD AUTO: 48.2 % (ref 36.5–49.3)
HDLC SERPL-MCNC: 40 MG/DL
HGB BLD-MCNC: 16.2 G/DL (ref 12–17)
IMM GRANULOCYTES # BLD AUTO: 0.01 THOUSAND/UL (ref 0–0.2)
IMM GRANULOCYTES NFR BLD AUTO: 0 % (ref 0–2)
LDLC SERPL CALC-MCNC: 124 MG/DL (ref 0–100)
LYMPHOCYTES # BLD AUTO: 1.35 THOUSANDS/ÂΜL (ref 0.6–4.47)
LYMPHOCYTES NFR BLD AUTO: 21 % (ref 14–44)
MCH RBC QN AUTO: 31.5 PG (ref 26.8–34.3)
MCHC RBC AUTO-ENTMCNC: 33.6 G/DL (ref 31.4–37.4)
MCV RBC AUTO: 94 FL (ref 82–98)
MONOCYTES # BLD AUTO: 0.7 THOUSAND/ÂΜL (ref 0.17–1.22)
MONOCYTES NFR BLD AUTO: 11 % (ref 4–12)
NEUTROPHILS # BLD AUTO: 4.22 THOUSANDS/ÂΜL (ref 1.85–7.62)
NEUTS SEG NFR BLD AUTO: 65 % (ref 43–75)
NRBC BLD AUTO-RTO: 0 /100 WBCS
PLATELET # BLD AUTO: 208 THOUSANDS/UL (ref 149–390)
PMV BLD AUTO: 11.2 FL (ref 8.9–12.7)
POTASSIUM SERPL-SCNC: 4.6 MMOL/L (ref 3.5–5.3)
PROT SERPL-MCNC: 7.7 G/DL (ref 6.4–8.4)
PSA SERPL-MCNC: 3 NG/ML (ref 0–4)
RBC # BLD AUTO: 5.14 MILLION/UL (ref 3.88–5.62)
SODIUM SERPL-SCNC: 136 MMOL/L (ref 135–147)
TRIGL SERPL-MCNC: 112 MG/DL
WBC # BLD AUTO: 6.45 THOUSAND/UL (ref 4.31–10.16)

## 2023-01-09 ENCOUNTER — OFFICE VISIT (OUTPATIENT)
Dept: FAMILY MEDICINE CLINIC | Facility: CLINIC | Age: 73
End: 2023-01-09

## 2023-01-09 VITALS
DIASTOLIC BLOOD PRESSURE: 80 MMHG | RESPIRATION RATE: 16 BRPM | SYSTOLIC BLOOD PRESSURE: 122 MMHG | HEART RATE: 56 BPM | HEIGHT: 69 IN | WEIGHT: 182.6 LBS | BODY MASS INDEX: 27.05 KG/M2

## 2023-01-09 DIAGNOSIS — R39.15 URINARY URGENCY: ICD-10-CM

## 2023-01-09 DIAGNOSIS — R73.01 ELEVATED FASTING GLUCOSE: ICD-10-CM

## 2023-01-09 DIAGNOSIS — E78.5 HYPERLIPIDEMIA, UNSPECIFIED HYPERLIPIDEMIA TYPE: ICD-10-CM

## 2023-01-09 DIAGNOSIS — I35.1 AORTIC VALVE INSUFFICIENCY, ETIOLOGY OF CARDIAC VALVE DISEASE UNSPECIFIED: ICD-10-CM

## 2023-01-09 DIAGNOSIS — I10 ESSENTIAL HYPERTENSION: Primary | ICD-10-CM

## 2023-01-09 DIAGNOSIS — E03.8 SUBCLINICAL HYPOTHYROIDISM: ICD-10-CM

## 2023-01-09 NOTE — PROGRESS NOTES
Assessment/Plan:     Diagnoses and all orders for this visit:    Essential hypertension  -     well controlled, continue losartan 100 mg daily  -     Comprehensive metabolic panel; Future  -     TSH, 3rd generation with Free T4 reflex; Future  -     UA (URINE) with reflex to Scope; Future    Hyperlipidemia  -     , continue atorvastatin 40 mg daily, diet/ exercise, will recheck lipids prior to next visit  -     Lipid Panel with Direct LDL reflex; Future  -     Comprehensive metabolic panel; Future    Elevated fasting glucose  -    A1c 5 6, reviewed low sugar/ low carb diet    Hypothyroidism, subclinical  -  recent TSH 3 91, will recheck TFT's prior to next visit  -     TSH, 3rd generation with Free T4 reflex; Future    Urinary urgency  -   patient is doing well off Flomax, following with Urology    Aortic valve insufficiency    -   following with Cardiology     Return in 6 months or sooner as needed  The patient understands and agrees with the treatment plan  Subjective:   Chief Complaint   Patient presents with   • Hypertension   • Hyperlipidemia   • Hyperglycemia      Patient ID: Juan Landers is a 71 y o  male who presents today for follow up visit for hypertension, hyperlipidemia and review his lab results  Patient states he is feeling well and offers no complaints at this time  He stopped taking Flomax about 2 months ago and is doing well  He also stopped trazodone and taking OTC sleep aids as needed with good benefit         The following portions of the patient's history were reviewed and updated as appropriate: allergies, current medications, past family history, past medical history, past social history, past surgical history and problem list     Past Medical History:   Diagnosis Date   • Adenomatous colon polyp    • Aortic regurgitation    • Aortic stenosis    • Coronary artery disease Mytral valve, congenital   • Diverticulosis    • Heart murmur    • Hemorrhoids     Last Assessed: 11/10/17   • Hyperlipidemia    • Hypertension    • Serrated adenoma of colon    • Visual disturbances    • Visual impairment    • Vitamin D deficiency      Past Surgical History:   Procedure Laterality Date   • COLONOSCOPY     • COLONOSCOPY N/A 11/28/2017    Procedure: COLONOSCOPY with polypectomy, random bx, and hemorrhoid banding;  Surgeon: Emory Apple MD;  Location: [facility]; Service: General   • COLONOSCOPY N/A 2/26/2018    Procedure: COLONOSCOPY;  Surgeon: Nita Garcia MD;  Location: [facility]; Service: Gastroenterology   • MOUTH SURGERY     • [place] COLONOSCOPY FLX DX W/COLLJ SPEC WHEN PFRMD N/A 11/29/2016    Procedure: COLONOSCOPY POSSIBLE BANDING;  Surgeon: Emory Apple MD;  Location: [facility];   Service: General     Family History   Problem Relation Age of Onset   • Asthma Mother    • Heart failure Mother         Congestive   • Depression Mother    • COPD Mother    • Alcohol abuse Father    • Stroke Father         CVA   • Thyroid disease Sister    • Heart attack Brother         Myocardial Infarction   • Depression Brother    • Stroke Family         Syndrome     Social History     Socioeconomic History   • Marital status: /Civil Union     Spouse name: Not on file   • Number of children: Not on file   • Years of education: Not on file   • Highest education level: Not on file   Occupational History   • Not on file   Tobacco Use   • Smoking status: Never   • Smokeless tobacco: Never   Vaping Use   • Vaping Use: Never used   Substance and Sexual Activity   • Alcohol use: Not Currently   • Drug use: No   • Sexual activity: Yes     Partners: Female   Other Topics Concern   • Not on file   Social History Narrative    Caffeine use: 3 cups coffe per day    Has smoke detectors    Uses safety equipment: seatbelts     Social Determinants of Health     Financial Resource Strain: Not on file   Food Insecurity: Not on file   Transportation Needs: Not on file   Physical Activity: Not on file   Stress: Not on file Social Connections: Not on file   Intimate Partner Violence: Not on file   Housing Stability: Not on file       Current Outpatient Medications:   •  aspirin 81 MG tablet, Take 81 mg by mouth daily, Disp: , Rfl:   •  atorvastatin (LIPITOR) 40 mg tablet, Take 1 tablet (40 mg total) by mouth daily, Disp: 90 tablet, Rfl: 3  •  Cholecalciferol (VITAMIN D-400 PO), Take by mouth, Disp: , Rfl:   •  docusate sodium (COLACE) 50 mg capsule, Take by mouth daily, Disp: , Rfl:   •  ketoconazole (NIZORAL) 2 % cream, as needed , Disp: , Rfl:   •  losartan (COZAAR) 100 MG tablet, Take 1 tablet (100 mg total) by mouth daily, Disp: 90 tablet, Rfl: 3  •  Multiple Vitamins-Minerals (CENTRUM SILVER PO), Take by mouth daily, Disp: , Rfl:   •  Omega-3 Fatty Acids (OMEGA 3 PO), Take by mouth daily  , Disp: , Rfl:     Review of Systems   Constitutional: Negative for appetite change, chills, fatigue, fever and unexpected weight change  Respiratory: Negative for cough, shortness of breath and wheezing  Cardiovascular: Negative for chest pain, palpitations and leg swelling  Gastrointestinal: Negative for abdominal pain, blood in stool, constipation, diarrhea, nausea and vomiting  Genitourinary: Negative for dysuria, frequency and hematuria  Neurological: Negative for dizziness, syncope, weakness, numbness and headaches  Psychiatric/Behavioral: Negative for dysphoric mood and sleep disturbance  The patient is not nervous/anxious  Objective:    Vitals:    01/09/23 1356   BP: 122/80   Pulse: 56   Resp: 16   Weight: 82 8 kg (182 lb 9 6 oz)   Height: 5' 9" (1 753 m)     Wt Readings from Last 3 Encounters:   01/09/23 82 8 kg (182 lb 9 6 oz)   12/07/22 85 8 kg (189 lb 3 2 oz)   11/04/22 82 1 kg (181 lb)     BP Readings from Last 3 Encounters:   01/09/23 122/80   12/07/22 130/80   11/04/22 128/74        Physical Exam  Constitutional:       General: He is not in acute distress  Appearance: He is well-developed     Neck: Thyroid: No thyromegaly  Cardiovascular:      Rate and Rhythm: Normal rate and regular rhythm  Heart sounds: Normal heart sounds  Pulmonary:      Effort: Pulmonary effort is normal  No respiratory distress  Breath sounds: Normal breath sounds  Abdominal:      General: There is no distension  Palpations: Abdomen is soft  There is no mass  Tenderness: There is no abdominal tenderness  Musculoskeletal:      Cervical back: Neck supple  Right lower leg: No edema  Left lower leg: No edema  Lymphadenopathy:      Cervical: No cervical adenopathy  Neurological:      Mental Status: He is alert and oriented to person, place, and time  Psychiatric:         Mood and Affect: Mood normal          Behavior: Behavior normal          Thought Content:  Thought content normal         Lab Results   Component Value Date    CHOLESTEROL 186 12/21/2022    CHOLESTEROL 189 05/11/2022    CHOLESTEROL 187 11/01/2021     Lab Results   Component Value Date    HDL 40 12/21/2022    HDL 43 05/11/2022    HDL 44 11/01/2021     Lab Results   Component Value Date    TRIG 112 12/21/2022    TRIG 101 05/11/2022    TRIG 91 11/01/2021     Lab Results   Component Value Date    LDLCALC 124 (H) 12/21/2022     Lab Results   Component Value Date     09/01/2015    SODIUM 136 12/21/2022    K 4 6 12/21/2022     12/21/2022    CO2 25 12/21/2022    ANIONGAP 9 09/01/2015    AGAP 4 12/21/2022    BUN 14 12/21/2022    CREATININE 0 73 12/21/2022    GLUC 106 01/29/2018    GLUF 102 (H) 12/21/2022    CALCIUM 9 7 12/21/2022    AST 27 12/21/2022    ALT 39 12/21/2022    ALKPHOS 94 12/21/2022    PROT 7 5 09/01/2015    TP 7 7 12/21/2022    BILITOT 0 57 09/01/2015    TBILI 0 66 12/21/2022    EGFR 93 12/21/2022     Lab Results   Component Value Date    HGBA1C 5 6 12/21/2022     Lab Results   Component Value Date    BTY1WCWIUHTT 3 910 05/11/2022     Lab Results   Component Value Date    PSA 3 0 12/21/2022    PSA 2 4 11/01/2021 PSA 2 3 11/09/2020     Lab Results   Component Value Date    WBC 6 45 12/21/2022    HGB 16 2 12/21/2022    HCT 48 2 12/21/2022    MCV 94 12/21/2022     12/21/2022

## 2023-01-20 DIAGNOSIS — E78.5 HYPERLIPIDEMIA, UNSPECIFIED HYPERLIPIDEMIA TYPE: ICD-10-CM

## 2023-01-20 DIAGNOSIS — I10 ESSENTIAL HYPERTENSION: ICD-10-CM

## 2023-01-20 RX ORDER — LOSARTAN POTASSIUM 100 MG/1
TABLET ORAL
Qty: 90 TABLET | Refills: 3 | Status: SHIPPED | OUTPATIENT
Start: 2023-01-20

## 2023-01-20 RX ORDER — ATORVASTATIN CALCIUM 40 MG/1
TABLET, FILM COATED ORAL
Qty: 90 TABLET | Refills: 3 | Status: SHIPPED | OUTPATIENT
Start: 2023-01-20

## 2023-05-03 NOTE — PROGRESS NOTES
5/4/2023    Sunday Silva  1950  [id number]      Assessment  -BPH with lower urinary tract symptoms  -Prostate cancer screening     Discussion/Plan  Smith De La Paz is a 71 y o  male being managed by our office    1  BPH with lower urinary tract symptoms-we discussed his urinary symptoms as noted below  Unfortunately, he was unable to tolerate possible side effect from tamsulosin  Reviewed dietary recommendations and encourage patient to limit bladder irritants and increase water intake  However, he is interested in trialing an anticholinergic  Prescription for oxybutynin 10 mg daily was sent to his pharmacy  Reviewed mechanism of action, potential side effects, and administration instructions  2  Prostate cancer screening- we discussed the results of his last PSA from 12/21/22 which was 3 0, previously 2 4 (11/2021)  No abnormal findings noted on RUSS today  Follow-up in 3 months for reevaluation and PVR assessment  He was advised to call sooner with any questions or issues       -All questions answered, patient agrees with plan      History of Present Illness  71 y o  male with a history of BPH and prostate cancer screening  presents today for follow up  Patient last seen in the office in March 2022  He had previously been on tamsulosin 0 4 mg daily, but discontinued medication due to intolerable dry mouth  Patient states side effect occurred 8 months after starting medication  He continues to report symptoms of increased urinary urgency, frequency and nocturia x3  Patient denies any episodes of gross hematuria or dysuria  Last PSA from 12/21/22 was 3 0  He denies any strong family history of prostate malignancy  Review of Systems  Review of Systems   Constitutional: Negative  HENT: Negative  Respiratory: Negative  Cardiovascular: Negative  Gastrointestinal: Negative  Genitourinary: Positive for frequency and urgency   Negative for decreased urine volume, difficulty urinating, dysuria, flank pain and hematuria  Musculoskeletal: Negative  Skin: Negative  Neurological: Negative  Psychiatric/Behavioral: Negative  Past Medical History  Past Medical History:   Diagnosis Date    Adenomatous colon polyp     Aortic regurgitation     Aortic stenosis     Coronary artery disease Mytral valve, congenital    Diverticulosis     Heart murmur     Hemorrhoids     Last Assessed: 11/10/17    Hyperlipidemia     Hypertension     Serrated adenoma of colon     Visual disturbances     Visual impairment     Vitamin D deficiency        Past Social History  Past Surgical History:   Procedure Laterality Date    COLONOSCOPY      COLONOSCOPY N/A 11/28/2017    Procedure: COLONOSCOPY with polypectomy, random bx, and hemorrhoid banding;  Surgeon: Robbie Sanon MD;  Location: [facility]; Service: General    COLONOSCOPY N/A 2/26/2018    Procedure: COLONOSCOPY;  Surgeon: aKden Clemons MD;  [facility]; Service: Gastroenterology    MOUTH SURGERY      ND COLONOSCOPY FLX DX W/COLLJ [address] WHEN PFRMD N/A 11/29/2016    Procedure: COLONOSCOPY POSSIBLE BANDING;  Surgeon: Robbie Sanon MD;  Location: [facility];   Service: General       Past Family History  Family History   Problem Relation Age of Onset    Asthma Mother     Heart failure Mother         Congestive    Depression Mother     COPD Mother     Alcohol abuse Father     Stroke Father         CVA    Thyroid disease Sister     Heart attack Brother         Myocardial Infarction    Depression Brother     Stroke Family         Syndrome       Past Social history  Social History     Socioeconomic History    Marital status: /Civil Union     Spouse name: Not on file    Number of children: Not on file    Years of education: Not on file    Highest education level: Not on file   Occupational History    Not on file   Tobacco Use    Smoking status: Never    Smokeless tobacco: Never   Vaping Use    Vaping Use: Never used   Substance and Sexual Activity    Alcohol use: Not Currently    Drug use: No    Sexual activity: Yes     Partners: Female   Other Topics Concern    Not on file   Social History Narrative    Caffeine use: 3 cups coffe per day    Has smoke detectors    Uses safety equipment: seatbelts     Social Determinants of Health     Financial Resource Strain: Not on file   Food Insecurity: Not on file   Transportation Needs: Not on file   Physical Activity: Not on file   Stress: Not on file   Social Connections: Not on file   Intimate Partner Violence: Not on file   Housing Stability: Not on file       Current Medications  Current Outpatient Medications   Medication Sig Dispense Refill    aspirin 81 MG tablet Take 81 mg by mouth daily      atorvastatin (LIPITOR) 40 mg tablet TAKE 1 TABLET BY MOUTH EVERY DAY 90 tablet 3    Cholecalciferol (VITAMIN D-400 PO) Take by mouth      docusate sodium (COLACE) 50 mg capsule Take by mouth daily      ketoconazole (NIZORAL) 2 % cream as needed       losartan (COZAAR) 100 MG tablet TAKE 1 TABLET BY MOUTH EVERY DAY 90 tablet 3    Multiple Vitamins-Minerals (CENTRUM SILVER PO) Take by mouth daily      Omega-3 Fatty Acids (OMEGA 3 PO) Take by mouth daily         No current facility-administered medications for this visit  Allergies  Allergies   Allergen Reactions    Sulfa Antibiotics Rash     redness       Past Medical History, Social History, Family History, medications and allergies were reviewed  Vitals  There were no vitals filed for this visit  Physical Exam  Physical Exam  Constitutional:       Appearance: Normal appearance  He is well-developed  HENT:      Head: Normocephalic  Eyes:      Pupils: Pupils are equal, round, and reactive to light  Pulmonary:      Effort: Pulmonary effort is normal    Abdominal:      Palpations: Abdomen is soft     Genitourinary:     Prostate: Normal       Rectum: Normal       Comments: Prostate 50 g, smooth, nontender, no nodules  Musculoskeletal:         General: Normal range of motion  Cervical back: Normal range of motion  Skin:     General: Skin is warm and dry  Neurological:      General: No focal deficit present  Mental Status: He is alert and oriented to person, place, and time  Psychiatric:         Mood and Affect: Mood normal          Behavior: Behavior normal          Thought Content: Thought content normal          Judgment: Judgment normal          Results    I have personally reviewed all pertinent lab results and reviewed with patient  Lab Results   Component Value Date    PSA 3 0 12/21/2022    PSA 2 4 11/01/2021    PSA 2 3 11/09/2020     Lab Results   Component Value Date    GLUCOSE 94 09/01/2015    CALCIUM 9 7 12/21/2022     09/01/2015    K 4 6 12/21/2022    CO2 25 12/21/2022     12/21/2022    BUN 14 12/21/2022    CREATININE 0 73 12/21/2022     Lab Results   Component Value Date    WBC 6 45 12/21/2022    HGB 16 2 12/21/2022    HCT 48 2 12/21/2022    MCV 94 12/21/2022     12/21/2022     No results found for this or any previous visit (from the past 1 hour(s))

## 2023-05-04 ENCOUNTER — OFFICE VISIT (OUTPATIENT)
Dept: UROLOGY | Facility: HOSPITAL | Age: 73
End: 2023-05-04

## 2023-05-04 VITALS
HEART RATE: 66 BPM | WEIGHT: 183 LBS | BODY MASS INDEX: 27.02 KG/M2 | SYSTOLIC BLOOD PRESSURE: 142 MMHG | OXYGEN SATURATION: 97 % | DIASTOLIC BLOOD PRESSURE: 92 MMHG

## 2023-05-04 DIAGNOSIS — N40.1 BENIGN PROSTATIC HYPERPLASIA WITH LOWER URINARY TRACT SYMPTOMS, SYMPTOM DETAILS UNSPECIFIED: Primary | ICD-10-CM

## 2023-05-04 DIAGNOSIS — N32.81 OAB (OVERACTIVE BLADDER): ICD-10-CM

## 2023-05-04 RX ORDER — OXYBUTYNIN CHLORIDE 10 MG/1
10 TABLET, EXTENDED RELEASE ORAL
Qty: 30 TABLET | Refills: 3 | Status: SHIPPED | OUTPATIENT
Start: 2023-05-04 | End: 2023-06-03

## 2023-05-04 NOTE — PATIENT INSTRUCTIONS
BLADDER HEALTH    WHAT IS CONSIDERED NORMAL? The average bladder can hold about 2 cups of urine before it needs to be emptied  The normal range of voiding urine is 6 to 8 times during a 24 hour period  As we get older, our bladder capacity can get smaller and we may need to pass urine more frequently but usually not more than every 2 hours  Urine should flow easily without discomfort in a good, steady stream until the bladder is empty  No pushing or straining is necessary to empty the bladder  An urge is a signal that you feel as the bladder stretches to fill with urine  Urges can be felt even if the bladder is not full  Urges are not commands to go to the toilet, merely a signal and can be controlled  WHAT ARE GOOD BLADDER HABITS? Take your time when emptying your bladder  Dont strain or push to empty your bladder  Make sure you empty your bladder completely each time you pass urine  Do not rush the process  Consistently ignoring the urge to go (waiting more than 4 hours between toileting) or urinating too infrequently may be convenient but not healthy for your bladder  Avoid going to the toilet just in case or more often than every 2 hours  It is usually not necessary to go when you feel the first urge  Try to go only when your bladder is full  Urgency and frequency of urination can be improved by retraining the bladder and spacing your fluid intake throughout the day  Practice good toilet habits  Dont let your bladder control your life  TIPS TO MAINTAIN GOOD BLADDER HABITS  Maintain a good fluid intake  Depending on your body size and environment, drink 6 -8 cups (8 oz each) of fluid per day unless otherwise advised by your doctor  Not enough fluid creates a foul odor and dark color of the urine  Limit the amount of caffeine (coffee, cola, chocolate or tea) and citrus foods that you consume as these foods can be associated with increased sensation of urinary urgency and frequency  "    Limit the amount of alcohol you drink  Alcohol increases urine production and makes it difficult for the brain to coordinate bladder control  Avoid constipation by maintaining a balanced diet of dietary fiber  Cigarette smoking is also irritating to the bladder surface and is associated with bladder cancer  In addition, the coughing associated with smoking may lead to increased incontinent episodes because of the increased pressure  HOW DIET CAN AFFECT YOUR BLADDER  Although there is no particular \"diet\" that can cure bladder control, there are certain dietary suggestions you can use to help control the problem  There are 2 points to consider when evaluating how your habits and diet may affect your bladder:    Foods and fluids can irritate the bladder  Some foods and beverages are thought to contribute to bladder leakage and irritability  However their effect on the bladder is not completely understood and you may want to see if eliminating one or all of these items improves your bladder control  If you are unable to give them up completely, it is recommended that you use the following items in moderation:  Acidic beverages and foods (orange juice, grapefruit juice, lemonade etc)  Alcoholic beverages  Vinegar  Coffee (regular and decaf)  Tea (regular and decaf)  Caffeinated beverages  Carbonated beverages          Drinking enough and the right kinds of fluids  Many people with bladder control issues decrease their intake of liquids in hope that they will need to urinate less frequently or have less urinary leakage  You should not restrict fluids to control your bladder  While a decrease in liquid intake does result in a decrease in the volume of urine, the smaller amount of urine may be more highly concentrated  Highly concentrated, dark yellow urine is irritating to the bladder surface and may actually cause you to go to the bathroom more frequently        It also encourages the growth " of bacteria, which may lead to infections resulting in incontinence  Substitutions for Bladder Irritants: water is always the best beverage choice  Grape and apple juice are thirst quenchers are good selections and are not as irritating to the bladder    Low acid fruits:  Pears, apricots, papaya, watermelon  For coffee drinkers: KAVA®, Postum®, Isael®, Kaffree Kenisha®  For tea drinkers:  non-citrus or herbal and sun brewed tea

## 2023-08-07 ENCOUNTER — APPOINTMENT (OUTPATIENT)
Dept: LAB | Facility: CLINIC | Age: 73
End: 2023-08-07
Payer: MEDICARE

## 2023-08-07 DIAGNOSIS — E78.5 HYPERLIPIDEMIA, UNSPECIFIED HYPERLIPIDEMIA TYPE: ICD-10-CM

## 2023-08-07 DIAGNOSIS — I10 ESSENTIAL HYPERTENSION: ICD-10-CM

## 2023-08-07 DIAGNOSIS — E03.8 SUBCLINICAL HYPOTHYROIDISM: ICD-10-CM

## 2023-08-07 LAB
ALBUMIN SERPL BCP-MCNC: 4 G/DL (ref 3.5–5)
ALP SERPL-CCNC: 98 U/L (ref 46–116)
ALT SERPL W P-5'-P-CCNC: 35 U/L (ref 12–78)
ANION GAP SERPL CALCULATED.3IONS-SCNC: 3 MMOL/L
AST SERPL W P-5'-P-CCNC: 20 U/L (ref 5–45)
BILIRUB SERPL-MCNC: 0.78 MG/DL (ref 0.2–1)
BILIRUB UR QL STRIP: NEGATIVE
BUN SERPL-MCNC: 11 MG/DL (ref 5–25)
CALCIUM SERPL-MCNC: 9.8 MG/DL (ref 8.3–10.1)
CHLORIDE SERPL-SCNC: 107 MMOL/L (ref 96–108)
CHOLEST SERPL-MCNC: 189 MG/DL
CLARITY UR: CLEAR
CO2 SERPL-SCNC: 27 MMOL/L (ref 21–32)
COLOR UR: COLORLESS
CREAT SERPL-MCNC: 0.84 MG/DL (ref 0.6–1.3)
GFR SERPL CREATININE-BSD FRML MDRD: 88 ML/MIN/1.73SQ M
GLUCOSE P FAST SERPL-MCNC: 107 MG/DL (ref 65–99)
GLUCOSE UR STRIP-MCNC: NEGATIVE MG/DL
HDLC SERPL-MCNC: 43 MG/DL
HGB UR QL STRIP.AUTO: NEGATIVE
KETONES UR STRIP-MCNC: NEGATIVE MG/DL
LDLC SERPL CALC-MCNC: 126 MG/DL (ref 0–100)
LEUKOCYTE ESTERASE UR QL STRIP: NEGATIVE
NITRITE UR QL STRIP: NEGATIVE
PH UR STRIP.AUTO: 6.5 [PH]
POTASSIUM SERPL-SCNC: 4.7 MMOL/L (ref 3.5–5.3)
PROT SERPL-MCNC: 7.9 G/DL (ref 6.4–8.4)
PROT UR STRIP-MCNC: NEGATIVE MG/DL
SODIUM SERPL-SCNC: 137 MMOL/L (ref 135–147)
SP GR UR STRIP.AUTO: 1.01 (ref 1–1.03)
TRIGL SERPL-MCNC: 98 MG/DL
UROBILINOGEN UR STRIP-ACNC: <2 MG/DL

## 2023-08-07 PROCEDURE — 80053 COMPREHEN METABOLIC PANEL: CPT

## 2023-08-07 PROCEDURE — 36415 COLL VENOUS BLD VENIPUNCTURE: CPT

## 2023-08-07 PROCEDURE — 81003 URINALYSIS AUTO W/O SCOPE: CPT

## 2023-08-07 PROCEDURE — 80061 LIPID PANEL: CPT

## 2023-08-07 PROCEDURE — 84443 ASSAY THYROID STIM HORMONE: CPT

## 2023-08-08 ENCOUNTER — RA CDI HCC (OUTPATIENT)
Dept: OTHER | Facility: HOSPITAL | Age: 73
End: 2023-08-08

## 2023-08-08 LAB — TSH SERPL DL<=0.05 MIU/L-ACNC: 3.11 UIU/ML (ref 0.45–4.5)

## 2023-08-08 NOTE — PROGRESS NOTES
[facility] coding opportunities       Chart reviewed, no opportunity found: CHART REVIEWED, NO OPPORTUNITY FOUND        Patients Insurance     Medicare Insurance: Medicare

## 2023-08-14 ENCOUNTER — OFFICE VISIT (OUTPATIENT)
Dept: FAMILY MEDICINE CLINIC | Facility: CLINIC | Age: 73
End: 2023-08-14
Payer: MEDICARE

## 2023-08-14 VITALS
DIASTOLIC BLOOD PRESSURE: 82 MMHG | HEART RATE: 96 BPM | WEIGHT: 178.8 LBS | HEIGHT: 69 IN | TEMPERATURE: 97.3 F | BODY MASS INDEX: 26.48 KG/M2 | SYSTOLIC BLOOD PRESSURE: 142 MMHG | RESPIRATION RATE: 16 BRPM

## 2023-08-14 DIAGNOSIS — Z12.5 SCREENING FOR PROSTATE CANCER: ICD-10-CM

## 2023-08-14 DIAGNOSIS — R73.01 ELEVATED FASTING GLUCOSE: ICD-10-CM

## 2023-08-14 DIAGNOSIS — I10 ESSENTIAL HYPERTENSION: ICD-10-CM

## 2023-08-14 DIAGNOSIS — E78.5 HYPERLIPIDEMIA, UNSPECIFIED HYPERLIPIDEMIA TYPE: ICD-10-CM

## 2023-08-14 DIAGNOSIS — N40.1 BENIGN PROSTATIC HYPERPLASIA WITH LOWER URINARY TRACT SYMPTOMS, SYMPTOM DETAILS UNSPECIFIED: ICD-10-CM

## 2023-08-14 DIAGNOSIS — R49.0 HOARSENESS: ICD-10-CM

## 2023-08-14 DIAGNOSIS — I35.1 AORTIC VALVE INSUFFICIENCY, ETIOLOGY OF CARDIAC VALVE DISEASE UNSPECIFIED: ICD-10-CM

## 2023-08-14 DIAGNOSIS — R09.82 POSTNASAL DRIP: ICD-10-CM

## 2023-08-14 DIAGNOSIS — Z00.00 MEDICARE ANNUAL WELLNESS VISIT, SUBSEQUENT: Primary | ICD-10-CM

## 2023-08-14 DIAGNOSIS — E03.8 SUBCLINICAL HYPOTHYROIDISM: ICD-10-CM

## 2023-08-14 PROCEDURE — G0439 PPPS, SUBSEQ VISIT: HCPCS | Performed by: FAMILY MEDICINE

## 2023-08-14 PROCEDURE — 99214 OFFICE O/P EST MOD 30 MIN: CPT | Performed by: FAMILY MEDICINE

## 2023-08-14 RX ORDER — BRIMONIDINE TARTRATE 2 MG/ML
SOLUTION/ DROPS OPHTHALMIC
COMMUNITY
Start: 2023-08-06

## 2023-08-14 NOTE — PATIENT INSTRUCTIONS
Medicare Preventive Visit Patient Instructions  Thank you for completing your Welcome to Medicare Visit or Medicare Annual Wellness Visit today. Your next wellness visit will be due in one year (8/14/2024). The screening/preventive services that you may require over the next 5-10 years are detailed below. Some tests may not apply to you based off risk factors and/or age. Screening tests ordered at today's visit but not completed yet may show as past due. Also, please note that scanned in results may not display below. Preventive Screenings:  Service Recommendations Previous Testing/Comments   Colorectal Cancer Screening  · Colonoscopy    · Fecal Occult Blood Test (FOBT)/Fecal Immunochemical Test (FIT)  · Fecal DNA/Cologuard Test  · Flexible Sigmoidoscopy Age: 43-73 years old   Colonoscopy: every 10 years (May be performed more frequently if at higher risk)  OR  FOBT/FIT: every 1 year  OR  Cologuard: every 3 years  OR  Sigmoidoscopy: every 5 years  Screening may be recommended earlier than age 39 if at higher risk for colorectal cancer. Also, an individualized decision between you and your healthcare provider will decide whether screening between the ages of 77-80 would be appropriate.  Colonoscopy: 09/01/2021  FOBT/FIT: Not on file  Cologuard: Not on file  Sigmoidoscopy: Not on file    Screening Current     Prostate Cancer Screening Individualized decision between patient and health care provider in men between ages of 53-66   Medicare will cover every 12 months beginning on the day after your 50th birthday PSA: 3.0 ng/mL     Screening Current     Hepatitis C Screening Once for adults born between 1945 and 1965  More frequently in patients at high risk for Hepatitis C Hep C Antibody: 03/12/2018    Screening Current   Diabetes Screening 1-2 times per year if you're at risk for diabetes or have pre-diabetes Fasting glucose: 107 mg/dL (8/7/2023)  A1C: 5.6 % (12/21/2022)  Screening Current   Cholesterol Screening Once every 5 years if you don't have a lipid disorder. May order more often based on risk factors. Lipid panel: 08/07/2023  Screening Not Indicated  History Lipid Disorder      Other Preventive Screenings Covered by Medicare:  1. Abdominal Aortic Aneurysm (AAA) Screening: covered once if your at risk. You're considered to be at risk if you have a family history of AAA or a male between the age of 70-76 who smoking at least 100 cigarettes in your lifetime. 2. Lung Cancer Screening: covers low dose CT scan once per year if you meet all of the following conditions: (1) Age 48-67; (2) No signs or symptoms of lung cancer; (3) Current smoker or have quit smoking within the last 15 years; (4) You have a tobacco smoking history of at least 20 pack years (packs per day x number of years you smoked); (5) You get a written order from a healthcare provider. 3. Glaucoma Screening: covered annually if you're considered high risk: (1) You have diabetes OR (2) Family history of glaucoma OR (3)  aged 48 and older OR (3)  American aged 72 and older  3. Osteoporosis Screening: covered every 2 years if you meet one of the following conditions: (1) Have a vertebral abnormality; (2) On glucocorticoid therapy for more than 3 months; (3) Have primary hyperparathyroidism; (4) On osteoporosis medications and need to assess response to drug therapy. 5. HIV Screening: covered annually if you're between the age of 14-79. Also covered annually if you are younger than 13 and older than 72 with risk factors for HIV infection. For pregnant patients, it is covered up to 3 times per pregnancy.     Immunizations:  Immunization Recommendations   Influenza Vaccine Annual influenza vaccination during flu season is recommended for all persons aged >= 6 months who do not have contraindications   Pneumococcal Vaccine   * Pneumococcal conjugate vaccine = PCV13 (Prevnar 13), PCV15 (Vaxneuvance), PCV20 (Prevnar 20)  * Pneumococcal polysaccharide vaccine = PPSV23 (Pneumovax) Adults 2364 years old: 1-3 doses may be recommended based on certain risk factors  Adults 72 years old: 1-2 doses may be recommended based off what pneumonia vaccine you previously received   Hepatitis B Vaccine 3 dose series if at intermediate or high risk (ex: diabetes, end stage renal disease, liver disease)   Tetanus (Td) Vaccine - COST NOT COVERED BY MEDICARE PART B Following completion of primary series, a booster dose should be given every 10 years to maintain immunity against tetanus. Td may also be given as tetanus wound prophylaxis. Tdap Vaccine - COST NOT COVERED BY MEDICARE PART B Recommended at least once for all adults. For pregnant patients, recommended with each pregnancy. Shingles Vaccine (Shingrix) - COST NOT COVERED BY MEDICARE PART B  2 shot series recommended in those aged 48 and above     Health Maintenance Due:      Topic Date Due   • Colorectal Cancer Screening  08/31/2026   • Hepatitis C Screening  Completed     Immunizations Due:      Topic Date Due   • COVID-19 Vaccine (7 - Moderna series) 09/10/2022   • Influenza Vaccine (1) 09/01/2023     Advance Directives   What are advance directives? Advance directives are legal documents that state your wishes and plans for medical care. These plans are made ahead of time in case you lose your ability to make decisions for yourself. Advance directives can apply to any medical decision, such as the treatments you want, and if you want to donate organs. What are the types of advance directives? There are many types of advance directives, and each state has rules about how to use them. You may choose a combination of any of the following:  · Living will: This is a written record of the treatment you want. You can also choose which treatments you do not want, which to limit, and which to stop at a certain time. This includes surgery, medicine, IV fluid, and tube feedings.    · Durable power of  for [facility]): This is a written record that states who you want to make healthcare choices for you when you are unable to make them for yourself. This person, called a proxy, is usually a family member or a friend. You may choose more than 1 proxy. · Do not resuscitate (DNR) order:  A DNR order is used in case your heart stops beating or you stop breathing. It is a request not to have certain forms of treatment, such as CPR. A DNR order may be included in other types of advance directives. · Medical directive: This covers the care that you want if you are in a coma, near death, or unable to make decisions for yourself. You can list the treatments you want for each condition. Treatment may include pain medicine, surgery, blood transfusions, dialysis, IV or tube feedings, and a ventilator (breathing machine). · Values history: This document has questions about your views, beliefs, and how you feel and think about life. This information can help others choose the care that you would choose. Why are advance directives important? An advance directive helps you control your care. Although spoken wishes may be used, it is better to have your wishes written down. Spoken wishes can be misunderstood, or not followed. Treatments may be given even if you do not want them. An advance directive may make it easier for your family to make difficult choices about your care. Weight Management   Why it is important to manage your weight:  Being overweight increases your risk of health conditions such as heart disease, high blood pressure, type 2 diabetes, and certain types of cancer. It can also increase your risk for osteoarthritis, sleep apnea, and other respiratory problems. Aim for a slow, steady weight loss. Even a small amount of weight loss can lower your risk of health problems. How to lose weight safely:  A safe and healthy way to lose weight is to eat fewer calories and get regular exercise.  You can lose up about 1 pound a week by decreasing the number of calories you eat by 500 calories each day. Healthy meal plan for weight management:  A healthy meal plan includes a variety of foods, contains fewer calories, and helps you stay healthy. A healthy meal plan includes the following:  · Eat whole-grain foods more often. A healthy meal plan should contain fiber. Fiber is the part of grains, fruits, and vegetables that is not broken down by your body. Whole-grain foods are healthy and provide extra fiber in your diet. Some examples of whole-grain foods are whole-wheat breads and pastas, oatmeal, brown rice, and bulgur. · Eat a variety of vegetables every day. Include dark, leafy greens such as spinach, kale, teo greens, and mustard greens. Eat yellow and orange vegetables such as carrots, sweet potatoes, and winter squash. · Eat a variety of fruits every day. Choose fresh or canned fruit (canned in its own juice or light syrup) instead of juice. Fruit juice has very little or no fiber. · Eat low-fat dairy foods. Drink fat-free (skim) milk or 1% milk. Eat fat-free yogurt and low-fat cottage cheese. Try low-fat cheeses such as mozzarella and other reduced-fat cheeses. · Choose meat and other protein foods that are low in fat. Choose beans or other legumes such as split peas or lentils. Choose fish, skinless poultry (chicken or turkey), or lean cuts of red meat (beef or pork). Before you cook meat or poultry, cut off any visible fat. · Use less fat and oil. Try baking foods instead of frying them. Add less fat, such as margarine, sour cream, regular salad dressing and mayonnaise to foods. Eat fewer high-fat foods. Some examples of high-fat foods include french fries, doughnuts, ice cream, and cakes. · Eat fewer sweets. Limit foods and drinks that are high in sugar. This includes candy, cookies, regular soda, and sweetened drinks.   Exercise:  Exercise at least 30 minutes per day on most days of the week. Some examples of exercise include walking, biking, dancing, and swimming. You can also fit in more physical activity by taking the stairs instead of the elevator or parking farther away from stores. Ask your healthcare provider about the best exercise plan for you. © Copyright [employer] 2018 Information is for End User's use only and may not be sold, redistributed or otherwise used for commercial purposes.  All illustrations and images included in CareNotes® are the copyrighted property of [employer] or  [address]

## 2023-08-14 NOTE — PROGRESS NOTES
Assessment and Plan:     Medicare annual wellness visit, subsequent  - discussed Shingrix  - PSA, Total Screen; Future    Essential hypertension  - BP is elevated today 142/82, patient states he is compliant with his BP medications, advised to continue losartan 100 mg daily, discussed low sodium diet and regular exercise, advised to monitor BP at home and bring BP log to the next visit  - Comprehensive metabolic panel; Future  - CBC and differential; Future    Hyperlipidemia  - , HDL 43, TG 98, continue atorvastatin 40 mg daily, diet/ exercise, will recheck lipids prior to next visit in 6 months  - Lipid Panel with Direct LDL reflex; Future  - Comprehensive metabolic panel; Future    Subclinical hypothyroidism  - TSH 3.111, within normal range, will continue monitoring and recheck TFT's in 1 year    Elevated fasting glucose  - reviewed dietary changes    Postnasal drip  - advised to use saline nasal spray 2 times a day    Hoarseness  - will refer to ENT for further evaluation  - Ambulatory Referral to Otolaryngology; Future    Aortic valve insufficiency    -   following with Cardiology      BPH  - following with Urology, on Ditropan    Return in 6 months or sooner as needed. The patient understands and agrees with the treatment plan.        Preventive health issues were discussed with patient, and age appropriate screening tests were ordered as noted in patient's After Visit Summary. Personalized health advice and appropriate referrals for health education or preventive services given if needed, as noted in patient's After Visit Summary. History of Present Illness:     Patient presents for a Medicare Wellness Visit and follow up for hypertension, hyperlipidemia. Patient reports postnasal drip and hoarseness on and off for the past few months, no trouble swallowing, no sore throat, no heartburn, no nasal congestion or runny nose. Patient offers no other complaints.        Patient Care Team:  Aria Cast Breanna Souza MD as PCP - MD Camila Burton, MD Sung Schilling MD as Endoscopist     Review of Systems:     Review of Systems   Constitutional: Negative for appetite change, chills, fatigue, fever and unexpected weight change. HENT: Positive for postnasal drip and voice change. Negative for congestion, sore throat and trouble swallowing. Respiratory: Negative for cough, shortness of breath and wheezing. Cardiovascular: Negative for chest pain, palpitations and leg swelling. Gastrointestinal: Negative for abdominal pain, blood in stool, constipation, diarrhea, nausea and vomiting. Genitourinary: Negative for dysuria, frequency and hematuria. Neurological: Negative for dizziness, syncope, weakness, numbness and headaches. Psychiatric/Behavioral: Negative for dysphoric mood and sleep disturbance. The patient is not nervous/anxious. Problem List:     Patient Active Problem List   Diagnosis   • Serrated adenoma of colon   • Aortic regurgitation   • Pure hypercholesterolemia   • Essential hypertension   • Bicuspid aortic valve   • H/O amaurosis fugax 2016      Past Medical and Surgical History:     Past Medical History:   Diagnosis Date   • Adenomatous colon polyp    • Aortic regurgitation    • Aortic stenosis    • Coronary artery disease Mytral valve, congenital   • Diverticulosis    • Heart murmur    • Hemorrhoids     Last Assessed: 11/10/17   • Hyperlipidemia    • Hypertension    • Serrated adenoma of colon    • Visual disturbances    • Visual impairment    • Vitamin D deficiency      Past Surgical History:   Procedure Laterality Date   • COLONOSCOPY     • COLONOSCOPY N/A 11/28/2017    Procedure: COLONOSCOPY with polypectomy, random bx, and hemorrhoid banding;  Surgeon: Trevin Cintron MD;  Location: [facility]; Service: General   • COLONOSCOPY N/A 2/26/2018    Procedure: COLONOSCOPY;  Surgeon: Enrique Tavarez MD;  Location: [facility];   Service: Gastroenterology   • MOUTH SURGERY     • [place] COLONOSCOPY FLX DX W/COLLJ SPEC WHEN PFRMD N/A 11/29/2016    Procedure: COLONOSCOPY POSSIBLE BANDING;  Surgeon: Enrique Hunter MD;  Location: [facility]; Service: General      Family History:     Family History   Problem Relation Age of Onset   • Asthma Mother    • Heart failure Mother         Congestive   • Depression Mother    • COPD Mother    • Alcohol abuse Father    • Stroke Father         CVA   • Thyroid disease Sister    • Heart attack Brother         Myocardial Infarction   • Depression Brother    • Stroke Family         Syndrome      Social History:     Social History     Socioeconomic History   • Marital status: /Civil Union     Spouse name: None   • Number of children: None   • Years of education: None   • Highest education level: None   Occupational History   • None   Tobacco Use   • Smoking status: Never   • Smokeless tobacco: Never   Vaping Use   • Vaping Use: Never used   Substance and Sexual Activity   • Alcohol use: Not Currently   • Drug use: No   • Sexual activity: Yes     Partners: Female   Other Topics Concern   • None   Social History Narrative    Caffeine use: 3 cups coffe per day    Has smoke detectors    Uses safety equipment: seatbelts     Social Determinants of Health     Financial Resource Strain: Low Risk  (8/10/2023)    Overall Financial Resource Strain (CARDIA)    • Difficulty of Paying Living Expenses: Not very hard   Food Insecurity: Not on file   Transportation Needs: No Transportation Needs (8/10/2023)    PRAPARE - Transportation    • Lack of Transportation (Medical): No    • Lack of Transportation (Non-Medical):  No   Physical Activity: Not on file   Stress: Not on file   Social Connections: Not on file   Intimate Partner Violence: Not on file   Housing Stability: Not on file      Medications and Allergies:     Current Outpatient Medications   Medication Sig Dispense Refill   • aspirin 81 MG tablet Take 81 mg by mouth daily     • atorvastatin (LIPITOR) 40 mg tablet TAKE 1 TABLET BY MOUTH EVERY DAY 90 tablet 3   • brimonidine tartrate 0.2 % ophthalmic solution INSTILL 1 DROP IN [facility] EYE TWO TIMES A DAY     • Cholecalciferol (VITAMIN D-400 PO) Take by mouth     • docusate sodium (COLACE) 50 mg capsule Take by mouth daily     • ketoconazole (NIZORAL) 2 % cream as needed      • losartan (COZAAR) 100 MG tablet TAKE 1 TABLET BY MOUTH EVERY DAY 90 tablet 3   • Multiple Vitamins-Minerals (CENTRUM SILVER PO) Take by mouth daily     • Omega-3 Fatty Acids (OMEGA 3 PO) Take by mouth daily       • oxybutynin (DITROPAN-XL) 10 MG 24 hr tablet Take 1 tablet (10 mg total) by mouth daily at bedtime 30 tablet 3     No current facility-administered medications for this visit. Allergies   Allergen Reactions   • Sulfa Antibiotics Rash     redness      Immunizations:     Immunization History   Administered Date(s) Administered   • COVID-19 MODERNA VACC 0.5 ML IM 03/05/2021, 03/05/2021, 04/03/2021, 04/03/2021, 11/16/2021, 11/16/2021, 05/10/2022   • Fluzone Split Quad 0.5 mL 11/19/2013   • INFLUENZA 11/19/2013, 09/27/2021, 10/17/2022   • Influenza Quadrivalent Preservative Free 3 years and older IM 09/11/2015   • Influenza Quadrivalent, 6-35 Months IM 11/10/2014, 09/09/2016   • Influenza Split High Dose Preservative Free IM 09/18/2017   • Influenza, high dose seasonal 0.7 mL 09/17/2018, 09/27/2019, 08/25/2020, 09/27/2021   • Influenza, seasonal, injectable 08/30/2011, 11/13/2012   • Pneumococcal Conjugate 13-Valent 03/10/2017   • Pneumococcal Polysaccharide PPV23 03/23/2018   • Tdap 08/12/2008, 06/15/2021      Health Maintenance:         Topic Date Due   • Colorectal Cancer Screening  08/31/2026   • Hepatitis C Screening  Completed         Topic Date Due   • COVID-19 Vaccine (7 - Moderna series) 09/10/2022   • Influenza Vaccine (1) 09/01/2023      Medicare Screening Tests and Risk Assessments:     Home [facility] is here for his Subsequent Wellness visit.      Health Risk Assessment:   Patient rates overall health as very good. Patient feels that their physical health rating is same. Patient is very satisfied with their life. Eyesight was rated as same. Hearing was rated as same. Patient feels that their emotional and mental health rating is same. Patients states they are never, rarely angry. Patient states they are sometimes unusually tired/fatigued. Pain experienced in the last 7 days has been none. Patient states that he has experienced no weight loss or gain in last 6 months. Depression Screening:   PHQ-2 Score: 0      Fall Risk Screening: In the past year, patient has experienced: no history of falling in past year      Home Safety:  Patient does not have trouble with stairs inside or outside of their home. Patient has working smoke alarms and has working carbon monoxide detector. Home safety hazards include: none. Nutrition:   Current diet is Regular. Medications:   Patient is currently taking over-the-counter supplements. OTC medications include: omega 3 fish oil, D3, multi vitamin. Patient is able to manage medications. Activities of Daily Living (ADLs)/Instrumental Activities of Daily Living (IADLs):   Walk and transfer into and out of bed and chair?: Yes  Dress and groom yourself?: Yes    Bathe or shower yourself?: Yes    Feed yourself? Yes  Do your laundry/housekeeping?: Yes  Manage your money, pay your bills and track your expenses?: Yes  Make your own meals?: Yes    Do your own shopping?: Yes    Previous Hospitalizations:   Any hospitalizations or ED visits within the last 12 months?: No      Advance Care Planning:   Living will: Yes    Durable POA for healthcare:  Yes    Advanced directive: Yes      Cognitive Screening:   Provider or family/friend/caregiver concerned regarding cognition?: No    PREVENTIVE SCREENINGS      Cardiovascular Screening:    General: Screening Not Indicated and History Lipid Disorder      Diabetes Screening:     General: Screening Current      Colorectal Cancer Screening:     General: Screening Current      Prostate Cancer Screening:    General: Screening Current      Osteoporosis Screening:    General: Patient Declines      Abdominal Aortic Aneurysm (AAA) Screening:    Risk factors include: age between 70-77 yo        General: Screening Not Indicated      Lung Cancer Screening:     General: Screening Not Indicated      Hepatitis C Screening:    General: Screening Current    Screening, Brief Intervention, and Referral to Treatment (SBIRT)    Screening  Typical number of drinks in a day: 0  Typical number of drinks in a week: 0  Interpretation: Low risk drinking behavior. AUDIT-C Screenin) How often did you have a drink containing alcohol in the past year? monthly or less  2) How many drinks did you have on a typical day when you were drinking in the past year? 1 to 2  3) How often did you have 6 or more drinks on one occasion in the past year? never    AUDIT-C Score: 1  Interpretation: Score 0-3 (male): Negative screen for alcohol misuse    Single Item Drug Screening:  How often have you used an illegal drug (including marijuana) or a prescription medication for non-medical reasons in the past year? never    Single Item Drug Screen Score: 0  Interpretation: Negative screen for possible drug use disorder       Physical Exam:     /96   Pulse 96   Temp (!) 97.3 °F (36.3 °C)   Resp 16   Ht 5' 9" (1.753 m)   Wt 81.1 kg (178 lb 12.8 oz)   BMI 26.40 kg/m²   BP Readings from Last 3 Encounters:   23 142/82   23 142/92   23 122/80     Wt Readings from Last 3 Encounters:   23 81.1 kg (178 lb 12.8 oz)   23 83 kg (183 lb)   23 82.8 kg (182 lb 9.6 oz)       Physical Exam  Constitutional:       General: He is not in acute distress. HENT:      Nose: No congestion. Mouth/Throat:      Mouth: Mucous membranes are moist.      Pharynx: Oropharynx is clear.    Cardiovascular:      Rate and Rhythm: Normal rate and regular rhythm. Heart sounds: Normal heart sounds. Pulmonary:      Effort: Pulmonary effort is normal.      Breath sounds: Normal breath sounds. No wheezing, rhonchi or rales. Abdominal:      Palpations: Abdomen is soft. There is no mass. Tenderness: There is no abdominal tenderness. Musculoskeletal:      Right lower leg: No edema. Left lower leg: No edema. Neurological:      Mental Status: He is alert and oriented to person, place, and time. Psychiatric:         Mood and Affect: Mood normal.         Behavior: Behavior normal.         Thought Content:  Thought content normal.        Lab Results   Component Value Date    CHOLESTEROL 189 08/07/2023    CHOLESTEROL 186 12/21/2022    CHOLESTEROL 189 05/11/2022     Lab Results   Component Value Date    HDL 43 08/07/2023    HDL 40 12/21/2022    HDL 43 05/11/2022     Lab Results   Component Value Date    TRIG 98 08/07/2023    TRIG 112 12/21/2022    TRIG 101 05/11/2022     Lab Results   Component Value Date    LDLCALC 126 (H) 08/07/2023     Lab Results   Component Value Date     09/01/2015    SODIUM 137 08/07/2023    K 4.7 08/07/2023     08/07/2023    CO2 27 08/07/2023    ANIONGAP 9 09/01/2015    AGAP 3 08/07/2023    BUN 11 08/07/2023    CREATININE 0.84 08/07/2023    GLUC 106 01/29/2018    GLUF 107 (H) 08/07/2023    CALCIUM 9.8 08/07/2023    AST 20 08/07/2023    ALT 35 08/07/2023    ALKPHOS 98 08/07/2023    PROT 7.5 09/01/2015    TP 7.9 08/07/2023    BILITOT 0.57 09/01/2015    TBILI 0.78 08/07/2023    EGFR 88 08/07/2023     Lab Results   Component Value Date    HGBA1C 5.6 12/21/2022     Lab Results   Component Value Date    PSA 3.0 12/21/2022    PSA 2.4 11/01/2021    PSA 2.3 11/09/2020     Lab Results   Component Value Date    WBC 6.45 12/21/2022    HGB 16.2 12/21/2022    HCT 48.2 12/21/2022    MCV 94 12/21/2022     12/21/2022       Ofe Murguia MD

## 2023-08-16 ENCOUNTER — TELEPHONE (OUTPATIENT)
Dept: CARDIAC SURGERY | Facility: CLINIC | Age: 73
End: 2023-08-16

## 2023-08-16 ENCOUNTER — OFFICE VISIT (OUTPATIENT)
Dept: UROLOGY | Facility: HOSPITAL | Age: 73
End: 2023-08-16
Payer: MEDICARE

## 2023-08-16 VITALS
WEIGHT: 176 LBS | HEART RATE: 82 BPM | BODY MASS INDEX: 25.99 KG/M2 | OXYGEN SATURATION: 98 % | SYSTOLIC BLOOD PRESSURE: 146 MMHG | DIASTOLIC BLOOD PRESSURE: 80 MMHG

## 2023-08-16 DIAGNOSIS — N32.81 OAB (OVERACTIVE BLADDER): ICD-10-CM

## 2023-08-16 DIAGNOSIS — N40.1 BENIGN PROSTATIC HYPERPLASIA WITH LOWER URINARY TRACT SYMPTOMS, SYMPTOM DETAILS UNSPECIFIED: Primary | ICD-10-CM

## 2023-08-16 LAB — POST-VOID RESIDUAL VOLUME, ML POC: 52 ML

## 2023-08-16 PROCEDURE — 99213 OFFICE O/P EST LOW 20 MIN: CPT | Performed by: NURSE PRACTITIONER

## 2023-08-16 PROCEDURE — 51798 US URINE CAPACITY MEASURE: CPT | Performed by: NURSE PRACTITIONER

## 2023-08-16 RX ORDER — OXYBUTYNIN CHLORIDE 10 MG/1
10 TABLET, EXTENDED RELEASE ORAL
Qty: 90 TABLET | Refills: 3 | Status: SHIPPED | OUTPATIENT
Start: 2023-08-16 | End: 2023-09-15

## 2023-08-16 RX ORDER — OXYBUTYNIN CHLORIDE 10 MG/1
10 TABLET, EXTENDED RELEASE ORAL
Qty: 30 TABLET | Refills: 11 | Status: SHIPPED | OUTPATIENT
Start: 2023-08-16 | End: 2023-08-16 | Stop reason: SDUPTHER

## 2023-08-16 NOTE — TELEPHONE ENCOUNTER
Patient called to schedule his echo and 1 year f/u. Requested c/s # to schedule his echo with c/s. Was able to schedule his 1 year in December.

## 2023-08-16 NOTE — PROGRESS NOTES
8/16/2023    Jillian Rios  1950  [id number]      Assessment  -BPH with lower urinary tract symptoms  -Overactive bladder    Discussion/Plan  Ravi Meza is a 71 y.o. male being managed by our office    1. BPH with lower urinary tract symptoms, OAB-  PVR in the office today is 52 mL. He notes improvement in urinary symptoms since starting oxybutynin. Patient will continue to take daily. Refill sent to pharmacy. Reviewed dietary and behavioral modifications. Next PSA, ordered by PCP due in December 2023. Follow up in 1 year with RUSS and PVR assessment. He was advised to call sooner with any questions or issues.    -All questions answered, patient agrees with plan      History of Present Illness  71 y.o. male with a history of BPH and OAB presents today for follow up. Patient last seen in the office in May 2023. He was prescribed oxybutynin 10mg daily for symptoms of urinary frequency and urgency. Patient was unable to tolerate tamsulosin secondary to side effects. He reports significant improvement in urinary symptoms. Patient feels he is emptying to completion. No gross hematuria or dysuria. Last PSA from 12/21/22 was 3.0. PSAs ordered by PCP. RUSS at last visit was unremarkable. Component      Latest Ref Rng 9/10/2018 9/17/2019 11/9/2020 11/1/2021 12/21/2022   PSA, Total      0.0 - 4.0 ng/mL 2.2  2.6  2.3  2.4  3.0        Review of Systems  Review of Systems   Constitutional: Negative. HENT: Negative. Respiratory: Negative. Cardiovascular: Negative. Gastrointestinal: Negative. Genitourinary: Negative for decreased urine volume, difficulty urinating, dysuria, flank pain, frequency, hematuria and urgency. Musculoskeletal: Negative. Skin: Negative. Neurological: Negative. Psychiatric/Behavioral: Negative.       AUA SYMPTOM SCORE    Flowsheet Row Most Recent Value   AUA SYMPTOM SCORE    How often have you had a sensation of not emptying your bladder completely after you finished urinating? 0 (P)     How often have you had to urinate again less than two hours after you finished urinating? 1 (P)     How often have you found you stopped and started again several times when you urinate? 0 (P)     How often have you found it difficult to postpone urination? 2 (P)     How often have you had a weak urinary stream? 0 (P)     How often have you had to push or strain to begin urination? 0 (P)     How many times did you most typically get up to urinate from the time you went to bed at night until the time you got up in the morning? 1 (P)     Quality of Life: If you were to spend the rest of your life with your urinary condition just the way it is now, how would you feel about that? 2 (P)     AUA SYMPTOM SCORE 4 (P)           Past Medical History  Past Medical History:   Diagnosis Date   • Adenomatous colon polyp    • Aortic regurgitation    • Aortic stenosis    • Coronary artery disease Mytral valve, congenital   • Diverticulosis    • Heart murmur    • Hemorrhoids     Last Assessed: 11/10/17   • Hyperlipidemia    • Hypertension    • Serrated adenoma of colon    • Visual disturbances    • Visual impairment    • Vitamin D deficiency        Past Social History  Past Surgical History:   Procedure Laterality Date   • COLONOSCOPY     • COLONOSCOPY N/A 11/28/2017    Procedure: COLONOSCOPY with polypectomy, random bx, and hemorrhoid banding;  Surgeon: Atanacio Bamberger, MD;  Location: [facility]; Service: General   • COLONOSCOPY N/A 2/26/2018    Procedure: COLONOSCOPY;  Surgeon: Vanesa Torres MD;  Location: [facility]; Service: Gastroenterology   • MOUTH SURGERY     • [place] COLONOSCOPY FLX DX W/COLLJ SPEC WHEN PFRMD N/A 11/29/2016    Procedure: COLONOSCOPY POSSIBLE BANDING;  Surgeon: Atanacio Bamberger, MD;  Location: [facility];   Service: General       Past Family History  Family History   Problem Relation Age of Onset   • Asthma Mother    • Heart failure Mother         Congestive   • Depression Mother • COPD Mother    • Alcohol abuse Father    • Stroke Father         CVA   • Thyroid disease Sister    • Heart attack Brother         Myocardial Infarction   • Depression Brother    • Stroke Family         Syndrome       Past Social history  Social History     Socioeconomic History   • Marital status: /Civil Union     Spouse name: Not on file   • Number of children: Not on file   • Years of education: Not on file   • Highest education level: Not on file   Occupational History   • Not on file   Tobacco Use   • Smoking status: Never   • Smokeless tobacco: Never   Vaping Use   • Vaping Use: Never used   Substance and Sexual Activity   • Alcohol use: Not Currently   • Drug use: No   • Sexual activity: Yes     Partners: Female   Other Topics Concern   • Not on file   Social History Narrative    Caffeine use: 3 cups coffe per day    Has smoke detectors    Uses safety equipment: seatbelts     Social Determinants of Health     Financial Resource Strain: Low Risk  (8/10/2023)    Overall Financial Resource Strain (CARDIA)    • Difficulty of Paying Living Expenses: Not very hard   Food Insecurity: Not on file   Transportation Needs: No Transportation Needs (8/10/2023)    PRAPARE - Transportation    • Lack of Transportation (Medical): No    • Lack of Transportation (Non-Medical):  No   Physical Activity: Not on file   Stress: Not on file   Social Connections: Not on file   Intimate Partner Violence: Not on file   Housing Stability: Not on file       Current Medications  Current Outpatient Medications   Medication Sig Dispense Refill   • aspirin 81 MG tablet Take 81 mg by mouth daily     • atorvastatin (LIPITOR) 40 mg tablet TAKE 1 TABLET BY MOUTH EVERY DAY 90 tablet 3   • brimonidine tartrate 0.2 % ophthalmic solution INSTILL 1 DROP IN [facility] EYE TWO TIMES A DAY     • Cholecalciferol (VITAMIN D-400 PO) Take by mouth     • docusate sodium (COLACE) 50 mg capsule Take by mouth daily     • ketoconazole (NIZORAL) 2 % cream as needed      • losartan (COZAAR) 100 MG tablet TAKE 1 TABLET BY MOUTH EVERY DAY 90 tablet 3   • Multiple Vitamins-Minerals (CENTRUM SILVER PO) Take by mouth daily     • Omega-3 Fatty Acids (OMEGA 3 PO) Take by mouth daily       • oxybutynin (DITROPAN-XL) 10 MG 24 hr tablet Take 1 tablet (10 mg total) by mouth daily at bedtime 30 tablet 3     No current facility-administered medications for this visit. Allergies  Allergies   Allergen Reactions   • Sulfa Antibiotics Rash     redness       Past Medical History, Social History, Family History, medications and allergies were reviewed. Vitals  Vitals:    08/16/23 0938   BP: 146/80   BP Location: Left arm   Patient Position: Sitting   Cuff Size: Adult   Pulse: 82   SpO2: 98%   Weight: 79.8 kg (176 lb)       Physical Exam  Physical Exam  Constitutional:       Appearance: Normal appearance. He is well-developed. HENT:      Head: Normocephalic. Eyes:      Pupils: Pupils are equal, round, and reactive to light. Pulmonary:      Effort: Pulmonary effort is normal.   Abdominal:      Palpations: Abdomen is soft. Musculoskeletal:         General: Normal range of motion. Cervical back: Normal range of motion. Skin:     General: Skin is warm and dry. Neurological:      General: No focal deficit present. Mental Status: He is alert and oriented to person, place, and time. Psychiatric:         Mood and Affect: Mood normal.         Behavior: Behavior normal.         Thought Content:  Thought content normal.         Judgment: Judgment normal.         Results    I have personally reviewed all pertinent lab results and reviewed with patient  Lab Results   Component Value Date    PSA 3.0 12/21/2022    PSA 2.4 11/01/2021    PSA 2.3 11/09/2020     Lab Results   Component Value Date    GLUCOSE 94 09/01/2015    CALCIUM 9.8 08/07/2023     09/01/2015    K 4.7 08/07/2023    CO2 27 08/07/2023     08/07/2023    BUN 11 08/07/2023    CREATININE 0.84 08/07/2023 Lab Results   Component Value Date    WBC 6.45 12/21/2022    HGB 16.2 12/21/2022    HCT 48.2 12/21/2022    MCV 94 12/21/2022     12/21/2022     Recent Results (from the past 1 hour(s))   POCT Measure PVR    Collection Time: 08/16/23  9:41 AM   Result Value Ref Range    POST-VOID RESIDUAL VOLUME, ML POC 52 mL

## 2023-08-21 ENCOUNTER — HOSPITAL ENCOUNTER (OUTPATIENT)
Dept: NON INVASIVE DIAGNOSTICS | Facility: CLINIC | Age: 73
Discharge: HOME | End: 2023-08-21
Payer: MEDICARE

## 2023-08-21 VITALS
WEIGHT: 176 LBS | SYSTOLIC BLOOD PRESSURE: 146 MMHG | BODY MASS INDEX: 26.07 KG/M2 | HEIGHT: 69 IN | DIASTOLIC BLOOD PRESSURE: 80 MMHG | HEART RATE: 70 BPM

## 2023-08-21 DIAGNOSIS — I35.1 AORTIC VALVE INSUFFICIENCY, ETIOLOGY OF CARDIAC VALVE DISEASE UNSPECIFIED: ICD-10-CM

## 2023-08-21 LAB
AORTIC ROOT: 2.7 CM
AORTIC VALVE MEAN VELOCITY: 11.2 M/S
APICAL FOUR CHAMBER EJECTION FRACTION: 62 %
AV AREA BY CONTINUOUS VTI: 1.8 CM2
AV AREA PEAK VELOCITY: 1.9 CM2
AV LVOT MEAN GRADIENT: 2 MMHG
AV LVOT PEAK GRADIENT: 4 MMHG
AV MEAN GRADIENT: 6 MMHG
AV PEAK GRADIENT: 11 MMHG
AV REGURGITATION PRESSURE HALF TIME: 1310 MS
AV VALVE AREA: 1.83 CM2
AV VELOCITY RATIO: 0.61
DOP CALC AO PEAK VEL: 1.66 M/S
DOP CALC AO VTI: 36.21 CM
DOP CALC LVOT AREA: 3.14 CM2
DOP CALC LVOT CARDIAC INDEX: 1.86 L/MIN/M2
DOP CALC LVOT CARDIAC OUTPUT: 3.64 L/MIN
DOP CALC LVOT DIAMETER: 2 CM
DOP CALC LVOT PEAK VEL VTI: 21.08 CM
DOP CALC LVOT PEAK VEL: 1.02 M/S
DOP CALC LVOT STROKE INDEX: 35.2 ML/M2
DOP CALC LVOT STROKE VOLUME: 66.19 CM3
E WAVE DECELERATION TIME: 242 MS
FRACTIONAL SHORTENING: 28 % (ref 28–44)
INTERVENTRICULAR SEPTUM IN DIASTOLE (PARASTERNAL SHORT AXIS VIEW): 1.3 CM
INTERVENTRICULAR SEPTUM: 1.3 CM (ref 0.6–1.1)
LAAS-AP2: 14.4 CM2
LAAS-AP4: 15.8 CM2
LEFT ATRIUM AREA SYSTOLE SINGLE PLANE A4C: 13.2 CM2
LEFT ATRIUM SIZE: 3.3 CM
LEFT ATRIUM VOLUME (MOD BIPLANE): 39 ML
LEFT INTERNAL DIMENSION IN SYSTOLE: 2.9 CM (ref 2.1–4)
LEFT VENTRICULAR INTERNAL DIMENSION IN DIASTOLE: 4 CM (ref 3.5–6)
LEFT VENTRICULAR POSTERIOR WALL IN END DIASTOLE: 1.3 CM
LEFT VENTRICULAR STROKE VOLUME: 40 ML
LVSV (TEICH): 40 ML
MV E'TISSUE VEL-SEP: 9 CM/S
MV PEAK A VEL: 0.71 M/S
MV PEAK E VEL: 50 CM/S
MV STENOSIS PRESSURE HALF TIME: 70 MS
MV VALVE AREA P 1/2 METHOD: 3.14 CM2
RIGHT ATRIUM AREA SYSTOLE A4C: 10.8 CM2
RIGHT VENTRICLE ID DIMENSION: 4 CM
SL CV AV DECELERATION TIME RETROGRADE: 4518 MS
SL CV AV PEAK GRADIENT RETROGRADE: 68 MMHG
SL CV LEFT ATRIUM LENGTH A2C: 4.6 CM
SL CV LV EF: 55
SL CV PED ECHO LEFT VENTRICLE DIASTOLIC VOLUME (MOD BIPLANE) 2D: 72 ML
SL CV PED ECHO LEFT VENTRICLE SYSTOLIC VOLUME (MOD BIPLANE) 2D: 31 ML
TRICUSPID ANNULAR PLANE SYSTOLIC EXCURSION: 2 CM

## 2023-08-21 PROCEDURE — 93306 TTE W/DOPPLER COMPLETE: CPT

## 2023-08-21 PROCEDURE — 93306 TTE W/DOPPLER COMPLETE: CPT | Performed by: INTERNAL MEDICINE

## 2023-09-21 ENCOUNTER — HOSPITAL ENCOUNTER (OUTPATIENT)
Dept: RADIOLOGY | Facility: HOSPITAL | Age: 73
Discharge: HOME | End: 2023-09-21
Attending: OTOLARYNGOLOGY
Payer: MEDICARE

## 2023-09-21 DIAGNOSIS — R09.82 PND (POST-NASAL DRIP): ICD-10-CM

## 2023-09-21 DIAGNOSIS — R49.0 HOARSENESS: ICD-10-CM

## 2023-09-21 PROCEDURE — 74220 X-RAY XM ESOPHAGUS 1CNTRST: CPT

## 2023-10-19 ENCOUNTER — NURSE TRIAGE (OUTPATIENT)
Age: 73
End: 2023-10-19

## 2023-10-19 DIAGNOSIS — N50.89 SCROTAL SWELLING: Primary | ICD-10-CM

## 2023-10-19 NOTE — TELEPHONE ENCOUNTER
Regarding: Swelling in left testicle  ----- Message from [facility] sent at 10/19/2023  8:29 AM EDT -----  Patient calling for recommendations. Patient stated he is having swelling in his left testicle that has been on and off since the end of August. He is not having any pain.     Patient requesting a call back at [phone]

## 2023-10-19 NOTE — TELEPHONE ENCOUNTER
thanks agree.  i added a scrotal US to be done before he sees me 11/9  suspect hydrocele from painless swelling description

## 2023-10-20 DIAGNOSIS — N50.89 SCROTAL SWELLING: Primary | ICD-10-CM

## 2023-10-20 NOTE — TELEPHONE ENCOUNTER
Contacted and spoke with the patient to let him know Renetta Tripathi PA-C placed an order in his chart for a scrotal US. Patient has Central Scheduling number and will call to schedule.

## 2023-10-20 NOTE — TELEPHONE ENCOUNTER
Contacted and spoke with the patient to let him now the order for scrotal US has been placed in his chart

## 2023-10-20 NOTE — TELEPHONE ENCOUNTER
Pt called to let us know that the order for the U/S is not in his chart. I explained it may take some time for the order to be placed. However, I would place the encounter as he was told in the VM the order was placed.      Pt req cb to confirm order was placed [phone]

## 2023-10-25 ENCOUNTER — APPOINTMENT (OUTPATIENT)
Dept: LAB | Facility: CLINIC | Age: 73
End: 2023-10-25
Payer: MEDICARE

## 2023-10-25 DIAGNOSIS — N50.89 SCROTAL SWELLING: ICD-10-CM

## 2023-10-25 LAB
BACTERIA UR QL AUTO: NORMAL /HPF
BILIRUB UR QL STRIP: NEGATIVE
CLARITY UR: CLEAR
COLOR UR: NORMAL
GLUCOSE UR STRIP-MCNC: NEGATIVE MG/DL
HGB UR QL STRIP.AUTO: NEGATIVE
KETONES UR STRIP-MCNC: NEGATIVE MG/DL
LEUKOCYTE ESTERASE UR QL STRIP: NEGATIVE
NITRITE UR QL STRIP: NEGATIVE
NON-SQ EPI CELLS URNS QL MICRO: NORMAL /HPF
PH UR STRIP.AUTO: 6.5 [PH]
PROT UR STRIP-MCNC: NEGATIVE MG/DL
RBC #/AREA URNS AUTO: NORMAL /HPF
SP GR UR STRIP.AUTO: 1.01 (ref 1–1.03)
UROBILINOGEN UR STRIP-ACNC: <2 MG/DL
WBC #/AREA URNS AUTO: NORMAL /HPF

## 2023-10-25 PROCEDURE — 87086 URINE CULTURE/COLONY COUNT: CPT

## 2023-10-26 ENCOUNTER — HOSPITAL ENCOUNTER (OUTPATIENT)
Dept: ULTRASOUND IMAGING | Facility: HOSPITAL | Age: 73
Discharge: HOME | End: 2023-10-26
Payer: MEDICARE

## 2023-10-26 DIAGNOSIS — N50.89 SCROTAL SWELLING: ICD-10-CM

## 2023-10-26 LAB — BACTERIA UR CULT: NORMAL

## 2023-10-26 PROCEDURE — 76870 US EXAM SCROTUM: CPT

## 2023-11-09 ENCOUNTER — OFFICE VISIT (OUTPATIENT)
Dept: UROLOGY | Facility: CLINIC | Age: 73
End: 2023-11-09
Payer: MEDICARE

## 2023-11-09 VITALS
HEIGHT: 69 IN | WEIGHT: 181 LBS | HEART RATE: 63 BPM | SYSTOLIC BLOOD PRESSURE: 132 MMHG | BODY MASS INDEX: 26.81 KG/M2 | DIASTOLIC BLOOD PRESSURE: 88 MMHG | OXYGEN SATURATION: 97 %

## 2023-11-09 DIAGNOSIS — N50.89 SCROTAL SWELLING: ICD-10-CM

## 2023-11-09 DIAGNOSIS — R35.1 NOCTURIA: Primary | ICD-10-CM

## 2023-11-09 PROCEDURE — 99214 OFFICE O/P EST MOD 30 MIN: CPT | Performed by: PHYSICIAN ASSISTANT

## 2023-11-09 NOTE — PROGRESS NOTES
11/9/2023      Chief Complaint   Patient presents with    Follow-up     Left testicular swelling no pain         Assessment and Plan    71 y.o. male managed by [facility] Team    1. Left scrotal hemangiomas  2. Nocturia    Reassured him with both the ultrasound and the exam there is no hydrocele no hernia no varicocele. The testes are normal bilaterally. Small epididymal head cyst is not palpable on exam.  Few small pinpoint hemangiomas nothing to do for that then avoid scratching them so they do not bleed. He will monitor his scrotal symptoms and his voiding symptoms until next year's annual visit. No meds at this time. History of Present Illness  Rio Chacon is a 71 y.o. male here for evaluation of left scrotal swelling for a couple of months. He noted when sitting for bowel movement that the scrotum touched the water in the toilet bowl on two occasions which what brought this up. It increases and decreases randomly. There is no pain or bruising. He wore briefs to bed for a few weeks and this helped dramatically. he still wears during the day but questions if he can go back to no underwear for bedtime yet. No new voiding symptoms. He stopped the oxybutynin due to dry mouth. Some nights he wakes a time or two some nights not at all. He will just live with it instead. Urinalysis and culture are unremarkable, scrotal testicular ultrasound shows a small left epididymal head cyst, no varicocele and no significant hydrocele. Review of Systems   Constitutional: Negative. Respiratory: Negative. Cardiovascular: Negative. Genitourinary:  Negative for decreased urine volume, difficulty urinating, dysuria, flank pain, frequency, hematuria, penile swelling, scrotal swelling, testicular pain and urgency. Musculoskeletal: Negative.             AUA SYMPTOM SCORE      Flowsheet Row Most Recent Value   AUA SYMPTOM SCORE    How often have you had a sensation of not emptying your bladder completely after you finished urinating? 0 (P)     How often have you had to urinate again less than two hours after you finished urinating? 2 (P)     How often have you found you stopped and started again several times when you urinate? 0 (P)     How often have you found it difficult to postpone urination? 3 (P)     How often have you had a weak urinary stream? 0 (P)     How often have you had to push or strain to begin urination? 0 (P)     How many times did you most typically get up to urinate from the time you went to bed at night until the time you got up in the morning? 2 (P)     Quality of Life: If you were to spend the rest of your life with your urinary condition just the way it is now, how would you feel about that? 3 (P)     AUA SYMPTOM SCORE 7 (P)              Vitals  Vitals:    11/09/23 1306   BP: 132/88   BP Location: Left arm   Patient Position: Sitting   Cuff Size: Adult   Pulse: 63   SpO2: 97%   Weight: 82.1 kg (181 lb)   Height: 5' 9" (1.753 m)       Physical Exam  Vitals and nursing note reviewed. Constitutional:       General: He is not in acute distress. Appearance: Normal appearance. He is well-developed. He is not diaphoretic. HENT:      Head: Normocephalic and atraumatic. Pulmonary:      Effort: Pulmonary effort is normal.      Comments: No cough or audible wheeze  Abdominal:      General: There is no distension. Tenderness: There is no abdominal tenderness. There is no right CVA tenderness or left CVA tenderness. Genitourinary:     Comments: Circumcised penis, normal phallus, orthotopic patent meatus. Testes smooth descended bilaterally into the scrotum nontender with no palpable mass. No hydrocele or varicocele. Scrotal skin is lax. Few pinpoint flat hemangiomata of scrotal skin    Musculoskeletal:      Right lower leg: No edema. Left lower leg: No edema. Skin:     General: Skin is warm and dry. Neurological:      Mental Status: He is alert and oriented to person, place, and time. Gait: Gait normal.   Psychiatric:         Speech: Speech normal.         Behavior: Behavior normal.           Past History  Past Medical History:   Diagnosis Date    Adenomatous colon polyp     Aortic regurgitation     Aortic stenosis     Coronary artery disease Mytral valve, congenital    Diverticulosis     Heart murmur     Hemorrhoids     Last Assessed: 11/10/17    Hyperlipidemia     Hypertension     Serrated adenoma of colon     Visual disturbances     Visual impairment     Vitamin D deficiency      Social History     Socioeconomic History    Marital status: /Civil Union     Spouse name: None    Number of children: None    Years of education: None    Highest education level: None   Occupational History    None   Tobacco Use    Smoking status: Never    Smokeless tobacco: Never   Vaping Use    Vaping Use: Never used   Substance and Sexual Activity    Alcohol use: Not Currently    Drug use: No    Sexual activity: Yes     Partners: Female   Other Topics Concern    None   Social History Narrative    Caffeine use: 3 cups coffe per day    Has smoke detectors    Uses safety equipment: seatbelts     Social Determinants of Health     Financial Resource Strain: Low Risk  (8/10/2023)    Overall Financial Resource Strain (CARDIA)     Difficulty of Paying Living Expenses: Not very hard   Food Insecurity: Not on file   Transportation Needs: No Transportation Needs (8/10/2023)    PRAPARE - Transportation     Lack of Transportation (Medical): No     Lack of Transportation (Non-Medical):  No   Physical Activity: Not on file   Stress: Not on file   Social Connections: Not on file   Intimate Partner Violence: Not on file   Housing Stability: Not on file     Social History     Tobacco Use   Smoking Status Never   Smokeless Tobacco Never     Family History   Problem Relation Age of Onset    Asthma Mother     Heart failure Mother         Congestive    Depression Mother     COPD Mother     Alcohol abuse Father     Stroke Father CVA    Thyroid disease Sister     Heart attack Brother         Myocardial Infarction    Depression Brother     Stroke Family         Syndrome       The following portions of the patient's history were reviewed and updated as appropriate: allergies, current medications, past medical history, past social history, past surgical history and problem list.    Results  No results found for this or any previous visit (from the past 1 hour(s)).]  Lab Results   Component Value Date    PSA 3.0 12/21/2022    PSA 2.4 11/01/2021    PSA 2.3 11/09/2020    PSA 2.6 09/17/2019     Lab Results   Component Value Date    GLUCOSE 94 09/01/2015    CALCIUM 9.8 08/07/2023     09/01/2015    K 4.7 08/07/2023    CO2 27 08/07/2023     08/07/2023    BUN 11 08/07/2023    CREATININE 0.84 08/07/2023     Lab Results   Component Value Date    WBC 6.45 12/21/2022    HGB 16.2 12/21/2022    HCT 48.2 12/21/2022    MCV 94 12/21/2022     12/21/2022

## 2023-12-15 ENCOUNTER — OFFICE VISIT (OUTPATIENT)
Dept: CARDIAC SURGERY | Facility: CLINIC | Age: 73
End: 2023-12-15
Payer: MEDICARE

## 2023-12-15 VITALS
HEIGHT: 69 IN | DIASTOLIC BLOOD PRESSURE: 74 MMHG | SYSTOLIC BLOOD PRESSURE: 116 MMHG | OXYGEN SATURATION: 97 % | BODY MASS INDEX: 26.66 KG/M2 | WEIGHT: 180 LBS | HEART RATE: 51 BPM

## 2023-12-15 DIAGNOSIS — I35.1 AORTIC VALVE INSUFFICIENCY, ETIOLOGY OF CARDIAC VALVE DISEASE UNSPECIFIED: Primary | ICD-10-CM

## 2023-12-15 DIAGNOSIS — Q23.1 BICUSPID AORTIC VALVE: ICD-10-CM

## 2023-12-15 DIAGNOSIS — E78.00 PURE HYPERCHOLESTEROLEMIA: ICD-10-CM

## 2023-12-15 DIAGNOSIS — I77.810 DILATED AORTIC ROOT (HCC): ICD-10-CM

## 2023-12-15 PROCEDURE — 93000 ELECTROCARDIOGRAM COMPLETE: CPT | Performed by: INTERNAL MEDICINE

## 2023-12-15 PROCEDURE — 99213 OFFICE O/P EST LOW 20 MIN: CPT | Performed by: INTERNAL MEDICINE

## 2023-12-15 NOTE — PROGRESS NOTES
Cardiology Follow Up Visit    Kamala Maddox  1950  [id number]  [facility]  [address] [address] [phone]    1. Aortic valve insufficiency, etiology of cardiac valve disease unspecified  POCT ECG    Echo complete w/ contrast if indicated            Discussion/Summary:       1. History of bicuspid aortic valve with 1 to 2+ aortic valve regurgitation, no interval change echocardiogram this year. No significant aortic valve stenosis     2. Essential hypertension, controlled     3. Mild ascending aortic root upper normal/mildly dilated, echo this year measurement wnl     4. Hyperlipidemia, on atorvastatin 40 mg daily, primary prevention, 's  5. Remote hx amaurosis fugax, 2016, carotid duplex no stenosis    Plan:  Patient 1 to 2+/ mild-to-moderate aortic valve regurgitation stable by echocardiogram this year. Feels well. Sensible diet/exercise encouraged. No med changes. Echo with next years visit      Interval History:    71-year-old male follow-up bicuspid aortic valve with 1-2+ aortic valve regurgitation     No significant aortic valve stenosis     Essential hypertension which has been well controlled      Top normal diameter of ascending aorta  Echocardiogram October 2021. 2023 normal measurements    Hyperlipidemia with LDL 120mg on atorva 40mg. Remote history of amaurosis fugax 2016, carotid duplex no stenosis. Feels well. No complaints.     Patient Active Problem List   Diagnosis    Serrated adenoma of colon    Aortic regurgitation    Pure hypercholesterolemia    Essential hypertension    Bicuspid aortic valve    H/O amaurosis fugax 2016     Past Medical History:   Diagnosis Date    Adenomatous colon polyp     Aortic regurgitation     Aortic stenosis     Coronary artery disease Mytral valve, congenital    Diverticulosis     Heart murmur     Hemorrhoids     Last Assessed: 11/10/17    Hyperlipidemia     Hypertension     Serrated adenoma of colon     Visual disturbances     Visual impairment     Vitamin D deficiency      Social History     Socioeconomic History    Marital status: /Civil Union     Spouse name: Not on file    Number of children: Not on file    Years of education: Not on file    Highest education level: Not on file   Occupational History    Not on file   Tobacco Use    Smoking status: Never    Smokeless tobacco: Never   Vaping Use    Vaping status: Never Used   Substance and Sexual Activity    Alcohol use: Not Currently    Drug use: No    Sexual activity: Yes     Partners: Female   Other Topics Concern    Not on file   Social History Narrative    Caffeine use: 3 cups coffe per day    Has smoke detectors    Uses safety equipment: seatbelts     Social Determinants of Health     Financial Resource Strain: Low Risk  (8/10/2023)    Overall Financial Resource Strain (CARDIA)     Difficulty of Paying Living Expenses: Not very hard   Food Insecurity: Not on file   Transportation Needs: No Transportation Needs (8/10/2023)    PRAPARE - Transportation     Lack of Transportation (Medical): No     Lack of Transportation (Non-Medical):  No   Physical Activity: Not on file   Stress: Not on file   Social Connections: Not on file   Intimate Partner Violence: Not on file   Housing Stability: Not on file      Family History   Problem Relation Age of Onset    Asthma Mother     Heart failure Mother         Congestive    Depression Mother     COPD Mother     Alcohol abuse Father     Stroke Father         CVA    Thyroid disease Sister     Heart attack Brother         Myocardial Infarction    Depression Brother     Stroke Family         Syndrome     Past Surgical History:   Procedure Laterality Date    COLONOSCOPY      COLONOSCOPY N/A 11/28/2017    Procedure: COLONOSCOPY with polypectomy, random bx, and hemorrhoid banding;  Surgeon: Samantha Wade MD;  Location: [facility]; Service: General    COLONOSCOPY N/A 2/26/2018    Procedure: COLONOSCOPY;  Surgeon: Silvina Gray MD;  Location: [facility]; Service: Gastroenterology    MOUTH SURGERY      CT COLONOSCOPY FLX DX W/COLLJ SPEC WHEN PFRMD N/A 11/29/2016    Procedure: COLONOSCOPY POSSIBLE BANDING;  Surgeon: Virginia Yao MD;  Location: [facility];   Service: General       Current Outpatient Medications:     aspirin 81 MG tablet, Take 81 mg by mouth daily, Disp: , Rfl:     atorvastatin (LIPITOR) 40 mg tablet, TAKE 1 TABLET BY MOUTH EVERY DAY, Disp: 90 tablet, Rfl: 3    brimonidine tartrate 0.2 % ophthalmic solution, INSTILL 1 DROP IN EACH EYE TWO TIMES A DAY, Disp: , Rfl:     Cholecalciferol (VITAMIN D-400 PO), Take by mouth, Disp: , Rfl:     docusate sodium (COLACE) 50 mg capsule, Take by mouth daily, Disp: , Rfl:     ketoconazole (NIZORAL) 2 % cream, as needed , Disp: , Rfl:     losartan (COZAAR) 100 MG tablet, TAKE 1 TABLET BY MOUTH EVERY DAY, Disp: 90 tablet, Rfl: 3    Multiple Vitamins-Minerals (CENTRUM SILVER PO), Take by mouth daily, Disp: , Rfl:     Omega-3 Fatty Acids (OMEGA 3 PO), Take by mouth daily  , Disp: , Rfl:   Allergies   Allergen Reactions    Sulfa Antibiotics Rash     redness         Social, Family, Medication history reviewed and updated as necessary      Labs:     Lab Results   Component Value Date     09/01/2015    K 4.7 08/07/2023     08/07/2023    CO2 27 08/07/2023    BUN 11 08/07/2023    CREATININE 0.84 08/07/2023    CREATININE 0.73 12/21/2022    GLUCOSE 94 09/01/2015    CALCIUM 9.8 08/07/2023       Lab Results   Component Value Date    WBC 6.45 12/21/2022    HGB 16.2 12/21/2022    HGB 15.4 05/03/2021    HCT 48.2 12/21/2022    HCT 46.6 05/03/2021     12/21/2022     05/03/2021       Lab Results   Component Value Date    CHOL 196 09/01/2015    CHOL 224 02/27/2015     Lab Results   Component Value Date    HDL 43 08/07/2023    HDL 40 12/21/2022     Lab Results   Component Value Date LDLCALC 126 (H) 08/07/2023    LDLCALC 124 (H) 12/21/2022     No results found for: "LDLDIRECT"          Lab Results   Component Value Date    TRIG 98 08/07/2023    TRIG 112 12/21/2022       Lab Results   Component Value Date    ALT 35 08/07/2023    ALT 39 12/21/2022    AST 20 08/07/2023    AST 27 12/21/2022    ALKPHOS 98 08/07/2023    ALKPHOS 94 12/21/2022       No results found for: "INR"    No results found for: "NTBNP"    Lab Results   Component Value Date    HGBA1C 5.6 12/21/2022           Imaging: Reviewed in epic        Review of Systems:  14 systems reviewed and negative with exception of the above        PHYSICAL EXAM:      Vitals:    12/15/23 1023   BP: 116/74   Pulse: (!) 51   SpO2: 97%     Body mass index is 26.58 kg/m². Weight (last 2 days)       Date/Time Weight    12/15/23 1023 81.6 (180)            130/80 bp recheck me  Gen: No acute distress  HEENT: anicteric, mucous membranes moist  Neck: supple, no jugular venous distention, or carotid bruit  Heart: regular, normal s1 and s2,2/6 zoë  Lungs :clear to auscultation bilaterally, no rales/rhonchi or wheeze  Abdomen: soft nontender, normoactive bowel sounds, no organomegaly  Ext: warm and perfused, normal femoral pulses, no edema, or clubbing  Skin: warm, no rashes  Neuro: AAO x 3, no focal findings  Psychiatric: normal affect  Musculoskeletal: no obvious joint deformities. This note was completed in part utilizing [employer] direct voice recognition software. Grammatical errors, random word insertion, spelling mistakes, and incomplete sentences may be an occasional consequence of the system secondary to software limitations, ambient noise and hardware issues. At the time of dictation, efforts were made to edit, clarify and /or correct errors. Please read the chart carefully and recognize, using context, where substitutions have occurred.   If you have any questions or concerns about the context, text or information contained within the body of this dictation, please contact myself, the provider, for further clarification.

## 2024-01-08 ENCOUNTER — TELEMEDICINE (OUTPATIENT)
Dept: FAMILY MEDICINE CLINIC | Facility: CLINIC | Age: 74
End: 2024-01-08
Payer: MEDICARE

## 2024-01-08 VITALS
BODY MASS INDEX: 26.29 KG/M2 | TEMPERATURE: 98.4 F | SYSTOLIC BLOOD PRESSURE: 130 MMHG | DIASTOLIC BLOOD PRESSURE: 82 MMHG | WEIGHT: 178 LBS | HEART RATE: 63 BPM

## 2024-01-08 DIAGNOSIS — U07.1 COVID-19: Primary | ICD-10-CM

## 2024-01-08 PROCEDURE — 99213 OFFICE O/P EST LOW 20 MIN: CPT | Performed by: FAMILY MEDICINE

## 2024-01-08 RX ORDER — CODEINE PHOSPHATE/GUAIFENESIN 10-100MG/5
5 LIQUID (ML) ORAL
Qty: 120 ML | Refills: 0 | Status: SHIPPED | OUTPATIENT
Start: 2024-01-08

## 2024-01-08 RX ORDER — NIRMATRELVIR AND RITONAVIR 300-100 MG
3 KIT ORAL 2 TIMES DAILY
Qty: 30 TABLET | Refills: 0 | Status: SHIPPED | OUTPATIENT
Start: 2024-01-08 | End: 2024-01-13

## 2024-01-08 NOTE — PROGRESS NOTES
COVID-19 Outpatient Progress Note    Assessment/Plan:    Problem List Items Addressed This Visit    None  Visit Diagnoses       COVID-19    -  Primary    Relevant Medications    guaifenesin-codeine (GUAIFENESIN AC) 100-10 MG/5ML liquid    nirmatrelvir & ritonavir (Paxlovid, 300/100,) tablet therapy pack           Disposition:     Patient with asymptomatic/mild COVID-19: They were recommended to isolate for at least 5 days (day 0 is the day symptoms appeared or the date the specimen was collected for the positive test for people who are asymptomatic). If they are asymptomatic or symptoms are improving with no fevers in the past 24 hours, isolation may be ended followed by 5 days of wearing a high quality mask when around others to minimize risk of infecting others. They should wear a mask through day 10 and a test-based strategy may be used to remove a mask sooner.      Advised rest, plenty of fluids, Tylenol as needed for body aches or fever, Mucinex as needed for cough/ congestion. Discussed risks/ benefits of treatment options, patient understands and agrees to Paxlovid. Patient was encouraged to contact the office for persistent or worsening symptoms or go to ER. Patient understands and agrees with the treatment plan.    Patient meets criteria for Paxlovid and they have been counseled appropriately regarding risks, benefits, side effects, and alternative treatment options. After discussion, patient agrees to treatment.    Possible side effects of Paxlovid?    Possible side effects of Paxlovid are:  - Liver Problems. Notify us right away if you start to experience loss of appetite, yellowing of your skin and the whites of eyes (jaundice), dark-colored urine, pale colored stools and itchy skin, stomach area (abdominal) pain.  - Resistance to HIV Medicines. If you have untreated HIV infection, Paxlovid may lead to some HIV medicines not working as well in the future.  - Other possible side effects include: altered  sense of taste, diarrhea, high blood pressure, or muscle aches.    I have spent a total time of 15 minutes on the day of the encounter for this patient including risks and benefits of treatment options, instructions for management and patient and family education.       Encounter provider: Stephanie Knutson MD     Provider located at: [facility]  [address]  [address]  [facility] [address] [phone]     Recent Visits  No visits were found meeting these conditions.  Showing recent visits within past 7 days and meeting all other requirements  Today's Visits  Date Type Provider Dept   01/08/24 Telemedicine Stephanie Knutson MD [facility]   Showing today's visits and meeting all other requirements  Future Appointments  No visits were found meeting these conditions.  Showing future appointments within next 150 days and meeting all other requirements     This virtual check-in was done via [employer] and patient was informed that this is a secure, HIPAA-compliant platform. He agrees to proceed.    Patient agrees to participate in a virtual check in via telephone or video visit instead of presenting to the office to address urgent/immediate medical needs. Patient is aware this is a billable service. He acknowledged consent and understanding of privacy and security of the video platform. The patient has agreed to participate and understands they can discontinue the visit at any time.    After connecting through [email or web address], the patient was identified by name and date of birth. Denilson Dial was informed that this was a telemedicine visit and that the exam was being conducted confidentially over secure lines. My office door was closed. No one else was in the room. Denilsno Dial acknowledged consent and understanding of privacy and security of the telemedicine visit. I informed the patient that I have reviewed his record  "in Epic and presented the opportunity for him to ask any questions regarding the visit today. The patient agreed to participate.     Verification of patient location:  Patient is located in the following state in which I hold an active license: [place]    Subjective:   Denilson Dial is a 73 y.o. male who has been screened for COVID-19. Symptom change since last report: unchanged. Patient's symptoms include fatigue, nasal congestion, rhinorrhea, sore throat, loss of taste, cough, myalgias and headache. Patient denies fever, chills, shortness of breath, chest tightness, abdominal pain, nausea, vomiting and diarrhea.     - Date of symptom onset: 1/6/2024  - Date of positive COVID-19 test: 1/6/2024. Type of test: Home antigen.     COVID-19 vaccination status: Fully vaccinated with booster    No results found for: \"SARSCOV2\", \"[id number]\", \"SARSCORONAVI\", \"CORONAVIRUSR\", \"SARSCOVAG\", \"SARSCOVAGH\"    Review of Systems   Constitutional:  Positive for fatigue. Negative for chills and fever.   HENT:  Positive for congestion, rhinorrhea and sore throat.    Respiratory:  Positive for cough. Negative for chest tightness, shortness of breath and wheezing.    Gastrointestinal:  Negative for abdominal pain, diarrhea, nausea and vomiting.   Musculoskeletal:  Positive for myalgias.   Neurological:  Positive for headaches.     Current Outpatient Medications on File Prior to Visit   Medication Sig    aspirin 81 MG tablet Take 81 mg by mouth daily    atorvastatin (LIPITOR) 40 mg tablet TAKE 1 TABLET BY MOUTH EVERY DAY    Cholecalciferol (VITAMIN D-400 PO) Take by mouth    docusate sodium (COLACE) 50 mg capsule Take by mouth daily    losartan (COZAAR) 100 MG tablet TAKE 1 TABLET BY MOUTH EVERY DAY    Multiple Vitamins-Minerals (CENTRUM SILVER PO) Take by mouth daily    Omega-3 Fatty Acids (OMEGA 3 PO) Take by mouth daily      brimonidine tartrate 0.2 % ophthalmic solution INSTILL 1 DROP IN EACH EYE TWO TIMES A DAY    ketoconazole (NIZORAL) 2 " % cream as needed        Objective:    /82   Pulse 63   Temp 98.4 °F (36.9 °C)   Wt 80.7 kg (178 lb)   BMI 26.29 kg/m²        Physical Exam  Constitutional:       General: He is not in acute distress.  Pulmonary:      Effort: Pulmonary effort is normal.      Comments: No signs of respiratory distress, tachypnea, conversational dyspnea or audible wheezing noted  Neurological:      Mental Status: He is alert and oriented to person, place, and time.   Psychiatric:         Mood and Affect: Mood normal.         Behavior: Behavior normal.         Thought Content: Thought content normal.       Stephanie Knutson MD

## 2024-01-11 DIAGNOSIS — E78.5 HYPERLIPIDEMIA, UNSPECIFIED HYPERLIPIDEMIA TYPE: ICD-10-CM

## 2024-01-11 DIAGNOSIS — I10 ESSENTIAL HYPERTENSION: ICD-10-CM

## 2024-01-11 RX ORDER — ATORVASTATIN CALCIUM 40 MG/1
TABLET, FILM COATED ORAL
Qty: 90 TABLET | Refills: 3 | Status: SHIPPED | OUTPATIENT
Start: 2024-01-11

## 2024-01-11 RX ORDER — LOSARTAN POTASSIUM 100 MG/1
TABLET ORAL
Qty: 90 TABLET | Refills: 3 | Status: SHIPPED | OUTPATIENT
Start: 2024-01-11

## 2024-01-23 ENCOUNTER — APPOINTMENT (OUTPATIENT)
Dept: LAB | Facility: CLINIC | Age: 74
End: 2024-01-23
Payer: MEDICARE

## 2024-01-23 DIAGNOSIS — Z12.5 SCREENING FOR PROSTATE CANCER: ICD-10-CM

## 2024-01-23 DIAGNOSIS — I10 ESSENTIAL HYPERTENSION: ICD-10-CM

## 2024-01-23 DIAGNOSIS — E78.5 HYPERLIPIDEMIA, UNSPECIFIED HYPERLIPIDEMIA TYPE: ICD-10-CM

## 2024-01-23 LAB
ALBUMIN SERPL BCP-MCNC: 4.2 G/DL (ref 3.5–5)
ALP SERPL-CCNC: 85 U/L (ref 34–104)
ALT SERPL W P-5'-P-CCNC: 37 U/L (ref 7–52)
ANION GAP SERPL CALCULATED.3IONS-SCNC: 8 MMOL/L
AST SERPL W P-5'-P-CCNC: 28 U/L (ref 13–39)
BASOPHILS # BLD AUTO: 0.03 THOUSANDS/ÂΜL (ref 0–0.1)
BASOPHILS NFR BLD AUTO: 1 % (ref 0–1)
BILIRUB SERPL-MCNC: 0.72 MG/DL (ref 0.2–1)
BUN SERPL-MCNC: 11 MG/DL (ref 5–25)
CALCIUM SERPL-MCNC: 9.6 MG/DL (ref 8.4–10.2)
CHLORIDE SERPL-SCNC: 103 MMOL/L (ref 96–108)
CHOLEST SERPL-MCNC: 219 MG/DL
CO2 SERPL-SCNC: 26 MMOL/L (ref 21–32)
CREAT SERPL-MCNC: 0.76 MG/DL (ref 0.6–1.3)
EOSINOPHIL # BLD AUTO: 0.15 THOUSAND/ÂΜL (ref 0–0.61)
EOSINOPHIL NFR BLD AUTO: 3 % (ref 0–6)
ERYTHROCYTE [DISTWIDTH] IN BLOOD BY AUTOMATED COUNT: 12.5 % (ref 11.6–15.1)
GFR SERPL CREATININE-BSD FRML MDRD: 91 ML/MIN/1.73SQ M
GLUCOSE P FAST SERPL-MCNC: 91 MG/DL (ref 65–99)
HCT VFR BLD AUTO: 44.2 % (ref 36.5–49.3)
HDLC SERPL-MCNC: 42 MG/DL
HGB BLD-MCNC: 15 G/DL (ref 12–17)
IMM GRANULOCYTES # BLD AUTO: 0.02 THOUSAND/UL (ref 0–0.2)
IMM GRANULOCYTES NFR BLD AUTO: 0 % (ref 0–2)
LDLC SERPL CALC-MCNC: 149 MG/DL (ref 0–100)
LYMPHOCYTES # BLD AUTO: 1.32 THOUSANDS/ÂΜL (ref 0.6–4.47)
LYMPHOCYTES NFR BLD AUTO: 24 % (ref 14–44)
MCH RBC QN AUTO: 30.4 PG (ref 26.8–34.3)
MCHC RBC AUTO-ENTMCNC: 33.9 G/DL (ref 31.4–37.4)
MCV RBC AUTO: 90 FL (ref 82–98)
MONOCYTES # BLD AUTO: 0.62 THOUSAND/ÂΜL (ref 0.17–1.22)
MONOCYTES NFR BLD AUTO: 11 % (ref 4–12)
NEUTROPHILS # BLD AUTO: 3.31 THOUSANDS/ÂΜL (ref 1.85–7.62)
NEUTS SEG NFR BLD AUTO: 61 % (ref 43–75)
NRBC BLD AUTO-RTO: 0 /100 WBCS
PLATELET # BLD AUTO: 211 THOUSANDS/UL (ref 149–390)
PMV BLD AUTO: 11.3 FL (ref 8.9–12.7)
POTASSIUM SERPL-SCNC: 4.8 MMOL/L (ref 3.5–5.3)
PROT SERPL-MCNC: 7.2 G/DL (ref 6.4–8.4)
PSA SERPL-MCNC: 3.12 NG/ML (ref 0–4)
RBC # BLD AUTO: 4.93 MILLION/UL (ref 3.88–5.62)
SODIUM SERPL-SCNC: 137 MMOL/L (ref 135–147)
TRIGL SERPL-MCNC: 140 MG/DL
WBC # BLD AUTO: 5.45 THOUSAND/UL (ref 4.31–10.16)

## 2024-01-23 PROCEDURE — 80061 LIPID PANEL: CPT

## 2024-01-23 PROCEDURE — 80053 COMPREHEN METABOLIC PANEL: CPT

## 2024-01-23 PROCEDURE — 85025 COMPLETE CBC W/AUTO DIFF WBC: CPT

## 2024-01-23 PROCEDURE — 36415 COLL VENOUS BLD VENIPUNCTURE: CPT

## 2024-01-23 PROCEDURE — G0103 PSA SCREENING: HCPCS

## 2024-02-12 ENCOUNTER — RA CDI HCC (OUTPATIENT)
Dept: OTHER | Facility: HOSPITAL | Age: 74
End: 2024-02-12

## 2024-02-16 ENCOUNTER — OFFICE VISIT (OUTPATIENT)
Dept: FAMILY MEDICINE CLINIC | Facility: CLINIC | Age: 74
End: 2024-02-16
Payer: MEDICARE

## 2024-02-16 VITALS
SYSTOLIC BLOOD PRESSURE: 142 MMHG | TEMPERATURE: 97.6 F | HEART RATE: 64 BPM | RESPIRATION RATE: 16 BRPM | DIASTOLIC BLOOD PRESSURE: 84 MMHG | BODY MASS INDEX: 27.11 KG/M2 | WEIGHT: 183 LBS | OXYGEN SATURATION: 98 % | HEIGHT: 69 IN

## 2024-02-16 DIAGNOSIS — N40.1 BENIGN PROSTATIC HYPERPLASIA WITH LOWER URINARY TRACT SYMPTOMS, SYMPTOM DETAILS UNSPECIFIED: ICD-10-CM

## 2024-02-16 DIAGNOSIS — I10 ESSENTIAL HYPERTENSION: Primary | ICD-10-CM

## 2024-02-16 DIAGNOSIS — I77.810 DILATED AORTIC ROOT (HCC): ICD-10-CM

## 2024-02-16 DIAGNOSIS — E78.5 HYPERLIPIDEMIA, UNSPECIFIED HYPERLIPIDEMIA TYPE: ICD-10-CM

## 2024-02-16 DIAGNOSIS — I35.1 AORTIC VALVE INSUFFICIENCY, ETIOLOGY OF CARDIAC VALVE DISEASE UNSPECIFIED: ICD-10-CM

## 2024-02-16 DIAGNOSIS — R73.01 ELEVATED FASTING GLUCOSE: ICD-10-CM

## 2024-02-16 DIAGNOSIS — R05.8 POST-VIRAL COUGH SYNDROME: ICD-10-CM

## 2024-02-16 PROCEDURE — 99214 OFFICE O/P EST MOD 30 MIN: CPT | Performed by: FAMILY MEDICINE

## 2024-02-16 RX ORDER — OXYBUTYNIN CHLORIDE 10 MG/1
10 TABLET, EXTENDED RELEASE ORAL
COMMUNITY
Start: 2024-02-03

## 2024-02-16 NOTE — PROGRESS NOTES
Assessment/Plan:    Essential hypertension  - continue losartan 100 mg daily, discussed low sodium diet and regular exercise,   - Comprehensive metabolic panel; Future  - TSH, 3rd generation with Free T4 reflex; Future    Hyperlipidemia  - continue atorvastatin 40 mg daily, reviewed healthy low cholesterol diet and encouraged regular physical activity, will recheck lipids prior to next visit in 6 months   - Lipid Panel with Direct LDL reflex; Future  - Comprehensive metabolic panel; Future    Post viral cough  - advised to take Mucinex DM or Delsym as needed and use saline nasal spray, discussed chest x-ray if symptoms not better by next week    Elevated fasting glucose  - Comprehensive metabolic panel; Future    Aortic valve insufficiency/ dilated aortic root   -  following with Cardiology      BPH  - following with Urology, stable on Ditropan       Return in 6 months or sooner as needed.   The patient understands and agrees with the treatment plan.           Subjective:   Chief Complaint   Patient presents with    Hypertension    Hyperlipidemia     6 month recheck      Patient ID: Denilson Dial is a 73 y.o. male who presents today for follow up visit for hypertension, hyperlipidemia. Patient had Covid 5-6 weeks ago and treated with Paxlovid, he is feeling much better, but still having mild intermittent cough which is gradually improving, no purulent mucus production, no hemoptysis, no night time coughing, no fever or night sweats, no chest pain, no shortness of breath. Patient offers no other complaints.         The following portions of the patient's history were reviewed and updated as appropriate: allergies, current medications, past family history, past medical history, past social history, past surgical history and problem list.    Past Medical History:   Diagnosis Date    Adenomatous colon polyp     Aortic regurgitation     Aortic stenosis     Coronary artery disease Mytral valve, congenital     Diverticulosis     Heart murmur     Hemorrhoids     Last Assessed: 11/10/17    Hyperlipidemia     Hypertension     Serrated adenoma of colon     Visual disturbances     Visual impairment     Vitamin D deficiency      Past Surgical History:   Procedure Laterality Date    COLONOSCOPY      COLONOSCOPY N/A 11/28/2017    Procedure: COLONOSCOPY with polypectomy, random bx, and hemorrhoid banding;  Surgeon: Evelyn Yanez MD;  Location: [facility];  Service: General    COLONOSCOPY N/A 2/26/2018    Procedure: COLONOSCOPY;  Surgeon: Virgilio Rhodes MD;  Location: [facility];  Service: Gastroenterology    MOUTH SURGERY      CA COLONOSCOPY FLX DX W/COLLJ SPEC WHEN PFRMD N/A 11/29/2016    Procedure: COLONOSCOPY POSSIBLE BANDING;  Surgeon: Evelyn Yanez MD;  Location: [facility];  Service: General     Family History   Problem Relation Age of Onset    Asthma Mother     Heart failure Mother         Congestive    Depression Mother     COPD Mother     Alcohol abuse Father     Stroke Father         CVA    Thyroid disease Sister     Heart attack Brother         Myocardial Infarction    Depression Brother     Stroke Family         Syndrome     Social History     Socioeconomic History    Marital status: /Civil Union     Spouse name: Not on file    Number of children: Not on file    Years of education: Not on file    Highest education level: Not on file   Occupational History    Not on file   Tobacco Use    Smoking status: Never    Smokeless tobacco: Never   Vaping Use    Vaping status: Never Used   Substance and Sexual Activity    Alcohol use: Yes    Drug use: No    Sexual activity: Yes     Partners: Female   Other Topics Concern    Not on file   Social History Narrative    Caffeine use: 3 cups coffe per day    Has smoke detectors    Uses safety equipment: seatbelts     Social Determinants of Health     Financial Resource Strain: Low Risk  (8/10/2023)    Overall Financial Resource Strain (CARDIA)     Difficulty of Paying  Living Expenses: Not very hard   Food Insecurity: Not on file   Transportation Needs: No Transportation Needs (8/10/2023)    PRAPARE - Transportation     Lack of Transportation (Medical): No     Lack of Transportation (Non-Medical): No   Physical Activity: Not on file   Stress: Not on file   Social Connections: Not on file   Intimate Partner Violence: Not on file   Housing Stability: Not on file       Current Outpatient Medications:     aspirin 81 MG tablet, Take 81 mg by mouth daily, Disp: , Rfl:     atorvastatin (LIPITOR) 40 mg tablet, TAKE 1 TABLET BY MOUTH EVERY DAY, Disp: 90 tablet, Rfl: 3    brimonidine tartrate 0.2 % ophthalmic solution, INSTILL 1 DROP IN EACH EYE TWO TIMES A DAY, Disp: , Rfl:     Cholecalciferol (VITAMIN D-400 PO), Take by mouth, Disp: , Rfl:     docusate sodium (COLACE) 50 mg capsule, Take by mouth daily, Disp: , Rfl:     ketoconazole (NIZORAL) 2 % cream, as needed , Disp: , Rfl:     losartan (COZAAR) 100 MG tablet, TAKE 1 TABLET BY MOUTH EVERY DAY, Disp: 90 tablet, Rfl: 3    Multiple Vitamins-Minerals (CENTRUM SILVER PO), Take by mouth daily, Disp: , Rfl:     Omega-3 Fatty Acids (OMEGA 3 PO), Take by mouth daily  , Disp: , Rfl:     oxybutynin (DITROPAN-XL) 10 MG 24 hr tablet, Take 10 mg by mouth daily at bedtime, Disp: , Rfl:     guaifenesin-codeine (GUAIFENESIN AC) 100-10 MG/5ML liquid, Take 5 mL by mouth daily at bedtime as needed for cough (Patient not taking: Reported on 2/16/2024), Disp: 120 mL, Rfl: 0    Review of Systems   Constitutional:  Negative for appetite change, chills, fatigue, fever and unexpected weight change.   HENT:  Negative for congestion, rhinorrhea, sore throat and voice change.    Respiratory:  Positive for cough. Negative for shortness of breath and wheezing.    Cardiovascular:  Negative for chest pain, palpitations and leg swelling.   Gastrointestinal:  Negative for abdominal pain, blood in stool, constipation, diarrhea, nausea and vomiting.   Genitourinary:   "Negative for dysuria, frequency and hematuria.   Neurological:  Negative for dizziness, syncope, weakness, numbness and headaches.   Psychiatric/Behavioral:  Negative for dysphoric mood and sleep disturbance. The patient is not nervous/anxious.            Objective:    Vitals:    02/16/24 1323   BP: 142/84   Pulse: 64   Resp: 16   Temp: 97.6 °F (36.4 °C)   SpO2: 98%   Weight: 83 kg (183 lb)   Height: 5' 9\" (1.753 m)     BP Readings from Last 3 Encounters:   02/16/24 142/84   01/08/24 130/82   12/15/23 116/74        Physical Exam  Constitutional:       General: He is not in acute distress.     Appearance: He is well-developed.   Neck:      Thyroid: No thyromegaly.   Cardiovascular:      Rate and Rhythm: Normal rate and regular rhythm.      Heart sounds: Normal heart sounds.   Pulmonary:      Effort: Pulmonary effort is normal. No respiratory distress.      Breath sounds: Normal breath sounds. No wheezing, rhonchi or rales.   Abdominal:      Palpations: Abdomen is soft. There is no mass.      Tenderness: There is no abdominal tenderness.   Musculoskeletal:      Cervical back: Neck supple.      Right lower leg: No edema.      Left lower leg: No edema.   Lymphadenopathy:      Cervical: No cervical adenopathy.   Neurological:      Mental Status: He is alert and oriented to person, place, and time.   Psychiatric:         Mood and Affect: Mood normal.         Behavior: Behavior normal.         Thought Content: Thought content normal.          Lab Results   Component Value Date    CHOLESTEROL 219 (H) 01/23/2024    CHOLESTEROL 189 08/07/2023    CHOLESTEROL 186 12/21/2022     Lab Results   Component Value Date    HDL 42 01/23/2024    HDL 43 08/07/2023    HDL 40 12/21/2022     Lab Results   Component Value Date    TRIG 140 01/23/2024    TRIG 98 08/07/2023    TRIG 112 12/21/2022     Lab Results   Component Value Date    LDLCALC 149 (H) 01/23/2024     Lab Results   Component Value Date    KNX9QRWCASOU 3.111 08/07/2023     Lab " Results   Component Value Date    WBC 5.45 01/23/2024    HGB 15.0 01/23/2024    HCT 44.2 01/23/2024    MCV 90 01/23/2024     01/23/2024     Lab Results   Component Value Date     09/01/2015    SODIUM 137 01/23/2024    K 4.8 01/23/2024     01/23/2024    CO2 26 01/23/2024    ANIONGAP 9 09/01/2015    AGAP 8 01/23/2024    BUN 11 01/23/2024    CREATININE 0.76 01/23/2024    GLUC 106 01/29/2018    GLUF 91 01/23/2024    CALCIUM 9.6 01/23/2024    AST 28 01/23/2024    ALT 37 01/23/2024    ALKPHOS 85 01/23/2024    PROT 7.5 09/01/2015    TP 7.2 01/23/2024    BILITOT 0.57 09/01/2015    TBILI 0.72 01/23/2024    EGFR 91 01/23/2024     Lab Results   Component Value Date    PSA 3.12 01/23/2024    PSA 3.0 12/21/2022    PSA 2.4 11/01/2021

## 2024-08-13 ENCOUNTER — APPOINTMENT (OUTPATIENT)
Dept: LAB | Facility: CLINIC | Age: 74
End: 2024-08-13
Payer: MEDICARE

## 2024-08-13 DIAGNOSIS — R73.01 ELEVATED FASTING GLUCOSE: ICD-10-CM

## 2024-08-13 DIAGNOSIS — I10 ESSENTIAL HYPERTENSION: ICD-10-CM

## 2024-08-13 DIAGNOSIS — E78.5 HYPERLIPIDEMIA, UNSPECIFIED HYPERLIPIDEMIA TYPE: ICD-10-CM

## 2024-08-13 LAB
ALBUMIN SERPL BCG-MCNC: 4.2 G/DL (ref 3.5–5)
ALP SERPL-CCNC: 89 U/L (ref 34–104)
ALT SERPL W P-5'-P-CCNC: 22 U/L (ref 7–52)
ANION GAP SERPL CALCULATED.3IONS-SCNC: 7 MMOL/L (ref 4–13)
AST SERPL W P-5'-P-CCNC: 25 U/L (ref 13–39)
BILIRUB SERPL-MCNC: 0.77 MG/DL (ref 0.2–1)
BUN SERPL-MCNC: 13 MG/DL (ref 5–25)
CALCIUM SERPL-MCNC: 9.9 MG/DL (ref 8.4–10.2)
CHLORIDE SERPL-SCNC: 102 MMOL/L (ref 96–108)
CHOLEST SERPL-MCNC: 171 MG/DL
CO2 SERPL-SCNC: 28 MMOL/L (ref 21–32)
CREAT SERPL-MCNC: 0.77 MG/DL (ref 0.6–1.3)
GFR SERPL CREATININE-BSD FRML MDRD: 90 ML/MIN/1.73SQ M
GLUCOSE P FAST SERPL-MCNC: 103 MG/DL (ref 65–99)
HDLC SERPL-MCNC: 40 MG/DL
LDLC SERPL CALC-MCNC: 111 MG/DL (ref 0–100)
POTASSIUM SERPL-SCNC: 5.2 MMOL/L (ref 3.5–5.3)
PROT SERPL-MCNC: 7.1 G/DL (ref 6.4–8.4)
SODIUM SERPL-SCNC: 137 MMOL/L (ref 135–147)
TRIGL SERPL-MCNC: 98 MG/DL
TSH SERPL DL<=0.05 MIU/L-ACNC: 2.47 UIU/ML (ref 0.45–4.5)

## 2024-08-13 PROCEDURE — 80053 COMPREHEN METABOLIC PANEL: CPT

## 2024-08-13 PROCEDURE — 84443 ASSAY THYROID STIM HORMONE: CPT

## 2024-08-13 PROCEDURE — 36415 COLL VENOUS BLD VENIPUNCTURE: CPT

## 2024-08-13 PROCEDURE — 80061 LIPID PANEL: CPT

## 2024-08-16 PROBLEM — N40.1 BPH ASSOCIATED WITH NOCTURIA: Status: ACTIVE | Noted: 2024-08-16

## 2024-08-16 PROBLEM — R35.1 BPH ASSOCIATED WITH NOCTURIA: Status: ACTIVE | Noted: 2024-08-16

## 2024-08-16 PROBLEM — Z12.5 PROSTATE CANCER SCREENING: Status: ACTIVE | Noted: 2024-08-16

## 2024-08-16 NOTE — ASSESSMENT & PLAN NOTE
Most recent PSA from 1/23/24 was 3.12  RUSS is normal. No nodules, irregularities or areas of tenderness. Prostate is symmetrical ~ 50g in size  Denies family history of prostate cancer   Continue to monitor PSA on an annual basis -- order placed by PCP  Component  Ref Range & Units 1/23/24 0812 12/21/22 0748 11/1/21 0813 11/9/20 0753 9/17/19 0802 9/10/18 0830 9/11/17 0809   PSA  0.00 - 4.00 ng/mL 3.12 3.0 R, CM 2.4 R, CM 2.3 R, CM 2.6 R, CM 2.2 R, CM 1.6 R,

## 2024-08-16 NOTE — PROGRESS NOTES
Ambulatory Visit  Name: Denilson Dial      : 1950      MRN: [id number]  Encounter Provider: Ana Maria Dinero PA-C  Encounter Date: 2024   Encounter department: [facility] [place]    Assessment & Plan   1. Prostate cancer screening  Assessment & Plan:  Most recent PSA from 24 was 3.12  RUSS is normal. No nodules, irregularities or areas of tenderness. Prostate is symmetrical ~ 50g in size  Denies family history of prostate cancer   Continue to monitor PSA on an annual basis -- order placed by PCP  Component  Ref Range & Units 24 0812 22 0748 21 0813 20 0753 19 0802 9/10/18 0830 17 0809   PSA  0.00 - 4.00 ng/mL 3.12 3.0 R, CM 2.4 R, CM 2.3 R, CM 2.6 R, CM 2.2 R, CM 1.6 R,     Orders:  -     POCT Measure PVR  2. BPH associated with nocturia  Assessment & Plan:  AUA score 10 today in office   PVR 66 mL - no sign of urinary retention    Continue taking oxybutynin 10 mg daily as prescribed   Refill as needed   Encouraged increased hydration and avoidance of bladder irritants / constipation   Continue to monitor on an annual basis     Orders:  -     POCT Measure PVR  -     oxybutynin (DITROPAN-XL) 10 MG 24 hr tablet; Take 1 tablet (10 mg total) by mouth daily at bedtime      History of Present Illness     Denilson Dial is a 73 y.o. male who presents to the office for annual follow-up to discuss BPH and annual prostate cancer screening.  Patient's AUA score was 10 today in office.  He continues to get up roughly 2-3 times nightly.  He continues taking oxybutynin 10 mg daily as prescribed.  He is very content with the medication results.  Refill sent to patient's pharmacy.  PVR 66 mL today in office, no signs of urinary retention.  Patient denies urinary frequency/urgency, dysuria, hematuria, incomplete emptying, or pain in bladder/bilateral flanks.  Educated to increase hydration overall, avoid bladder irritants as well as constipation.  Patient has  tried to limit his water intake before bed, educated that 90 minutes prior is recommended.  We will continue to monitor progression of his urinary symptoms on an annual basis.    Patient's most recent PSA from 1/23/2024 was 3.12.  Patient denies history of elevated PSA or family history of prostate cancer.  RUSS today in office is smooth, nontender, mildly enlarged approximately 50 g.  Continue to repeat PSA and RUSS on an annual basis.  PSA test ordered per PCP.  Patient will follow-up in 1 year for recheck.    AUA SYMPTOM SCORE      Flowsheet Row Most Recent Value   AUA SYMPTOM SCORE    How often have you had a sensation of not emptying your bladder completely after you finished urinating? 0 (P)     How often have you had to urinate again less than two hours after you finished urinating? 1 (P)     How often have you found you stopped and started again several times when you urinate? 0 (P)     How often have you found it difficult to postpone urination? 4 (P)     How often have you had a weak urinary stream? 0 (P)     How often have you had to push or strain to begin urination? 0 (P)     How many times did you most typically get up to urinate from the time you went to bed at night until the time you got up in the morning? 5 (P)     Quality of Life: If you were to spend the rest of your life with your urinary condition just the way it is now, how would you feel about that? 2 (P)     AUA SYMPTOM SCORE 10 (P)            Review of Systems   Constitutional:  Negative for activity change, chills, fatigue and fever.   Respiratory:  Negative for apnea, cough and shortness of breath.    Cardiovascular:  Negative for chest pain and leg swelling.   Gastrointestinal:  Negative for abdominal distention, abdominal pain, constipation, diarrhea, nausea and vomiting.   Genitourinary:  Negative for decreased urine volume, difficulty urinating, dysuria, flank pain, frequency, hematuria, penile pain, testicular pain and urgency.  "  Neurological:  Negative for dizziness and headaches.   Psychiatric/Behavioral: Negative.       Medical History Reviewed by provider this encounter:  Tobacco  Allergies  Meds  Problems  Med Hx  Surg Hx  Fam Hx       Current Outpatient Medications on File Prior to Visit   Medication Sig Dispense Refill    aspirin 81 MG tablet Take 81 mg by mouth daily      atorvastatin (LIPITOR) 40 mg tablet TAKE 1 TABLET BY MOUTH EVERY DAY 90 tablet 3    brimonidine tartrate 0.2 % ophthalmic solution INSTILL 1 DROP IN EACH EYE TWO TIMES A DAY      Cholecalciferol (VITAMIN D-400 PO) Take by mouth      docusate sodium (COLACE) 50 mg capsule Take by mouth daily      ketoconazole (NIZORAL) 2 % cream as needed       losartan (COZAAR) 100 MG tablet TAKE 1 TABLET BY MOUTH EVERY DAY 90 tablet 3    Multiple Vitamins-Minerals (CENTRUM SILVER PO) Take by mouth daily      Omega-3 Fatty Acids (OMEGA 3 PO) Take by mouth daily        [DISCONTINUED] oxybutynin (DITROPAN-XL) 10 MG 24 hr tablet Take 10 mg by mouth daily at bedtime       No current facility-administered medications on file prior to visit.      Social History     Tobacco Use    Smoking status: Never    Smokeless tobacco: Never   Vaping Use    Vaping status: Never Used   Substance and Sexual Activity    Alcohol use: Yes    Drug use: No    Sexual activity: Yes     Partners: Female       Objective     /84 (BP Location: Left arm, Patient Position: Sitting, Cuff Size: Adult)   Pulse 57   Ht 5' 9\" (1.753 m)   Wt 79.6 kg (175 lb 6.4 oz)   SpO2 99%   BMI 25.90 kg/m²   Physical Exam  Vitals and nursing note reviewed.   Constitutional:       General: He is not in acute distress.     Appearance: Normal appearance. He is well-developed.   HENT:      Head: Normocephalic and atraumatic.   Eyes:      Conjunctiva/sclera: Conjunctivae normal.   Cardiovascular:      Rate and Rhythm: Normal rate and regular rhythm.      Heart sounds: No murmur heard.  Pulmonary:      Effort: Pulmonary " effort is normal. No respiratory distress.      Breath sounds: Normal breath sounds.   Abdominal:      Palpations: Abdomen is soft.      Tenderness: There is no abdominal tenderness.   Musculoskeletal:         General: No swelling.      Cervical back: Neck supple.   Skin:     General: Skin is warm and dry.      Capillary Refill: Capillary refill takes less than 2 seconds.   Neurological:      Mental Status: He is alert.   Psychiatric:         Mood and Affect: Mood normal.       Results  Lab Results   Component Value Date    PSA 3.12 01/23/2024    PSA 3.0 12/21/2022    PSA 2.4 11/01/2021     Lab Results   Component Value Date    GLUCOSE 94 09/01/2015    CALCIUM 9.9 08/13/2024     09/01/2015    K 5.2 08/13/2024    CO2 28 08/13/2024     08/13/2024    BUN 13 08/13/2024    CREATININE 0.77 08/13/2024     Lab Results   Component Value Date    WBC 5.45 01/23/2024    HGB 15.0 01/23/2024    HCT 44.2 01/23/2024    MCV 90 01/23/2024     01/23/2024       Office Urine Dip  Recent Results (from the past 1 hour(s))   POCT Measure PVR    Collection Time: 08/26/24  3:30 PM   Result Value Ref Range    POST-VOID RESIDUAL VOLUME, ML POC 66 mL   ]

## 2024-08-16 NOTE — ASSESSMENT & PLAN NOTE
AUA score 10 today in office   PVR 66 mL - no sign of urinary retention    Continue taking oxybutynin 10 mg daily as prescribed   Refill as needed   Encouraged increased hydration and avoidance of bladder irritants / constipation   Continue to monitor on an annual basis

## 2024-08-23 ENCOUNTER — OFFICE VISIT (OUTPATIENT)
Dept: FAMILY MEDICINE CLINIC | Facility: CLINIC | Age: 74
End: 2024-08-23
Payer: MEDICARE

## 2024-08-23 VITALS
RESPIRATION RATE: 15 BRPM | HEART RATE: 51 BPM | HEIGHT: 69 IN | WEIGHT: 175.6 LBS | DIASTOLIC BLOOD PRESSURE: 84 MMHG | TEMPERATURE: 97.7 F | BODY MASS INDEX: 26.01 KG/M2 | OXYGEN SATURATION: 97 % | SYSTOLIC BLOOD PRESSURE: 138 MMHG

## 2024-08-23 DIAGNOSIS — I10 ESSENTIAL HYPERTENSION: ICD-10-CM

## 2024-08-23 DIAGNOSIS — Z12.5 SCREENING FOR PROSTATE CANCER: ICD-10-CM

## 2024-08-23 DIAGNOSIS — I35.1 AORTIC VALVE INSUFFICIENCY, ETIOLOGY OF CARDIAC VALVE DISEASE UNSPECIFIED: ICD-10-CM

## 2024-08-23 DIAGNOSIS — R73.01 ELEVATED FASTING GLUCOSE: ICD-10-CM

## 2024-08-23 DIAGNOSIS — Z00.00 MEDICARE ANNUAL WELLNESS VISIT, SUBSEQUENT: Primary | ICD-10-CM

## 2024-08-23 DIAGNOSIS — I77.810 DILATED AORTIC ROOT (HCC): ICD-10-CM

## 2024-08-23 DIAGNOSIS — N40.1 BENIGN PROSTATIC HYPERPLASIA WITH LOWER URINARY TRACT SYMPTOMS, SYMPTOM DETAILS UNSPECIFIED: ICD-10-CM

## 2024-08-23 DIAGNOSIS — E78.5 HYPERLIPIDEMIA, UNSPECIFIED HYPERLIPIDEMIA TYPE: ICD-10-CM

## 2024-08-23 PROCEDURE — 99214 OFFICE O/P EST MOD 30 MIN: CPT | Performed by: FAMILY MEDICINE

## 2024-08-23 PROCEDURE — G0439 PPPS, SUBSEQ VISIT: HCPCS | Performed by: FAMILY MEDICINE

## 2024-08-23 NOTE — PROGRESS NOTES
Ambulatory Visit  Name: Denilson Dial      : 1950      MRN: [id number]  Encounter Provider: Stephanie Knutson MD  Encounter Date: 2024   Encounter department: [facility]    Assessment & Plan     Medicare annual wellness visit, subsequent    Essential hypertension   - well controlled on losartan, will recheck lab work prior to next visit       - Comprehensive metabolic panel       - CBC and differential    Hyperlipidemia  - LDL 11, HDL 40, improved, continue atorvastatin and life style changes, will recheck lipids and LFT's prior to next visit       - Lipid Panel with Direct LDL reflex       - Comprehensive metabolic panel    Elevated fasting glucose  - A1c 5.6, stable, reviewed dietary modifications       - Comprehensive metabolic panel    Benign prostatic hyperplasia  - following with Urology, doing well on Ditropan     Aortic valve insufficiency/ dilated aortic root   -  following with Cardiology         Return in 6 months or sooner as needed. Preventive health issues were discussed with patient, and age appropriate screening tests were ordered as noted in patient's After Visit Summary. Personalized health advice and appropriate referrals for health education or preventive services given if needed, as noted in patient's After Visit Summary.    History of Present Illness     Patient presents today for AWV and follow up for hypertension, hyperlipidemia. Patient states he is doing well and has no complaints at this time.     Hypertension  Pertinent negatives include no chest pain, headaches, palpitations or shortness of breath.   Hyperlipidemia  Pertinent negatives include no chest pain or shortness of breath.      Patient Care Team:  Stephanie Knutson MD as PCP - General  MD German Lawrence DO Ronak Modi, MD Ronak Modi, MD as Endoscopist    Review of Systems   Constitutional:  Negative for appetite change, chills, fatigue, fever and  unexpected weight change.   HENT:  Negative for congestion, ear pain, sore throat and voice change.    Respiratory:  Negative for cough, shortness of breath and wheezing.    Cardiovascular:  Negative for chest pain, palpitations and leg swelling.   Gastrointestinal:  Negative for abdominal pain, blood in stool, constipation, diarrhea, nausea and vomiting.   Genitourinary:  Negative for dysuria, frequency and hematuria.   Neurological:  Negative for dizziness, syncope, weakness, numbness and headaches.   Psychiatric/Behavioral:  Negative for dysphoric mood and sleep disturbance. The patient is not nervous/anxious.      Medical History Reviewed by provider this encounter:       Annual Wellness Visit Questionnaire   Denilson is here for his Subsequent Wellness visit.     Health Risk Assessment:   Patient rates overall health as excellent. Patient feels that their physical health rating is same. Patient is very satisfied with their life. Eyesight was rated as same. Hearing was rated as same. Patient feels that their emotional and mental health rating is same. Patients states they are never, rarely angry. Patient states they are never, rarely unusually tired/fatigued. Pain experienced in the last 7 days has been none. Patient states that he has experienced no weight loss or gain in last 6 months.     Depression Screening:   PHQ-2 Score: 0      Fall Risk Screening:   In the past year, patient has experienced: no history of falling in past year      Home Safety:  Patient does not have trouble with stairs inside or outside of their home. Patient has working smoke alarms and has working carbon monoxide detector. Home safety hazards include: none.     Nutrition:   Current diet is Regular.     Medications:   Patient is currently taking over-the-counter supplements. OTC medications include: see medication list. Patient is able to manage medications.     Activities of Daily Living (ADLs)/Instrumental Activities of Daily Living  (IADLs):   Walk and transfer into and out of bed and chair?: Yes  Dress and groom yourself?: Yes    Bathe or shower yourself?: Yes    Feed yourself? Yes  Do your laundry/housekeeping?: Yes  Manage your money, pay your bills and track your expenses?: Yes  Make your own meals?: Yes    Do your own shopping?: Yes    Previous Hospitalizations:   Any hospitalizations or ED visits within the last 12 months?: No      Advance Care Planning:   Living will: Yes    Durable POA for healthcare: Yes    Advanced directive: Yes      Cognitive Screening:   Provider or family/friend/caregiver concerned regarding cognition?: No    PREVENTIVE SCREENINGS      Cardiovascular Screening:    General: Screening Not Indicated and History Lipid Disorder      Diabetes Screening:     General: Screening Current      Colorectal Cancer Screening:     General: Screening Current      Prostate Cancer Screening:    General: Screening Current      Osteoporosis Screening:    General: Patient Declines      Abdominal Aortic Aneurysm (AAA) Screening:    Risk factors include: age between 65-74 yo        Lung Cancer Screening:     General: Screening Not Indicated      Hepatitis C Screening:    General: Screening Current    Screening, Brief Intervention, and Referral to Treatment (SBIRT)    Screening  Typical number of drinks in a day: 0  Typical number of drinks in a week: 0  Interpretation: Low risk drinking behavior.    AUDIT-C Screenin) How often did you have a drink containing alcohol in the past year? never  2) How many drinks did you have on a typical day when you were drinking in the past year? 0  3) How often did you have 6 or more drinks on one occasion in the past year? never    AUDIT-C Score: 0  Interpretation: Score 0-3 (male): Negative screen for alcohol misuse    Single Item Drug Screening:  How often have you used an illegal drug (including marijuana) or a prescription medication for non-medical reasons in the past year? never    Single  "Item Drug Screen Score: 0  Interpretation: Negative screen for possible drug use disorder    Social Determinants of Health     Financial Resource Strain: Low Risk  (8/10/2023)    Overall Financial Resource Strain (CARDIA)     Difficulty of Paying Living Expenses: Not very hard   Food Insecurity: No Food Insecurity (8/16/2024)    Hunger Vital Sign     Worried About Running Out of Food in the Last Year: Never true     Ran Out of Food in the Last Year: Never true   Transportation Needs: No Transportation Needs (8/16/2024)    PRAPARE - Transportation     Lack of Transportation (Medical): No     Lack of Transportation (Non-Medical): No   Housing Stability: Low Risk  (8/16/2024)    Housing Stability Vital Sign     Unable to Pay for Housing in the Last Year: No     Number of Times Moved in the Last Year: 0     Homeless in the Last Year: No   Utilities: Not At Risk (8/16/2024)    [facility] Utilities     Threatened with loss of utilities: No     No results found.    Objective     /84   Pulse (!) 51   Temp 97.7 °F (36.5 °C)   Resp 15   Ht 5' 9\" (1.753 m)   Wt 79.7 kg (175 lb 9.6 oz)   SpO2 97%   BMI 25.93 kg/m²     Physical Exam  Constitutional:       General: He is not in acute distress.  HENT:      Right Ear: Tympanic membrane, ear canal and external ear normal.      Left Ear: Tympanic membrane, ear canal and external ear normal.      Mouth/Throat:      Mouth: Mucous membranes are moist.      Pharynx: Oropharynx is clear.   Eyes:      Extraocular Movements: Extraocular movements intact.      Conjunctiva/sclera: Conjunctivae normal.      Pupils: Pupils are equal, round, and reactive to light.   Cardiovascular:      Rate and Rhythm: Normal rate and regular rhythm.      Heart sounds: Normal heart sounds.   Pulmonary:      Effort: Pulmonary effort is normal.      Breath sounds: Normal breath sounds. No wheezing, rhonchi or rales.   Abdominal:      Palpations: Abdomen is soft. There is no mass.      Tenderness: There is " no abdominal tenderness.   Musculoskeletal:      Right lower leg: No edema.      Left lower leg: No edema.   Neurological:      Mental Status: He is alert and oriented to person, place, and time.   Psychiatric:         Mood and Affect: Mood normal.         Behavior: Behavior normal.         Thought Content: Thought content normal.       Lab Results   Component Value Date    CHOLESTEROL 171 08/13/2024    CHOLESTEROL 219 (H) 01/23/2024    CHOLESTEROL 189 08/07/2023     Lab Results   Component Value Date    HDL 40 08/13/2024    HDL 42 01/23/2024    HDL 43 08/07/2023     Lab Results   Component Value Date    TRIG 98 08/13/2024    TRIG 140 01/23/2024    TRIG 98 08/07/2023     Lab Results   Component Value Date    LDLCALC 111 (H) 08/13/2024     Lab Results   Component Value Date    HGBA1C 5.6 12/21/2022     Lab Results   Component Value Date     09/01/2015    SODIUM 137 08/13/2024    K 5.2 08/13/2024     08/13/2024    CO2 28 08/13/2024    ANIONGAP 9 09/01/2015    AGAP 7 08/13/2024    BUN 13 08/13/2024    CREATININE 0.77 08/13/2024    GLUC 106 01/29/2018    GLUF 103 (H) 08/13/2024    CALCIUM 9.9 08/13/2024    AST 25 08/13/2024    ALT 22 08/13/2024    ALKPHOS 89 08/13/2024    PROT 7.5 09/01/2015    TP 7.1 08/13/2024    BILITOT 0.57 09/01/2015    TBILI 0.77 08/13/2024    EGFR 90 08/13/2024

## 2024-08-23 NOTE — PATIENT INSTRUCTIONS
Medicare Preventive Visit Patient Instructions  Thank you for completing your Welcome to Medicare Visit or Medicare Annual Wellness Visit today. Your next wellness visit will be due in one year (8/24/2025).  The screening/preventive services that you may require over the next 5-10 years are detailed below. Some tests may not apply to you based off risk factors and/or age. Screening tests ordered at today's visit but not completed yet may show as past due. Also, please note that scanned in results may not display below.  Preventive Screenings:  Service Recommendations Previous Testing/Comments   Colorectal Cancer Screening  Colonoscopy    Fecal Occult Blood Test (FOBT)/Fecal Immunochemical Test (FIT)  Fecal DNA/Cologuard Test  Flexible Sigmoidoscopy Age: 45-75 years old   Colonoscopy: every 10 years (May be performed more frequently if at higher risk)  OR  FOBT/FIT: every 1 year  OR  Cologuard: every 3 years  OR  Sigmoidoscopy: every 5 years  Screening may be recommended earlier than age 45 if at higher risk for colorectal cancer. Also, an individualized decision between you and your healthcare provider will decide whether screening between the ages of 76-85 would be appropriate. Colonoscopy: 09/01/2021  FOBT/FIT: Not on file  Cologuard: Not on file  Sigmoidoscopy: Not on file    Screening Current     Prostate Cancer Screening Individualized decision between patient and health care provider in men between ages of 55-69   Medicare will cover every 12 months beginning on the day after your 50th birthday PSA: 3.12 ng/mL     Screening Current     Hepatitis C Screening Once for adults born between 1945 and 1965  More frequently in patients at high risk for Hepatitis C Hep C Antibody: 03/12/2018    Screening Current   Diabetes Screening 1-2 times per year if you're at risk for diabetes or have pre-diabetes Fasting glucose: 103 mg/dL (8/13/2024)  A1C: 5.6 % (12/21/2022)  Screening Current   Cholesterol Screening Once every  5 years if you don't have a lipid disorder. May order more often based on risk factors. Lipid panel: 08/13/2024  Screening Not Indicated  History Lipid Disorder      Other Preventive Screenings Covered by Medicare:  Abdominal Aortic Aneurysm (AAA) Screening: covered once if your at risk. You're considered to be at risk if you have a family history of AAA or a male between the age of 65-75 who smoking at least 100 cigarettes in your lifetime.  Lung Cancer Screening: covers low dose CT scan once per year if you meet all of the following conditions: (1) Age 55-77; (2) No signs or symptoms of lung cancer; (3) Current smoker or have quit smoking within the last 15 years; (4) You have a tobacco smoking history of at least 20 pack years (packs per day x number of years you smoked); (5) You get a written order from a healthcare provider.  Glaucoma Screening: covered annually if you're considered high risk: (1) You have diabetes OR (2) Family history of glaucoma OR (3)  aged 50 and older OR (4)  American aged 65 and older  Osteoporosis Screening: covered every 2 years if you meet one of the following conditions: (1) Have a vertebral abnormality; (2) On glucocorticoid therapy for more than 3 months; (3) Have primary hyperparathyroidism; (4) On osteoporosis medications and need to assess response to drug therapy.  HIV Screening: covered annually if you're between the age of 15-65. Also covered annually if you are younger than 15 and older than 65 with risk factors for HIV infection. For pregnant patients, it is covered up to 3 times per pregnancy.    Immunizations:  Immunization Recommendations   Influenza Vaccine Annual influenza vaccination during flu season is recommended for all persons aged >= 6 months who do not have contraindications   Pneumococcal Vaccine   * Pneumococcal conjugate vaccine = PCV13 (Prevnar 13), PCV15 (Vaxneuvance), PCV20 (Prevnar 20)  * Pneumococcal polysaccharide vaccine =  PPSV23 (Pneumovax) Adults 19-63 yo with certain risk factors or if 65+ yo  If never received any pneumonia vaccine: recommend Prevnar 20 (PCV20)  Give PCV20 if previously received 1 dose of PCV13 or PPSV23   Hepatitis B Vaccine 3 dose series if at intermediate or high risk (ex: diabetes, end stage renal disease, liver disease)   Respiratory syncytial virus (RSV) Vaccine - COVERED BY MEDICARE PART D  * RSVPreF3 (Arexvy) CDC recommends that adults 60 years of age and older may receive a single dose of RSV vaccine using shared clinical decision-making (SCDM)   Tetanus (Td) Vaccine - COST NOT COVERED BY MEDICARE PART B Following completion of primary series, a booster dose should be given every 10 years to maintain immunity against tetanus. Td may also be given as tetanus wound prophylaxis.   Tdap Vaccine - COST NOT COVERED BY MEDICARE PART B Recommended at least once for all adults. For pregnant patients, recommended with each pregnancy.   Shingles Vaccine (Shingrix) - COST NOT COVERED BY MEDICARE PART B  2 shot series recommended in those 19 years and older who have or will have weakened immune systems or those 50 years and older     Health Maintenance Due:      Topic Date Due   • Colorectal Cancer Screening  08/31/2026   • Hepatitis C Screening  Completed     Immunizations Due:      Topic Date Due   • Influenza Vaccine (1) 09/01/2024     Advance Directives   What are advance directives?  Advance directives are legal documents that state your wishes and plans for medical care. These plans are made ahead of time in case you lose your ability to make decisions for yourself. Advance directives can apply to any medical decision, such as the treatments you want, and if you want to donate organs.   What are the types of advance directives?  There are many types of advance directives, and each state has rules about how to use them. You may choose a combination of any of the following:  Living will:  This is a written record  of the treatment you want. You can also choose which treatments you do not want, which to limit, and which to stop at a certain time. This includes surgery, medicine, IV fluid, and tube feedings.   Durable power of  for healthcare (DPAHC):  This is a written record that states who you want to make healthcare choices for you when you are unable to make them for yourself. This person, called a proxy, is usually a family member or a friend. You may choose more than 1 proxy.  Do not resuscitate (DNR) order:  A DNR order is used in case your heart stops beating or you stop breathing. It is a request not to have certain forms of treatment, such as CPR. A DNR order may be included in other types of advance directives.  Medical directive:  This covers the care that you want if you are in a coma, near death, or unable to make decisions for yourself. You can list the treatments you want for each condition. Treatment may include pain medicine, surgery, blood transfusions, dialysis, IV or tube feedings, and a ventilator (breathing machine).  Values history:  This document has questions about your views, beliefs, and how you feel and think about life. This information can help others choose the care that you would choose.  Why are advance directives important?  An advance directive helps you control your care. Although spoken wishes may be used, it is better to have your wishes written down. Spoken wishes can be misunderstood, or not followed. Treatments may be given even if you do not want them. An advance directive may make it easier for your family to make difficult choices about your care.   Weight Management   Why it is important to manage your weight:  Being overweight increases your risk of health conditions such as heart disease, high blood pressure, type 2 diabetes, and certain types of cancer. It can also increase your risk for osteoarthritis, sleep apnea, and other respiratory problems. Aim for a slow, steady  weight loss. Even a small amount of weight loss can lower your risk of health problems.  How to lose weight safely:  A safe and healthy way to lose weight is to eat fewer calories and get regular exercise. You can lose up about 1 pound a week by decreasing the number of calories you eat by 500 calories each day.   Healthy meal plan for weight management:  A healthy meal plan includes a variety of foods, contains fewer calories, and helps you stay healthy. A healthy meal plan includes the following:  Eat whole-grain foods more often.  A healthy meal plan should contain fiber. Fiber is the part of grains, fruits, and vegetables that is not broken down by your body. Whole-grain foods are healthy and provide extra fiber in your diet. Some examples of whole-grain foods are whole-wheat breads and pastas, oatmeal, brown rice, and bulgur.  Eat a variety of vegetables every day.  Include dark, leafy greens such as spinach, kale, teo greens, and mustard greens. Eat yellow and orange vegetables such as carrots, sweet potatoes, and winter squash.   Eat a variety of fruits every day.  Choose fresh or canned fruit (canned in its own juice or light syrup) instead of juice. Fruit juice has very little or no fiber.  Eat low-fat dairy foods.  Drink fat-free (skim) milk or 1% milk. Eat fat-free yogurt and low-fat cottage cheese. Try low-fat cheeses such as mozzarella and other reduced-fat cheeses.  Choose meat and other protein foods that are low in fat.  Choose beans or other legumes such as split peas or lentils. Choose fish, skinless poultry (chicken or turkey), or lean cuts of red meat (beef or pork). Before you cook meat or poultry, cut off any visible fat.   Use less fat and oil.  Try baking foods instead of frying them. Add less fat, such as margarine, sour cream, regular salad dressing and mayonnaise to foods. Eat fewer high-fat foods. Some examples of high-fat foods include french fries, doughnuts, ice cream, and  cakes.  Eat fewer sweets.  Limit foods and drinks that are high in sugar. This includes candy, cookies, regular soda, and sweetened drinks.  Exercise:  Exercise at least 30 minutes per day on most days of the week. Some examples of exercise include walking, biking, dancing, and swimming. You can also fit in more physical activity by taking the stairs instead of the elevator or parking farther away from stores. Ask your healthcare provider about the best exercise plan for you.      © Copyright [employer] 2018 Information is for End User's use only and may not be sold, redistributed or otherwise used for commercial purposes. All illustrations and images included in CareNotes® are the copyrighted property of [employer] or [employer]

## 2024-08-26 ENCOUNTER — OFFICE VISIT (OUTPATIENT)
Dept: UROLOGY | Facility: HOSPITAL | Age: 74
End: 2024-08-26
Payer: MEDICARE

## 2024-08-26 VITALS
BODY MASS INDEX: 25.98 KG/M2 | WEIGHT: 175.4 LBS | SYSTOLIC BLOOD PRESSURE: 136 MMHG | OXYGEN SATURATION: 99 % | DIASTOLIC BLOOD PRESSURE: 84 MMHG | HEIGHT: 69 IN | HEART RATE: 57 BPM

## 2024-08-26 DIAGNOSIS — R35.1 BPH ASSOCIATED WITH NOCTURIA: ICD-10-CM

## 2024-08-26 DIAGNOSIS — N40.1 BPH ASSOCIATED WITH NOCTURIA: ICD-10-CM

## 2024-08-26 DIAGNOSIS — Z12.5 PROSTATE CANCER SCREENING: Primary | ICD-10-CM

## 2024-08-26 LAB — POST-VOID RESIDUAL VOLUME, ML POC: 66 ML

## 2024-08-26 PROCEDURE — 51798 US URINE CAPACITY MEASURE: CPT

## 2024-08-26 PROCEDURE — 99214 OFFICE O/P EST MOD 30 MIN: CPT

## 2024-08-26 RX ORDER — OXYBUTYNIN CHLORIDE 10 MG/1
10 TABLET, EXTENDED RELEASE ORAL
Qty: 90 TABLET | Refills: 3 | Status: SHIPPED | OUTPATIENT
Start: 2024-08-26

## 2024-09-09 ENCOUNTER — TELEPHONE (OUTPATIENT)
Dept: CARDIOLOGY CLINIC | Facility: CLINIC | Age: 74
End: 2024-09-09

## 2024-09-09 ENCOUNTER — HOSPITAL ENCOUNTER (OUTPATIENT)
Dept: NON INVASIVE DIAGNOSTICS | Facility: CLINIC | Age: 74
Discharge: HOME | End: 2024-09-09
Payer: MEDICARE

## 2024-09-09 VITALS
DIASTOLIC BLOOD PRESSURE: 84 MMHG | WEIGHT: 175 LBS | HEIGHT: 69 IN | BODY MASS INDEX: 25.92 KG/M2 | SYSTOLIC BLOOD PRESSURE: 136 MMHG | HEART RATE: 60 BPM

## 2024-09-09 DIAGNOSIS — I35.1 AORTIC VALVE INSUFFICIENCY, ETIOLOGY OF CARDIAC VALVE DISEASE UNSPECIFIED: ICD-10-CM

## 2024-09-09 LAB
AORTIC VALVE MEAN VELOCITY: 10.4 M/S
ASCENDING AORTA: 3.7 CM
AV AREA BY CONTINUOUS VTI: 2.2 CM2
AV AREA PEAK VELOCITY: 1.9 CM2
AV LVOT MEAN GRADIENT: 2 MMHG
AV LVOT PEAK GRADIENT: 3 MMHG
AV MEAN GRADIENT: 5 MMHG
AV PEAK GRADIENT: 10 MMHG
AV VALVE AREA: 2.2 CM2
AV VELOCITY RATIO: 0.51
BSA FOR ECHO PROCEDURE: 1.95 M2
DOP CALC AO PEAK VEL: 1.6 M/S
DOP CALC AO VTI: 35.79 CM
DOP CALC LVOT AREA: 3.8 CM2
DOP CALC LVOT CARDIAC INDEX: 2.4 L/MIN/M2
DOP CALC LVOT CARDIAC OUTPUT: 4.68 L/MIN
DOP CALC LVOT DIAMETER: 2.2 CM
DOP CALC LVOT PEAK VEL VTI: 20.71 CM
DOP CALC LVOT PEAK VEL: 0.82 M/S
DOP CALC LVOT STROKE INDEX: 40.5 ML/M2
DOP CALC LVOT STROKE VOLUME: 78.69
E WAVE DECELERATION TIME: 256 MS
E/A RATIO: 0.87
FRACTIONAL SHORTENING: 31 (ref 28–44)
INTERVENTRICULAR SEPTUM IN DIASTOLE (PARASTERNAL SHORT AXIS VIEW): 1.3 CM
INTERVENTRICULAR SEPTUM: 1.3 CM (ref 0.6–1.1)
LAAS-AP2: 15.8 CM2
LAAS-AP4: 12.6 CM2
LEFT ATRIUM SIZE: 4 CM
LEFT ATRIUM VOLUME (MOD BIPLANE): 41 ML
LEFT ATRIUM VOLUME INDEX (MOD BIPLANE): 21 ML/M2
LEFT INTERNAL DIMENSION IN SYSTOLE: 2.7 CM (ref 2.1–4)
LEFT VENTRICULAR INTERNAL DIMENSION IN DIASTOLE: 3.9 CM (ref 3.5–6)
LEFT VENTRICULAR POSTERIOR WALL IN END DIASTOLE: 1.2 CM
LEFT VENTRICULAR STROKE VOLUME: 40 ML
LVSV (TEICH): 40 ML
MV E'TISSUE VEL-LAT: 11 CM/S
MV E'TISSUE VEL-SEP: 10 CM/S
MV PEAK A VEL: 0.7 M/S
MV PEAK E VEL: 61 CM/S
MV STENOSIS PRESSURE HALF TIME: 74 MS
MV VALVE AREA P 1/2 METHOD: 2.97
RIGHT ATRIUM AREA SYSTOLE A4C: 8.3 CM2
RIGHT VENTRICLE ID DIMENSION: 3.1 CM
SINOTUBULAR JUNCTION: 3 CM
SINUS: 3.8 CM
SL CV LEFT ATRIUM LENGTH A2C: 4.9 CM
SL CV LV EF: 60
SL CV PED ECHO LEFT VENTRICLE DIASTOLIC VOLUME (MOD BIPLANE) 2D: 66 ML
SL CV PED ECHO LEFT VENTRICLE SYSTOLIC VOLUME (MOD BIPLANE) 2D: 27 ML
SL CV SINUS OF VALSALVA 2D: 3.7 CM
STJ: 3 CM
TR MAX PG: 17 MMHG
TR PEAK VELOCITY: 2.1 M/S
TRICUSPID ANNULAR PLANE SYSTOLIC EXCURSION: 2.5 CM
TRICUSPID VALVE PEAK REGURGITATION VELOCITY: 2.08 M/S

## 2024-09-09 PROCEDURE — 93306 TTE W/DOPPLER COMPLETE: CPT

## 2024-09-09 PROCEDURE — 93306 TTE W/DOPPLER COMPLETE: CPT | Performed by: INTERNAL MEDICINE

## 2024-09-09 NOTE — TELEPHONE ENCOUNTER
----- Message from German Owen, DO sent at 9/9/2024 12:45 PM EDT -----  Please let patient know echo looked fine. No major changes    Called pt and lft msg re: No changes in Echo .

## 2024-09-15 PROBLEM — Z12.5 PROSTATE CANCER SCREENING: Status: RESOLVED | Noted: 2024-08-16 | Resolved: 2024-09-15

## 2024-11-13 NOTE — PROGRESS NOTES
Assessment/Plan:    1  Essential hypertension  - continue Losartan 100 mg daily  - discussed low sodium diet and regular exercise  - discussed regular home BP monitoring and bring BP log to the next visit  - Comprehensive metabolic panel; Future  - Urinalysis with reflex to microscopic; Future    2  Hyperlipidemia, unspecified hyperlipidemia type  - , , continue Atorvastatin 40 mg daily, discussed low fat/ low cholesterol diet and will recheck lipid panel in 6 months  - atorvastatin (LIPITOR) 40 mg tablet; Take 1 tablet (40 mg total) by mouth daily  Dispense: 90 tablet; Refill: 3  - Lipid Panel with Direct LDL reflex; Future  - Comprehensive metabolic panel; Future    3  Subclinical hypothyroidism  - TSH 3 7 with normal T4, s/s of hypothyroidism discussed, will recheck TFT's in 6 months  - TSH, 3rd generation with Free T4 reflex; Future    4  BMI 27 0-27 9,adult  - discussed life style changes    5  Nonrheumatic aortic valve insufficiency  - Echo scheduled 10/28/19  - follow up with Cardiology    6  Need for prophylactic vaccination and inoculation against influenza  - influenza vaccine, 9525-7770, high-dose, PF 0 5 mL (FLUZONE HIGH-DOSE)    Follow up in 6 months with labs done prior  The treatment plan was reviewed with the patient  The patient understands and agrees with the treatment plan        Subjective:   Chief Complaint   Patient presents with    Hypertension    Hyperlipidemia    Hypothyroidism      Patient ID: Vipul Marcus is a 80 y o  male here for follow up visit for hypertension, hyperlipidemia and hypothyroidism  Patient states he feels well and has no complaints today  He has been compliant with his medications, has not been checking his BP at home in the past few months  He exercises regularly, walking for 2 miles 3-4 days a week and doing yard work     Patient denies chest pain, SOB, leg edema, palpitations, orthopnea, paroxysmal nocturnal dyspnea, dizziness or Erythromycin Pregnancy And Lactation Text: This medication is Pregnancy Category B and is considered safe during pregnancy. It is also excreted in breast milk. Birth Control Pills Counseling: Birth Control Pill Counseling: I discussed with the patient the potential side effects of OCPs including but not limited to increased risk of stroke, heart attack, thrombophlebitis, deep venous thrombosis, hepatic adenomas, breast changes, GI upset, headaches, and depression.  The patient verbalized understanding of the proper use and possible adverse effects of OCPs. All of the patient's questions and concerns were addressed. syncope        The following portions of the patient's history were reviewed and updated as appropriate: allergies, current medications, past family history, past medical history, past social history, past surgical history and problem list     Past Medical History:   Diagnosis Date    Adenomatous colon polyp     Aortic regurgitation     Aortic stenosis     Diverticulosis     Heart murmur     Hemorrhoids     Last Assessed: 11/10/17    Hyperlipidemia     Hypertension     Serrated adenoma of colon     Visual disturbances     Vitamin D deficiency      Past Surgical History:   Procedure Laterality Date    COLONOSCOPY      COLONOSCOPY N/A 11/28/2017    Procedure: COLONOSCOPY with polypectomy, random bx, and hemorrhoid banding;  Surgeon: Delilah Weber MD;  Location: [facility]; Service: General    COLONOSCOPY N/A 2/26/2018    Procedure: COLONOSCOPY;  Surgeon: Mikki Kellogg MD;  Location: [facility]; Service: Gastroenterology    MOUTH SURGERY      ND COLONOSCOPY FLX DX W/COLLJ [address] WHEN PFRMD N/A 11/29/2016    Procedure: COLONOSCOPY POSSIBLE BANDING;  Surgeon: Delilah Weber MD;  Location: [facility];   Service: General     Family History   Problem Relation Age of Onset    Asthma Mother     Heart failure Mother         Congestive    Depression Mother     Alcohol abuse Father     Stroke Father         CVA    Thyroid disease Sister     Heart attack Brother         Myocardial Infarction    Depression Brother     Stroke Family         Syndrome     Social History     Socioeconomic History    Marital status: /Civil Union     Spouse name: Not on file    Number of children: Not on file    Years of education: Not on file    Highest education level: Not on file   Occupational History    Not on file   Social Needs    Financial resource strain: Not on file    Food insecurity:     Worry: Not on file     Inability: Not on file    Transportation needs:     Medical: Not on file Detail Level: Zone Non-medical: Not on file   Tobacco Use    Smoking status: Never Smoker    Smokeless tobacco: Never Used   Substance and Sexual Activity    Alcohol use: Yes     Comment: 2 beers daily  Per [employer]: minimal usage    Drug use: No    Sexual activity: Not on file   Lifestyle    Physical activity:     Days per week: Not on file     Minutes per session: Not on file    Stress: Not on file   Relationships    Social connections:     Talks on phone: Not on file     Gets together: Not on file     Attends [employer]ism service: Not on file     Active member of club or organization: Not on file     Attends meetings of clubs or organizations: Not on file     Relationship status: Not on file    Intimate partner violence:     Fear of current or ex partner: Not on file     Emotionally abused: Not on file     Physically abused: Not on file     Forced sexual activity: Not on file   Other Topics Concern    Not on file   Social History Narrative    Caffeine use: 3 cups coffe per day    Has smoke detectors    Uses safety equipment: seatbelts       Current Outpatient Medications:     aspirin 81 MG tablet, Take 81 mg by mouth daily, Disp: , Rfl:     atorvastatin (LIPITOR) 40 mg tablet, Take 1 tablet (40 mg total) by mouth daily, Disp: 90 tablet, Rfl: 3    Cholecalciferol (VITAMIN D-400 PO), Take by mouth, Disp: , Rfl:     losartan (COZAAR) 100 MG tablet, Take 1 tablet (100 mg total) by mouth daily, Disp: 90 tablet, Rfl: 3    Multiple Vitamins-Minerals (CENTRUM SILVER PO), Take by mouth daily, Disp: , Rfl:     Omega-3 Fatty Acids (OMEGA 3 PO), Take by mouth daily  , Disp: , Rfl:     docusate sodium (COLACE) 100 mg capsule, Take 1 capsule by mouth 2 (two) times a day for 30 days, Disp: 60 capsule, Rfl: 0    docusate sodium (COLACE) 50 mg capsule, Take by mouth daily, Disp: , Rfl:     Review of Systems   Constitutional: Negative for appetite change, chills, fatigue, fever and unexpected weight change     Respiratory: Negative Sarecycline Counseling: Patient advised regarding possible photosensitivity and discoloration of the teeth, skin, lips, tongue and gums.  Patient instructed to avoid sunlight, if possible.  When exposed to sunlight, patients should wear protective clothing, sunglasses, and sunscreen.  The patient was instructed to call the office immediately if the following severe adverse effects occur:  hearing changes, easy bruising/bleeding, severe headache, or vision changes.  The patient verbalized understanding of the proper use and possible adverse effects of sarecycline.  All of the patient's questions and concerns were addressed. for cough, shortness of breath and wheezing  Cardiovascular: Negative for chest pain, palpitations and leg swelling  Gastrointestinal: Negative for abdominal pain, blood in stool, constipation, diarrhea, nausea and vomiting  Genitourinary: Negative for difficulty urinating, dysuria, frequency, hematuria and urgency  Musculoskeletal: Negative for arthralgias, joint swelling and myalgias  Skin: Negative for rash  Neurological: Negative for dizziness, syncope, weakness, numbness and headaches  Hematological: Negative for adenopathy  Psychiatric/Behavioral: Negative for dysphoric mood and sleep disturbance  The patient is not nervous/anxious  Objective:    Vitals:    09/27/19 1117 09/27/19 1147   BP: 144/80 140/78   BP Location: Left arm Left arm   Patient Position: Sitting Sitting   Cuff Size: Standard Standard   Pulse: 70    Resp: 16    Weight: 82 5 kg (181 lb 12 8 oz)    Height: 5' 8" (1 727 m)      BP Readings from Last 3 Encounters:   09/27/19 140/78   07/09/19 140/84   03/27/19 130/80     Wt Readings from Last 3 Encounters:   09/27/19 82 5 kg (181 lb 12 8 oz)   07/09/19 82 7 kg (182 lb 6 4 oz)   03/27/19 83 2 kg (183 lb 6 4 oz)      Physical Exam   Constitutional: He is oriented to person, place, and time  He appears well-developed and well-nourished  No distress  Neck: Neck supple  No thyromegaly present  Cardiovascular: Normal rate, regular rhythm and normal heart sounds  No murmur heard  Pulmonary/Chest: Effort normal  No respiratory distress  He has no wheezes  He has no rhonchi  He has no rales  Abdominal: Soft  He exhibits no distension and no mass  There is no tenderness  Musculoskeletal: He exhibits no edema  Lymphadenopathy:     He has no cervical adenopathy  Neurological: He is alert and oriented to person, place, and time  Psychiatric: He has a normal mood and affect   His behavior is normal  Thought content normal        Appointment on 09/17/2019   Component Date Value Ref Range Status    Cholesterol 09/17/2019 208* 50 - 200 mg/dL Final      Cholesterol:       Desirable         <200 mg/dl       Borderline         200-239 mg/dl       High              >239           Triglycerides 09/17/2019 166* <=150 mg/dL Final      Triglyceride:     Normal          <150 mg/dl     Borderline High 150-199 mg/dl     High            200-499 mg/dl        Very High       >499 mg/dl    Specimen collection should occur prior to N-Acetylcysteine or Metamizole administration due to the potential for falsely depressed results   HDL, Direct 09/17/2019 44  40 - 60 mg/dL Final      HDL Cholesterol:       High    >60 mg/dL       Low     <41 mg/dL  Specimen collection should occur prior to Metamizole administration due to the potential for falsley depressed results   LDL Calculated 09/17/2019 131* 0 - 100 mg/dL Final      This screening LDL is a calculated result  It does not have the accuracy of the Direct Measured LDL in the monitoring of patients with hyperlipidemia and/or statin therapy  Direct Measure LDL (RPC180) must be ordered separately in these patients  LDL Cholesterol:     Optimal           <100 mg/dl     Near Optimal      100-129 mg/dl     Above Optimal       Borderline High 130-159 mg/dl       High            160-189 mg/dl       Very High       >189 mg/dl           Sodium 09/17/2019 140  136 - 145 mmol/L Final    Potassium 09/17/2019 4 4  3 5 - 5 3 mmol/L Final    Chloride 09/17/2019 108  100 - 108 mmol/L Final    CO2 09/17/2019 26  21 - 32 mmol/L Final    ANION GAP 09/17/2019 6  4 - 13 mmol/L Final    BUN 09/17/2019 12  5 - 25 mg/dL Final    Creatinine 09/17/2019 0 75  0 60 - 1 30 mg/dL Final    Standardized to IDMS reference method    Glucose, Fasting 09/17/2019 93  65 - 99 mg/dL Final      Specimen collection should occur prior to Sulfasalazine administration due to the potential for falsely depressed results   Specimen collection should occur prior to Topical Sulfur Applications Counseling: Topical Sulfur Counseling: Patient counseled that this medication may cause skin irritation or allergic reactions.  In the event of skin irritation, the patient was advised to reduce the amount of the drug applied or use it less frequently.   The patient verbalized understanding of the proper use and possible adverse effects of topical sulfur application.  All of the patient's questions and concerns were addressed. Include Pregnancy/Lactation Warning?: No Sulfapyridine administration due to the potential for falsely elevated results   Calcium 09/17/2019 9 4  8 3 - 10 1 mg/dL Final    AST 09/17/2019 19  5 - 45 U/L Final      Specimen collection should occur prior to Sulfasalazine administration due to the potential for falsely depressed results   ALT 09/17/2019 36  12 - 78 U/L Final      Specimen collection should occur prior to Sulfasalazine and/or Sulfapyridine administration due to the potential for falsely depressed results   Alkaline Phosphatase 09/17/2019 93  46 - 116 U/L Final    Total Protein 09/17/2019 7 7  6 4 - 8 2 g/dL Final    Albumin 09/17/2019 4 3  3 5 - 5 0 g/dL Final    Total Bilirubin 09/17/2019 0 68  0 20 - 1 00 mg/dL Final    eGFR 09/17/2019 95  ml/min/1 73sq m Final    TSH 3RD GENERATON 09/17/2019 3 760* 0 358 - 3 740 uIU/mL Final      Using supplements with high doses of biotin 20 to more than 300 times greater than the adequate daily intake for adults of 30 mcg/day as established by the [place] [employer], can cause falsely depress results   PSA 09/17/2019 2 6  0 0 - 4 0 ng/mL Final      American Urological Association Guidelines define biochemical recurrence of prostate cancer as a detectable or rising PSA value post-radical prostatectomy that is greater than or equal to 0 2 ng/mL with a second confirmatory level of greater than or equal to 0 2 ng/mL   Free T4 09/17/2019 0 78  0 76 - 1 46 ng/dL Final      Specimen collection should occur prior to Sulfasalazine administration due to the potential for falsely elevated results  BMI Counseling: Body mass index is 27 64 kg/m²  The BMI is above normal  Nutrition recommendations include consuming healthier snacks, moderation in carbohydrate intake and reducing intake of saturated fat and trans fat  Exercise recommendations include exercising 3-5 times per week  Dapsone Counseling: I discussed with the patient the risks of dapsone including but not limited to hemolytic anemia, agranulocytosis, rashes, methemoglobinemia, kidney failure, peripheral neuropathy, headaches, GI upset, and liver toxicity.  Patients who start dapsone require monitoring including baseline LFTs and weekly CBCs for the first month, then every month thereafter.  The patient verbalized understanding of the proper use and possible adverse effects of dapsone.  All of the patient's questions and concerns were addressed. Minocycline Counseling: Patient advised regarding possible photosensitivity and discoloration of the teeth, skin, lips, tongue and gums.  Patient instructed to avoid sunlight, if possible.  When exposed to sunlight, patients should wear protective clothing, sunglasses, and sunscreen.  The patient was instructed to call the office immediately if the following severe adverse effects occur:  hearing changes, easy bruising/bleeding, severe headache, or vision changes.  The patient verbalized understanding of the proper use and possible adverse effects of minocycline.  All of the patient's questions and concerns were addressed. Doxycycline Pregnancy And Lactation Text: This medication is Pregnancy Category D and not consider safe during pregnancy. It is also excreted in breast milk but is considered safe for shorter treatment courses. Tetracycline Counseling: Patient counseled regarding possible photosensitivity and increased risk for sunburn.  Patient instructed to avoid sunlight, if possible.  When exposed to sunlight, patients should wear protective clothing, sunglasses, and sunscreen.  The patient was instructed to call the office immediately if the following severe adverse effects occur:  hearing changes, easy bruising/bleeding, severe headache, or vision changes.  The patient verbalized understanding of the proper use and possible adverse effects of tetracycline.  All of the patient's questions and concerns were addressed. Patient understands to avoid pregnancy while on therapy due to potential birth defects. Benzoyl Peroxide Pregnancy And Lactation Text: This medication is Pregnancy Category C. It is unknown if benzoyl peroxide is excreted in breast milk. Topical Retinoid Pregnancy And Lactation Text: This medication is Pregnancy Category C. It is unknown if this medication is excreted in breast milk. Winlevi Counseling:  I discussed with the patient the risks of topical clascoterone including but not limited to erythema, scaling, itching, and stinging. Patient voiced their understanding. Doxycycline Counseling:  Patient counseled regarding possible photosensitivity and increased risk for sunburn.  Patient instructed to avoid sunlight, if possible.  When exposed to sunlight, patients should wear protective clothing, sunglasses, and sunscreen.  The patient was instructed to call the office immediately if the following severe adverse effects occur:  hearing changes, easy bruising/bleeding, severe headache, or vision changes.  The patient verbalized understanding of the proper use and possible adverse effects of doxycycline.  All of the patient's questions and concerns were addressed. Dapsone Pregnancy And Lactation Text: This medication is Pregnancy Category C and is not considered safe during pregnancy or breast feeding. High Dose Vitamin A Pregnancy And Lactation Text: High dose vitamin A therapy is contraindicated during pregnancy and breast feeding. Aklief Pregnancy And Lactation Text: It is unknown if this medication is safe to use during pregnancy.  It is unknown if this medication is excreted in breast milk.  Breastfeeding women should use the topical cream on the smallest area of the skin for the shortest time needed while breastfeeding.  Do not apply to nipple and areola. Isotretinoin Pregnancy And Lactation Text: This medication is Pregnancy Category X and is considered extremely dangerous during pregnancy. It is unknown if it is excreted in breast milk. Azithromycin Counseling:  I discussed with the patient the risks of azithromycin including but not limited to GI upset, allergic reaction, drug rash, diarrhea, and yeast infections. Winlevi Pregnancy And Lactation Text: This medication is considered safe during pregnancy and breastfeeding. Tazorac Counseling:  Patient advised that medication is irritating and drying.  Patient may need to apply sparingly and wash off after an hour before eventually leaving it on overnight.  The patient verbalized understanding of the proper use and possible adverse effects of tazorac.  All of the patient's questions and concerns were addressed. Bactrim Counseling:  I discussed with the patient the risks of sulfa antibiotics including but not limited to GI upset, allergic reaction, drug rash, diarrhea, dizziness, photosensitivity, and yeast infections.  Rarely, more serious reactions can occur including but not limited to aplastic anemia, agranulocytosis, methemoglobinemia, blood dyscrasias, liver or kidney failure, lung infiltrates or desquamative/blistering drug rashes. Spironolactone Counseling: Patient advised regarding risks of diarrhea, abdominal pain, hyperkalemia, birth defects (for female patients), liver toxicity and renal toxicity. The patient may need blood work to monitor liver and kidney function and potassium levels while on therapy. The patient verbalized understanding of the proper use and possible adverse effects of spironolactone.  All of the patient's questions and concerns were addressed. Azelaic Acid Pregnancy And Lactation Text: This medication is considered safe during pregnancy and breast feeding. Topical Clindamycin Counseling: Patient counseled that this medication may cause skin irritation or allergic reactions.  In the event of skin irritation, the patient was advised to reduce the amount of the drug applied or use it less frequently.   The patient verbalized understanding of the proper use and possible adverse effects of clindamycin.  All of the patient's questions and concerns were addressed. Bactrim Pregnancy And Lactation Text: This medication is Pregnancy Category D and is known to cause fetal risk.  It is also excreted in breast milk. Sarecycline Pregnancy And Lactation Text: This medication is Pregnancy Category D and not consider safe during pregnancy. It is also excreted in breast milk. Birth Control Pills Pregnancy And Lactation Text: This medication should be avoided if pregnant and for the first 30 days post-partum. Erythromycin Counseling:  I discussed with the patient the risks of erythromycin including but not limited to GI upset, allergic reaction, drug rash, diarrhea, increase in liver enzymes, and yeast infections. Benzoyl Peroxide Counseling: Patient counseled that medicine may cause skin irritation and bleach clothing.  In the event of skin irritation, the patient was advised to reduce the amount of the drug applied or use it less frequently.   The patient verbalized understanding of the proper use and possible adverse effects of benzoyl peroxide.  All of the patient's questions and concerns were addressed. Topical Clindamycin Pregnancy And Lactation Text: This medication is Pregnancy Category B and is considered safe during pregnancy. It is unknown if it is excreted in breast milk. Spironolactone Pregnancy And Lactation Text: This medication can cause feminization of the male fetus and should be avoided during pregnancy. The active metabolite is also found in breast milk. Topical Sulfur Applications Pregnancy And Lactation Text: This medication is Pregnancy Category C and has an unknown safety profile during pregnancy. It is unknown if this topical medication is excreted in breast milk. Topical Retinoid counseling:  Patient advised to apply a pea-sized amount only at bedtime and wait 30 minutes after washing their face before applying.  If too drying, patient may add a non-comedogenic moisturizer. The patient verbalized understanding of the proper use and possible adverse effects of retinoids.  All of the patient's questions and concerns were addressed. High Dose Vitamin A Counseling: Side effects reviewed, pt to contact office should one occur. Aklief counseling:  Patient advised to apply a pea-sized amount only at bedtime and wait 30 minutes after washing their face before applying.  If too drying, patient may add a non-comedogenic moisturizer.  The most commonly reported side effects including irritation, redness, scaling, dryness, stinging, burning, itching, and increased risk of sunburn.  The patient verbalized understanding of the proper use and possible adverse effects of retinoids.  All of the patient's questions and concerns were addressed. Isotretinoin Counseling: Patient should get monthly blood tests, not donate blood, not drive at night if vision affected, not share medication, and not undergo elective surgery for 6 months after tx completed. Side effects reviewed, pt to contact office should one occur. Azithromycin Pregnancy And Lactation Text: This medication is considered safe during pregnancy and is also secreted in breast milk. Azelaic Acid Counseling: Patient counseled that medicine may cause skin irritation and to avoid applying near the eyes.  In the event of skin irritation, the patient was advised to reduce the amount of the drug applied or use it less frequently.   The patient verbalized understanding of the proper use and possible adverse effects of azelaic acid.  All of the patient's questions and concerns were addressed. Tazorac Pregnancy And Lactation Text: This medication is not safe during pregnancy. It is unknown if this medication is excreted in breast milk.

## 2024-12-18 ENCOUNTER — OFFICE VISIT (OUTPATIENT)
Age: 74
End: 2024-12-18
Payer: MEDICARE

## 2024-12-18 VITALS
HEIGHT: 69 IN | SYSTOLIC BLOOD PRESSURE: 140 MMHG | WEIGHT: 178 LBS | OXYGEN SATURATION: 96 % | DIASTOLIC BLOOD PRESSURE: 78 MMHG | HEART RATE: 60 BPM | BODY MASS INDEX: 26.36 KG/M2

## 2024-12-18 DIAGNOSIS — I35.1 AORTIC VALVE INSUFFICIENCY, ETIOLOGY OF CARDIAC VALVE DISEASE UNSPECIFIED: Primary | ICD-10-CM

## 2024-12-18 DIAGNOSIS — I10 ESSENTIAL HYPERTENSION: ICD-10-CM

## 2024-12-18 PROCEDURE — 99213 OFFICE O/P EST LOW 20 MIN: CPT | Performed by: INTERNAL MEDICINE

## 2024-12-18 PROCEDURE — 93000 ELECTROCARDIOGRAM COMPLETE: CPT | Performed by: INTERNAL MEDICINE

## 2024-12-18 RX ORDER — AMOXICILLIN 500 MG/1
500 CAPSULE ORAL
COMMUNITY
Start: 2024-11-04

## 2024-12-18 NOTE — PROGRESS NOTES
Cardiology Follow Up Visit    Denilson Dial  1950  [id number]  [facility]  [address]  [address]  [facility] [place] [id number] [phone]    1. Aortic valve insufficiency, etiology of cardiac valve disease unspecified  POCT ECG    Echo complete w/ contrast if indicated      2. Essential hypertension  POCT ECG    Echo complete w/ contrast if indicated            Discussion/Summary:       1. History of bicuspid aortic valve with 1 to 2+ aortic valve regurgitation, no major interval change echocardiogram this year.  No significant aortic valve stenosis     2. Essential hypertension, mostly controlled     3. Mild ascending aortic root upper normal/mildly dilated, echo this year measurement wnl     4. Hyperlipidemia, on atorvastatin 40 mg daily, primary prevention,' doing well.    5. Remote hx amaurosis fugax, 2016, carotid duplex no stenosis    Plan:  Patient 1 to 2+/ mild-to-moderate aortic valve regurgitation stable by echocardiogram this year.  Feels well. Sensible diet/exercise encouraged. No med changes. Echo with next years visit      Interval History:    74-year-old male follow-up bicuspid aortic valve with 1-2+ aortic valve regurgitation     No significant aortic valve stenosis     Essential hypertension which has been well controlled for the most part.  Slight intake elevation blood pressure today.      Top normal diameter of ascending aorta 38 to up to 39 mm    Hyperlipidemia with on primary prevention statin at 40 mg.  LDL around 100.    Remote history of amaurosis fugax 2016, carotid duplex no stenosis.      Feels well.  No complaints.    Patient Active Problem List   Diagnosis    Serrated adenoma of colon    Aortic regurgitation    Pure hypercholesterolemia    Essential hypertension    Bicuspid aortic valve    H/O amaurosis fugax 2016    Dilated aortic root (HCC)    BPH associated with nocturia     Past  Medical History:   Diagnosis Date    Adenomatous colon polyp     Aortic regurgitation     Aortic stenosis     Coronary artery disease Mytral valve, congenital    Diverticulosis     Heart murmur     Hemorrhoids     Last Assessed: 11/10/17    Hyperlipidemia     Hypertension     Serrated adenoma of colon     Visual disturbances     Visual impairment     Vitamin D deficiency      Social History     Socioeconomic History    Marital status: /Civil Union     Spouse name: Not on file    Number of children: Not on file    Years of education: Not on file    Highest education level: Not on file   Occupational History    Not on file   Tobacco Use    Smoking status: Never    Smokeless tobacco: Never   Vaping Use    Vaping status: Never Used   Substance and Sexual Activity    Alcohol use: Not Currently    Drug use: No    Sexual activity: Yes     Partners: Female   Other Topics Concern    Not on file   Social History Narrative    Caffeine use: 3 cups coffe per day    Has smoke detectors    Uses safety equipment: seatbelts     Social Drivers of Health     Financial Resource Strain: Low Risk  (8/10/2023)    Overall Financial Resource Strain (CARDIA)     Difficulty of Paying Living Expenses: Not very hard   Food Insecurity: No Food Insecurity (8/16/2024)    Nursing - Inadequate Food Risk Classification     Worried About Running Out of Food in the Last Year: Never true     Ran Out of Food in the Last Year: Never true     Ran Out of Food in the Last Year: Not on file   Transportation Needs: No Transportation Needs (8/16/2024)    PRAPARE - Transportation     Lack of Transportation (Medical): No     Lack of Transportation (Non-Medical): No   Physical Activity: Not on file   Stress: Not on file   Social Connections: Not on file   Intimate Partner Violence: Not on file   Housing Stability: Low Risk  (8/16/2024)    Housing Stability Vital Sign     Unable to Pay for Housing in the Last Year: No     Number of Times Moved in the Last  Year: 0     Homeless in the Last Year: No      Family History   Problem Relation Age of Onset    Asthma Mother     Heart failure Mother         Congestive    Depression Mother     COPD Mother     Alcohol abuse Father     Stroke Father         CVA    Thyroid disease Sister     Heart attack Brother         Myocardial Infarction    Depression Brother     Stroke Family         Syndrome     Past Surgical History:   Procedure Laterality Date    COLONOSCOPY      COLONOSCOPY N/A 11/28/2017    Procedure: COLONOSCOPY with polypectomy, random bx, and hemorrhoid banding;  Surgeon: Evelyn Yanez MD;  Location: [facility];  Service: General    COLONOSCOPY N/A 2/26/2018    Procedure: COLONOSCOPY;  Surgeon: Virgilio Rhodes MD;  Location: [facility];  Service: Gastroenterology    MOUTH SURGERY      FL COLONOSCOPY FLX DX W/COLLJ SPEC WHEN PFRMD N/A 11/29/2016    Procedure: COLONOSCOPY POSSIBLE BANDING;  Surgeon: Evelyn Yanez MD;  Location: [facility];  Service: General       Current Outpatient Medications:     amoxicillin (AMOXIL) 500 mg capsule, 500 mg Takes prior to dental procedures. Takes 4 capsules 1 hour prior., Disp: , Rfl:     aspirin 81 MG tablet, Take 81 mg by mouth daily, Disp: , Rfl:     atorvastatin (LIPITOR) 40 mg tablet, TAKE 1 TABLET BY MOUTH EVERY DAY, Disp: 90 tablet, Rfl: 3    brimonidine tartrate 0.2 % ophthalmic solution, INSTILL 1 DROP IN EACH EYE TWO TIMES A DAY, Disp: , Rfl:     Cholecalciferol (VITAMIN D-400 PO), Take by mouth, Disp: , Rfl:     docusate sodium (COLACE) 50 mg capsule, Take by mouth daily, Disp: , Rfl:     ketoconazole (NIZORAL) 2 % cream, as needed , Disp: , Rfl:     losartan (COZAAR) 100 MG tablet, TAKE 1 TABLET BY MOUTH EVERY DAY, Disp: 90 tablet, Rfl: 3    Multiple Vitamins-Minerals (CENTRUM SILVER PO), Take by mouth daily, Disp: , Rfl:     Omega-3 Fatty Acids (OMEGA 3 PO), Take by mouth daily  , Disp: , Rfl:     oxybutynin (DITROPAN-XL) 10 MG 24 hr tablet, Take 1 tablet (10 mg  "total) by mouth daily at bedtime, Disp: 90 tablet, Rfl: 3  Allergies   Allergen Reactions    Sulfa Antibiotics Rash     redness         Social, Family, Medication history reviewed and updated as necessary      Labs:     Lab Results   Component Value Date     09/01/2015    K 5.2 08/13/2024     08/13/2024    CO2 28 08/13/2024    BUN 13 08/13/2024    CREATININE 0.77 08/13/2024    CREATININE 0.76 01/23/2024    GLUCOSE 94 09/01/2015    CALCIUM 9.9 08/13/2024       Lab Results   Component Value Date    WBC 5.45 01/23/2024    HGB 15.0 01/23/2024    HGB 16.2 12/21/2022    HCT 44.2 01/23/2024    HCT 48.2 12/21/2022     01/23/2024     12/21/2022       Lab Results   Component Value Date    CHOL 196 09/01/2015    CHOL 224 02/27/2015     Lab Results   Component Value Date    HDL 40 08/13/2024    HDL 42 01/23/2024     Lab Results   Component Value Date    LDLCALC 111 (H) 08/13/2024    LDLCALC 149 (H) 01/23/2024     No results found for: \"LDLDIRECT\"          Lab Results   Component Value Date    TRIG 98 08/13/2024    TRIG 140 01/23/2024       Lab Results   Component Value Date    ALT 22 08/13/2024    ALT 37 01/23/2024    AST 25 08/13/2024    AST 28 01/23/2024    ALKPHOS 89 08/13/2024    ALKPHOS 85 01/23/2024       No results found for: \"INR\"    No results found for: \"NTBNP\"    Lab Results   Component Value Date    HGBA1C 5.6 12/21/2022           Imaging: Reviewed in epic        Review of Systems:  14 systems reviewed and negative with exception of the above        PHYSICAL EXAM:      Vitals:    12/18/24 1011   BP: 140/78   Pulse: 60   SpO2: 96%     Body mass index is 26.29 kg/m².   Weight (last 2 days)       Date/Time Weight    12/18/24 1011 80.7 (178)            130/80 bp recheck me  Gen: No acute distress  HEENT: anicteric, mucous membranes moist  Neck: supple, no jugular venous distention, or carotid bruit  Heart: regular, normal s1 and s2,2/6 zoë  Lungs :clear to auscultation bilaterally, no " rales/rhonchi or wheeze  Abdomen: soft nontender, normoactive bowel sounds, no organomegaly  Ext: warm and perfused, normal femoral pulses, no edema, or clubbing  Skin: warm, no rashes  Neuro: AAO x 3, no focal findings  Psychiatric: normal affect  Musculoskeletal: no obvious joint deformities.        This note was completed in part utilizing [employer] direct voice recognition software.   Grammatical errors, random word insertion, spelling mistakes, and incomplete sentences may be an occasional consequence of the system secondary to software limitations, ambient noise and hardware issues. At the time of dictation, efforts were made to edit, clarify and /or correct errors.  Please read the chart carefully and recognize, using context, where substitutions have occurred.  If you have any questions or concerns about the context, text or information contained within the body of this dictation, please contact myself, the provider, for further clarification.

## 2025-01-01 DIAGNOSIS — I10 ESSENTIAL HYPERTENSION: ICD-10-CM

## 2025-01-01 DIAGNOSIS — E78.5 HYPERLIPIDEMIA, UNSPECIFIED HYPERLIPIDEMIA TYPE: ICD-10-CM

## 2025-01-01 RX ORDER — LOSARTAN POTASSIUM 100 MG/1
100 TABLET ORAL DAILY
Qty: 90 TABLET | Refills: 1 | Status: SHIPPED | OUTPATIENT
Start: 2025-01-01 | End: 2025-01-02 | Stop reason: SDUPTHER

## 2025-01-01 RX ORDER — ATORVASTATIN CALCIUM 40 MG/1
40 TABLET, FILM COATED ORAL DAILY
Qty: 90 TABLET | Refills: 1 | Status: SHIPPED | OUTPATIENT
Start: 2025-01-01 | End: 2025-01-02 | Stop reason: SDUPTHER

## 2025-01-02 DIAGNOSIS — N40.1 BPH ASSOCIATED WITH NOCTURIA: ICD-10-CM

## 2025-01-02 DIAGNOSIS — R35.1 BPH ASSOCIATED WITH NOCTURIA: ICD-10-CM

## 2025-01-02 RX ORDER — LOSARTAN POTASSIUM 100 MG/1
100 TABLET ORAL DAILY
Qty: 90 TABLET | Refills: 1 | Status: SHIPPED | OUTPATIENT
Start: 2025-01-02

## 2025-01-02 RX ORDER — OXYBUTYNIN CHLORIDE 10 MG/1
10 TABLET, EXTENDED RELEASE ORAL
Qty: 90 TABLET | Refills: 1 | Status: SHIPPED | OUTPATIENT
Start: 2025-01-02

## 2025-01-02 RX ORDER — ATORVASTATIN CALCIUM 40 MG/1
40 TABLET, FILM COATED ORAL DAILY
Qty: 90 TABLET | Refills: 1 | Status: SHIPPED | OUTPATIENT
Start: 2025-01-02

## 2025-01-02 NOTE — TELEPHONE ENCOUNTER
Reason for call:   [x] Refill   [] Prior Auth  [] Other:     Office:   [] PCP/Provider -   [x] Specialty/Provider - uro    Medication: oxybutynin (DITROPAN-XL) 10 MG 24 hr tablet     Dose/Frequency:   Sig: Take 1 tablet (10 mg total) by mouth daily at bedtime            Quantity: 90    Pharmacy: [facility]    Does the patient have enough for 3 days?   [x] Yes   [] No - Send as HP to POD

## 2025-01-02 NOTE — TELEPHONE ENCOUNTER
Change pharmacy    Reason for call:   [x] Refill   [] Prior Auth  [] Other:     Office:   [x] PCP/Provider - [facility]  Authorized By: Stephanie Knutson MD    [] Specialty/Provider -     Medication: atorvastatin (LIPITOR) 40 mg tablet    Dose/Frequency: TAKE 1 TABLET BY MOUTH EVERY DAY    Quantity: 90 tablet    Pharmacy: [place] [id number]     Does the patient have enough for 3 days?   [x] Yes   [] No - Send as HP to POD    Reason for call:   [x] Refill   [] Prior Auth  [] Other:     Office:   [x] PCP/Provider - [facility]  Authorized By: Stephanie Knutson MD    [] Specialty/Provider -     Medication:  losartan (COZAAR) 100 MG tablet    Dose/Frequency: TAKE 1 TABLET BY MOUTH EVERY DAY    Quantity: 90 tablet    Pharmacy: [place] [id number]     Does the patient have enough for 3 days?   [x] Yes   [] No - Send as HP to POD

## 2025-01-31 ENCOUNTER — APPOINTMENT (OUTPATIENT)
Dept: LAB | Facility: CLINIC | Age: 75
End: 2025-01-31
Payer: MEDICARE

## 2025-01-31 DIAGNOSIS — R73.01 ELEVATED FASTING GLUCOSE: ICD-10-CM

## 2025-01-31 DIAGNOSIS — E78.5 HYPERLIPIDEMIA, UNSPECIFIED HYPERLIPIDEMIA TYPE: ICD-10-CM

## 2025-01-31 DIAGNOSIS — Z12.5 SCREENING FOR PROSTATE CANCER: ICD-10-CM

## 2025-01-31 DIAGNOSIS — I10 ESSENTIAL HYPERTENSION: ICD-10-CM

## 2025-01-31 LAB
ALBUMIN SERPL BCG-MCNC: 4.2 G/DL (ref 3.5–5)
ALP SERPL-CCNC: 101 U/L (ref 34–104)
ALT SERPL W P-5'-P-CCNC: 26 U/L (ref 7–52)
ANION GAP SERPL CALCULATED.3IONS-SCNC: 8 MMOL/L (ref 4–13)
AST SERPL W P-5'-P-CCNC: 24 U/L (ref 13–39)
BASOPHILS # BLD AUTO: 0.05 THOUSANDS/ΜL (ref 0–0.1)
BASOPHILS NFR BLD AUTO: 1 % (ref 0–1)
BILIRUB SERPL-MCNC: 0.69 MG/DL (ref 0.2–1)
BUN SERPL-MCNC: 14 MG/DL (ref 5–25)
CALCIUM SERPL-MCNC: 9.7 MG/DL (ref 8.4–10.2)
CHLORIDE SERPL-SCNC: 102 MMOL/L (ref 96–108)
CHOLEST SERPL-MCNC: 199 MG/DL (ref ?–200)
CO2 SERPL-SCNC: 29 MMOL/L (ref 21–32)
CREAT SERPL-MCNC: 0.7 MG/DL (ref 0.6–1.3)
EOSINOPHIL # BLD AUTO: 0.15 THOUSAND/ΜL (ref 0–0.61)
EOSINOPHIL NFR BLD AUTO: 3 % (ref 0–6)
ERYTHROCYTE [DISTWIDTH] IN BLOOD BY AUTOMATED COUNT: 12.7 % (ref 11.6–15.1)
GFR SERPL CREATININE-BSD FRML MDRD: 93 ML/MIN/1.73SQ M
GLUCOSE P FAST SERPL-MCNC: 106 MG/DL (ref 65–99)
HCT VFR BLD AUTO: 47.2 % (ref 36.5–49.3)
HDLC SERPL-MCNC: 43 MG/DL
HGB BLD-MCNC: 16 G/DL (ref 12–17)
IMM GRANULOCYTES # BLD AUTO: 0.01 THOUSAND/UL (ref 0–0.2)
IMM GRANULOCYTES NFR BLD AUTO: 0 % (ref 0–2)
LDLC SERPL CALC-MCNC: 134 MG/DL (ref 0–100)
LYMPHOCYTES # BLD AUTO: 1.21 THOUSANDS/ΜL (ref 0.6–4.47)
LYMPHOCYTES NFR BLD AUTO: 22 % (ref 14–44)
MCH RBC QN AUTO: 31 PG (ref 26.8–34.3)
MCHC RBC AUTO-ENTMCNC: 33.9 G/DL (ref 31.4–37.4)
MCV RBC AUTO: 92 FL (ref 82–98)
MONOCYTES # BLD AUTO: 0.71 THOUSAND/ΜL (ref 0.17–1.22)
MONOCYTES NFR BLD AUTO: 13 % (ref 4–12)
NEUTROPHILS # BLD AUTO: 3.43 THOUSANDS/ΜL (ref 1.85–7.62)
NEUTS SEG NFR BLD AUTO: 61 % (ref 43–75)
NRBC BLD AUTO-RTO: 0 /100 WBCS
PLATELET # BLD AUTO: 224 THOUSANDS/UL (ref 149–390)
PMV BLD AUTO: 10.8 FL (ref 8.9–12.7)
POTASSIUM SERPL-SCNC: 5.1 MMOL/L (ref 3.5–5.3)
PROT SERPL-MCNC: 7.4 G/DL (ref 6.4–8.4)
PSA SERPL-MCNC: 2.73 NG/ML (ref 0–4)
RBC # BLD AUTO: 5.16 MILLION/UL (ref 3.88–5.62)
SODIUM SERPL-SCNC: 139 MMOL/L (ref 135–147)
TRIGL SERPL-MCNC: 109 MG/DL (ref ?–150)
WBC # BLD AUTO: 5.56 THOUSAND/UL (ref 4.31–10.16)

## 2025-01-31 PROCEDURE — 80061 LIPID PANEL: CPT

## 2025-01-31 PROCEDURE — G0103 PSA SCREENING: HCPCS

## 2025-01-31 PROCEDURE — 36415 COLL VENOUS BLD VENIPUNCTURE: CPT

## 2025-01-31 PROCEDURE — 85025 COMPLETE CBC W/AUTO DIFF WBC: CPT

## 2025-01-31 PROCEDURE — 80053 COMPREHEN METABOLIC PANEL: CPT

## 2025-02-18 ENCOUNTER — RA CDI HCC (OUTPATIENT)
Dept: OTHER | Facility: HOSPITAL | Age: 75
End: 2025-02-18

## 2025-02-24 ENCOUNTER — OFFICE VISIT (OUTPATIENT)
Dept: FAMILY MEDICINE CLINIC | Facility: CLINIC | Age: 75
End: 2025-02-24
Payer: MEDICARE

## 2025-02-24 VITALS
TEMPERATURE: 97.5 F | WEIGHT: 182 LBS | HEIGHT: 69 IN | OXYGEN SATURATION: 98 % | HEART RATE: 65 BPM | DIASTOLIC BLOOD PRESSURE: 86 MMHG | RESPIRATION RATE: 15 BRPM | BODY MASS INDEX: 26.96 KG/M2 | SYSTOLIC BLOOD PRESSURE: 142 MMHG

## 2025-02-24 DIAGNOSIS — E78.5 HYPERLIPIDEMIA, UNSPECIFIED HYPERLIPIDEMIA TYPE: ICD-10-CM

## 2025-02-24 DIAGNOSIS — N40.1 BENIGN PROSTATIC HYPERPLASIA WITH LOWER URINARY TRACT SYMPTOMS, SYMPTOM DETAILS UNSPECIFIED: ICD-10-CM

## 2025-02-24 DIAGNOSIS — R73.01 ELEVATED FASTING GLUCOSE: ICD-10-CM

## 2025-02-24 DIAGNOSIS — I10 ESSENTIAL HYPERTENSION: Primary | ICD-10-CM

## 2025-02-24 DIAGNOSIS — I35.1 AORTIC VALVE INSUFFICIENCY, ETIOLOGY OF CARDIAC VALVE DISEASE UNSPECIFIED: ICD-10-CM

## 2025-02-24 PROCEDURE — 99214 OFFICE O/P EST MOD 30 MIN: CPT | Performed by: FAMILY MEDICINE

## 2025-02-24 PROCEDURE — G2211 COMPLEX E/M VISIT ADD ON: HCPCS | Performed by: FAMILY MEDICINE

## 2025-02-24 NOTE — PROGRESS NOTES
Name: Denilson Dial      : 1950      MRN: [id number]  Encounter Provider: Stephanie Knutson MD  Encounter Date: 2025   Encounter department: [facility]  :  Assessment & Plan  Essential hypertension  - /86, not at goal, advised to continue losartan 100 mg daily, discussed low sodium diet and encouraged to increase physical activity    Orders:    Comprehensive metabolic panel; Future    TSH, 3rd generation with Free T4 reflex; Future    Hyperlipidemia, unspecified hyperlipidemia type  - , HDL 43, Tg 109, advised to continue atorvastatin 40 mg daily and work on healthy low cholesterol diet and regular exercise, will recheck lipid panel and LFT's prior to next visit in 6 months    Orders:    Lipid Panel with Direct LDL reflex; Future    Comprehensive metabolic panel; Future    Elevated fasting glucose  - reviewed dietary modifications, will recheck lab work    Orders:    Comprehensive metabolic panel; Future    Hemoglobin A1C; Future    Benign prostatic hyperplasia with lower urinary tract symptoms, symptom details unspecified  - stable on Ditropan, under care of Urology     Aortic valve insufficiency, etiology of cardiac valve disease unspecified  - under care of Cardiology       Return in 6 months or sooner as needed.   Patient understands and agrees with the treatment plan.          History of Present Illness   Patient presents today for follow up visit for hypertension, hyperlipidemia and review his lab results. Patient has no concerns at this time, he is taking his losartan as prescribed.       Review of Systems   Constitutional:  Negative for appetite change, chills, fatigue, fever and unexpected weight change.   Respiratory:  Negative for cough, shortness of breath and wheezing.    Cardiovascular:  Negative for chest pain, palpitations and leg swelling.   Gastrointestinal:  Negative for abdominal pain, blood in stool, constipation, diarrhea,  "nausea and vomiting.   Genitourinary:  Negative for dysuria, frequency and hematuria.   Musculoskeletal:  Negative for arthralgias and myalgias.   Neurological:  Negative for dizziness, syncope, weakness, numbness and headaches.   Psychiatric/Behavioral:  Negative for dysphoric mood and sleep disturbance. The patient is not nervous/anxious.        Objective   /90   Pulse 65   Temp 97.5 °F (36.4 °C)   Resp 15   Ht 5' 9\" (1.753 m)   Wt 82.6 kg (182 lb)   SpO2 98%   BMI 26.88 kg/m²      BP Readings from Last 3 Encounters:   02/24/25 142/86   12/18/24 140/78   09/09/24 136/84       Physical Exam  Constitutional:       General: He is not in acute distress.     Appearance: Normal appearance.   Cardiovascular:      Rate and Rhythm: Normal rate and regular rhythm.      Heart sounds: Normal heart sounds.   Pulmonary:      Effort: Pulmonary effort is normal.      Breath sounds: Normal breath sounds. No wheezing, rhonchi or rales.   Abdominal:      Palpations: Abdomen is soft. There is no mass.      Tenderness: There is no abdominal tenderness.   Musculoskeletal:      Right lower leg: No edema.      Left lower leg: No edema.   Neurological:      Mental Status: He is alert and oriented to person, place, and time.   Psychiatric:         Mood and Affect: Mood normal.         Behavior: Behavior normal.         Thought Content: Thought content normal.       Lab Results   Component Value Date    HGBA1C 5.6 12/21/2022     Lab Results   Component Value Date    WBC 5.56 01/31/2025    HGB 16.0 01/31/2025    HCT 47.2 01/31/2025    MCV 92 01/31/2025     01/31/2025     Lab Results   Component Value Date    [id number] 2.468 08/13/2024     Lab Results   Component Value Date    CHOLESTEROL 199 01/31/2025    CHOLESTEROL 171 08/13/2024    CHOLESTEROL 219 (H) 01/23/2024     Lab Results   Component Value Date    HDL 43 01/31/2025    HDL 40 08/13/2024    HDL 42 01/23/2024     Lab Results   Component Value Date    TRIG 109 " 01/31/2025    TRIG 98 08/13/2024    TRIG 140 01/23/2024     Lab Results   Component Value Date    LDLCALC 134 (H) 01/31/2025     Lab Results   Component Value Date     09/01/2015    SODIUM 139 01/31/2025    K 5.1 01/31/2025     01/31/2025    CO2 29 01/31/2025    ANIONGAP 9 09/01/2015    AGAP 8 01/31/2025    BUN 14 01/31/2025    CREATININE 0.70 01/31/2025    GLUC 106 01/29/2018    GLUF 106 (H) 01/31/2025    CALCIUM 9.7 01/31/2025    AST 24 01/31/2025    ALT 26 01/31/2025    ALKPHOS 101 01/31/2025    PROT 7.5 09/01/2015    TP 7.4 01/31/2025    BILITOT 0.57 09/01/2015    TBILI 0.69 01/31/2025    EGFR 93 01/31/2025

## 2025-02-24 NOTE — ASSESSMENT & PLAN NOTE
- /86, not at goal, advised to continue losartan 100 mg daily, discussed low sodium diet and encouraged to increase physical activity    Orders:    Comprehensive metabolic panel; Future    TSH, 3rd generation with Free T4 reflex; Future

## 2025-02-24 NOTE — ASSESSMENT & PLAN NOTE
- under care of Cardiology       Return in 6 months or sooner as needed.   Patient understands and agrees with the treatment plan.

## 2025-06-20 ENCOUNTER — OFFICE VISIT (OUTPATIENT)
Dept: GASTROENTEROLOGY | Facility: CLINIC | Age: 75
End: 2025-06-20
Payer: MEDICARE

## 2025-06-20 VITALS
BODY MASS INDEX: 26.36 KG/M2 | DIASTOLIC BLOOD PRESSURE: 78 MMHG | TEMPERATURE: 96.9 F | HEIGHT: 69 IN | SYSTOLIC BLOOD PRESSURE: 140 MMHG | WEIGHT: 178 LBS

## 2025-06-20 DIAGNOSIS — R14.3 FLATULENCE: ICD-10-CM

## 2025-06-20 DIAGNOSIS — K21.9 GASTROESOPHAGEAL REFLUX DISEASE WITHOUT ESOPHAGITIS: ICD-10-CM

## 2025-06-20 DIAGNOSIS — K22.4 ESOPHAGEAL DYSMOTILITY: Primary | ICD-10-CM

## 2025-06-20 DIAGNOSIS — K59.04 CHRONIC IDIOPATHIC CONSTIPATION: ICD-10-CM

## 2025-06-20 PROCEDURE — 99204 OFFICE O/P NEW MOD 45 MIN: CPT | Performed by: INTERNAL MEDICINE

## 2025-06-20 RX ORDER — POLYETHYLENE GLYCOL 3350 17 G/17G
17 POWDER, FOR SOLUTION ORAL DAILY
Qty: 289 G | Refills: 0 | Status: SHIPPED | OUTPATIENT
Start: 2025-06-20 | End: 2025-07-07

## 2025-06-20 RX ORDER — BISACODYL 5 MG/1
10 TABLET, DELAYED RELEASE ORAL ONCE
Qty: 2 TABLET | Refills: 0 | Status: SHIPPED | OUTPATIENT
Start: 2025-06-20 | End: 2025-06-20

## 2025-06-20 NOTE — PROGRESS NOTES
Name: Denilson Dial      : 1950      MRN: [id number]  Encounter Provider: Virgilio Rhodes MD  Encounter Date: 2025   Encounter department: [facility] [place]  :  Assessment & Plan  Esophageal dysmotility  - Schedule esophageal manometry to assess for corkscrew esophagus.  Orders:    EGD; Future    Esophageal manometry; Future    Chronic idiopathic constipation  Please see below  Orders:    Colonoscopy; Future    polyethylene glycol (GOLYTELY) 4000 mL solution; Take 4,000 mL by mouth once for 1 dose    bisacodyl (DULCOLAX) 5 mg EC tablet; Take 2 tablets (10 mg total) by mouth once for 1 dose    polyethylene glycol (MIRALAX) 17 g packet; Take 17 g by mouth daily for 17 days    Flatulence  - Increase fluid intake to approximately 80 ounces per day.  - Incorporate non-gassy fiber-rich foods into the diet, aiming for at least 3 to 4 servings of fruits and vegetables daily.  - Limit high FODMAP foods such as apples.  - Add Metamucil if unable to achieve sufficient fiber intake through diet alone.  - Eat slowly to prevent swallowing excess air.  - Increase physical activity, such as walking for an additional half hour daily, to aid in weight loss and alleviate symptoms of gas, bloating, and discomfort.  - Perform endoscopy and colonoscopy on the same day to investigate the cause of increased gas production.  - Take MiraLAX a few days prior to the procedure to aid in preparation.       Gastroesophageal reflux disease without esophagitis  - Continue current regimen of Pepcid daily at 4:30 PM and Tums as needed, especially after consuming acidic foods.           History of Present Illness   Denilson Dial is a 74 y.o. male   History of Present Illness  The patient is a 74-year-old male who presents for evaluation of increased flatulence.    He reports excessive gas production, both belching and flatulence, which he attributes to his esophagus. The increased gas production has been  ongoing for approximately 6 months. He notes that consumption of apples or prunes exacerbates his flatulence. His bowel movements are regular, occurring every other day, and have remained consistent over the past year. Prior to this, he had daily bowel movements. His stools are classified as [place] type 3 and 4. He maintains a good intake of fiber and fluids, consuming at least 32 ounces of water daily. His diet is rich in vegetables.    He experiences heartburn, which is typically relieved by Tums. He also takes Pepcid daily at 4:30 PM, which he finds effective. He does not experience chest pain or shortness of breath. He has noticed that rapid eating leads to a sensation of fullness and hiccups. He engages in regular walking for exercise.    He underwent a colonoscopy in 2021, which demonstrated multiple colon polyps, with a repeat colonoscopy planned in 3 to 5 years. Previous evaluations, including a barium swallow, revealed mild esophageal dysmotility and incidental small bowel malrotation. A CT scan was within normal limits, except for evidence of colonic diverticulosis. His gallbladder is intact.    SOCIAL HISTORY  Exercise: Walks for about an hour, sometimes goes to the park.  Diet: Eats a lot of vegetables and fruits, particularly apples and prunes.  Coffee/Tea/Caffeine-containing Drinks: Drinks coffee and tea.    Review of Systems   Constitutional: Negative.    HENT: Negative.     Eyes: Negative.    Respiratory: Negative.     Cardiovascular: Negative.    Gastrointestinal:  Positive for abdominal distention and constipation. Negative for abdominal pain, anal bleeding, blood in stool, diarrhea, nausea, rectal pain and vomiting.   Endocrine: Negative.    Musculoskeletal: Negative.    Skin: Negative.    Allergic/Immunologic: Negative.    Neurological: Negative.    Hematological: Negative.    Psychiatric/Behavioral: Negative.      A complete review of systems is negative other than that noted above in the  "HPI.         Objective   /78 (BP Location: Right arm, Patient Position: Sitting, Cuff Size: Adult)   Temp (!) 96.9 °F (36.1 °C) (Tympanic)   Ht 5' 9\" (1.753 m)   Wt 80.7 kg (178 lb)   BMI 26.29 kg/m²     Physical Exam  HENT:      Head: Normocephalic and atraumatic.     Cardiovascular:      Rate and Rhythm: Normal rate and regular rhythm.   Pulmonary:      Effort: Pulmonary effort is normal.      Breath sounds: Normal breath sounds.   Abdominal:      General: Bowel sounds are normal. There is no distension.      Palpations: Abdomen is soft.      Tenderness: There is no abdominal tenderness. There is no rebound.     Musculoskeletal:      Cervical back: Normal range of motion.     Skin:     General: Skin is warm.     Neurological:      Mental Status: He is oriented to person, place, and time.        Physical Exam  Heart: Regular rate and rhythm, no murmurs noted.  Lungs: Clear to auscultation bilaterally, no wheezes, rales, or rhonchi.    Results  Labs   - CBC: Within normal limits   - LFTs: Within normal limits    Imaging   - Colonoscopy: 2021, Multiple colon polyps   - Barium swallow: Mild esophageal dysmotility and incidental small bowel malrotation   - CT scan: Within normal limits with evidence of colonic diverticulosis  Lab Results: I personally reviewed relevant lab results.       Results for orders placed during the hospital encounter of 09/01/21    Colonoscopy    Impression  Four polyps measuring from 2 mm up to 5 mm in the cecum and rectosigmoid; removed by cold snare  Mild diverticula in the transverse colon, descending colon and sigmoid colon  Normal colonoscopy otherwise    RECOMMENDATION:  Repeat colonoscopy in 5 years due to a personal history of colon polyps.      Virgilio Rhodes MD        "

## 2025-06-20 NOTE — PATIENT INSTRUCTIONS
Scheduled date of EGD/colonoscopy (as of today):08.05.25  Physician performing EGD/colonoscopy:DR ANTOINE  [facility] of EGD[facility]colonoscopy[facility]  Desired bowel prep reviewed with patient:TEJAL  Instructions reviewed with patient by:MICHAEL  Clearances:  N/A  MANOMETRY scheduled 08/12/25 @[facility]

## 2025-07-03 DIAGNOSIS — R35.1 BPH ASSOCIATED WITH NOCTURIA: ICD-10-CM

## 2025-07-03 DIAGNOSIS — N40.1 BPH ASSOCIATED WITH NOCTURIA: ICD-10-CM

## 2025-07-03 DIAGNOSIS — I10 ESSENTIAL HYPERTENSION: ICD-10-CM

## 2025-07-03 RX ORDER — LOSARTAN POTASSIUM 100 MG/1
100 TABLET ORAL DAILY
Qty: 90 TABLET | Refills: 0 | Status: SHIPPED | OUTPATIENT
Start: 2025-07-03

## 2025-07-03 RX ORDER — OXYBUTYNIN CHLORIDE 10 MG/1
10 TABLET, EXTENDED RELEASE ORAL
Qty: 90 TABLET | Refills: 0 | Status: SHIPPED | OUTPATIENT
Start: 2025-07-03

## 2025-07-08 DIAGNOSIS — E78.5 HYPERLIPIDEMIA, UNSPECIFIED HYPERLIPIDEMIA TYPE: ICD-10-CM

## 2025-07-10 RX ORDER — ATORVASTATIN CALCIUM 40 MG/1
40 TABLET, FILM COATED ORAL DAILY
Qty: 90 TABLET | Refills: 1 | Status: SHIPPED | OUTPATIENT
Start: 2025-07-10

## 2025-07-22 ENCOUNTER — ANESTHESIA EVENT (OUTPATIENT)
Dept: ANESTHESIOLOGY | Facility: HOSPITAL | Age: 75
End: 2025-07-22

## 2025-07-22 ENCOUNTER — ANESTHESIA (OUTPATIENT)
Dept: ANESTHESIOLOGY | Facility: HOSPITAL | Age: 75
End: 2025-07-22

## 2025-07-22 ENCOUNTER — ANESTHESIA EVENT (OUTPATIENT)
Dept: GASTROENTEROLOGY | Facility: MEDICAL CENTER | Age: 75
End: 2025-07-22
Payer: MEDICARE

## 2025-07-25 ENCOUNTER — TELEPHONE (OUTPATIENT)
Dept: GASTROENTEROLOGY | Facility: HOSPITAL | Age: 75
End: 2025-07-25

## 2025-07-30 ENCOUNTER — APPOINTMENT (OUTPATIENT)
Dept: LAB | Facility: CLINIC | Age: 75
End: 2025-07-30
Payer: MEDICARE

## 2025-07-30 DIAGNOSIS — I10 ESSENTIAL HYPERTENSION: ICD-10-CM

## 2025-07-30 DIAGNOSIS — E78.5 HYPERLIPIDEMIA, UNSPECIFIED HYPERLIPIDEMIA TYPE: ICD-10-CM

## 2025-07-30 DIAGNOSIS — R73.01 ELEVATED FASTING GLUCOSE: ICD-10-CM

## 2025-07-30 LAB
ALBUMIN SERPL BCG-MCNC: 4.2 G/DL (ref 3.5–5)
ALP SERPL-CCNC: 85 U/L (ref 34–104)
ALT SERPL W P-5'-P-CCNC: 24 U/L (ref 7–52)
ANION GAP SERPL CALCULATED.3IONS-SCNC: 10 MMOL/L (ref 4–13)
AST SERPL W P-5'-P-CCNC: 23 U/L (ref 13–39)
BILIRUB SERPL-MCNC: 0.78 MG/DL (ref 0.2–1)
BUN SERPL-MCNC: 10 MG/DL (ref 5–25)
CALCIUM SERPL-MCNC: 9.5 MG/DL (ref 8.4–10.2)
CHLORIDE SERPL-SCNC: 101 MMOL/L (ref 96–108)
CHOLEST SERPL-MCNC: 168 MG/DL (ref ?–200)
CO2 SERPL-SCNC: 24 MMOL/L (ref 21–32)
CREAT SERPL-MCNC: 0.68 MG/DL (ref 0.6–1.3)
EST. AVERAGE GLUCOSE BLD GHB EST-MCNC: 120 MG/DL
GFR SERPL CREATININE-BSD FRML MDRD: 94 ML/MIN/1.73SQ M
GLUCOSE P FAST SERPL-MCNC: 91 MG/DL (ref 65–99)
HBA1C MFR BLD: 5.8 %
HDLC SERPL-MCNC: 46 MG/DL
LDLC SERPL CALC-MCNC: 109 MG/DL (ref 0–100)
POTASSIUM SERPL-SCNC: 5 MMOL/L (ref 3.5–5.3)
PROT SERPL-MCNC: 7.2 G/DL (ref 6.4–8.4)
SODIUM SERPL-SCNC: 135 MMOL/L (ref 135–147)
TRIGL SERPL-MCNC: 67 MG/DL (ref ?–150)
TSH SERPL DL<=0.05 MIU/L-ACNC: 2.38 UIU/ML (ref 0.45–4.5)

## 2025-07-30 PROCEDURE — 84443 ASSAY THYROID STIM HORMONE: CPT

## 2025-07-30 PROCEDURE — 80053 COMPREHEN METABOLIC PANEL: CPT

## 2025-07-30 PROCEDURE — 83036 HEMOGLOBIN GLYCOSYLATED A1C: CPT

## 2025-07-30 PROCEDURE — 36415 COLL VENOUS BLD VENIPUNCTURE: CPT

## 2025-07-30 PROCEDURE — 80061 LIPID PANEL: CPT

## 2025-08-05 ENCOUNTER — HOSPITAL ENCOUNTER (OUTPATIENT)
Dept: GASTROENTEROLOGY | Facility: MEDICAL CENTER | Age: 75
Setting detail: OUTPATIENT SURGERY
Discharge: HOME | End: 2025-08-05
Attending: INTERNAL MEDICINE
Payer: MEDICARE

## 2025-08-05 ENCOUNTER — ANESTHESIA (OUTPATIENT)
Dept: GASTROENTEROLOGY | Facility: MEDICAL CENTER | Age: 75
End: 2025-08-05
Payer: MEDICARE

## 2025-08-05 VITALS
BODY MASS INDEX: 24.88 KG/M2 | TEMPERATURE: 97.2 F | HEART RATE: 61 BPM | WEIGHT: 168 LBS | HEIGHT: 69 IN | RESPIRATION RATE: 16 BRPM | OXYGEN SATURATION: 98 % | SYSTOLIC BLOOD PRESSURE: 148 MMHG | DIASTOLIC BLOOD PRESSURE: 72 MMHG

## 2025-08-05 DIAGNOSIS — K22.4 ESOPHAGEAL DYSMOTILITY: ICD-10-CM

## 2025-08-05 DIAGNOSIS — K20.90 ESOPHAGITIS: Primary | ICD-10-CM

## 2025-08-05 DIAGNOSIS — K59.04 CHRONIC IDIOPATHIC CONSTIPATION: ICD-10-CM

## 2025-08-05 PROCEDURE — 88305 TISSUE EXAM BY PATHOLOGIST: CPT | Performed by: PATHOLOGY

## 2025-08-05 RX ORDER — OMEPRAZOLE 40 MG/1
40 CAPSULE, DELAYED RELEASE ORAL DAILY
Qty: 30 CAPSULE | Refills: 6 | Status: SHIPPED | OUTPATIENT
Start: 2025-08-05 | End: 2025-09-04

## 2025-08-05 RX ORDER — PROPOFOL 10 MG/ML
INJECTION, EMULSION INTRAVENOUS AS NEEDED
Status: DISCONTINUED | OUTPATIENT
Start: 2025-08-05 | End: 2025-08-05

## 2025-08-05 RX ORDER — SODIUM CHLORIDE, SODIUM LACTATE, POTASSIUM CHLORIDE, CALCIUM CHLORIDE 600; 310; 30; 20 MG/100ML; MG/100ML; MG/100ML; MG/100ML
INJECTION, SOLUTION INTRAVENOUS CONTINUOUS PRN
Status: DISCONTINUED | OUTPATIENT
Start: 2025-08-05 | End: 2025-08-05

## 2025-08-05 RX ORDER — PROPOFOL 10 MG/ML
INJECTION, EMULSION INTRAVENOUS CONTINUOUS PRN
Status: DISCONTINUED | OUTPATIENT
Start: 2025-08-05 | End: 2025-08-05

## 2025-08-05 RX ORDER — SODIUM CHLORIDE, SODIUM LACTATE, POTASSIUM CHLORIDE, CALCIUM CHLORIDE 600; 310; 30; 20 MG/100ML; MG/100ML; MG/100ML; MG/100ML
125 INJECTION, SOLUTION INTRAVENOUS CONTINUOUS
Status: DISCONTINUED | OUTPATIENT
Start: 2025-08-05 | End: 2025-08-09 | Stop reason: HOSPADM

## 2025-08-05 RX ADMIN — PROPOFOL 40 MG: 10 INJECTION, EMULSION INTRAVENOUS at 07:39

## 2025-08-05 RX ADMIN — PROPOFOL 140 MCG/KG/MIN: 10 INJECTION, EMULSION INTRAVENOUS at 07:45

## 2025-08-05 RX ADMIN — PROPOFOL 40 MG: 10 INJECTION, EMULSION INTRAVENOUS at 07:40

## 2025-08-05 RX ADMIN — PROPOFOL 40 MG: 10 INJECTION, EMULSION INTRAVENOUS at 07:41

## 2025-08-05 RX ADMIN — PROPOFOL 60 MG: 10 INJECTION, EMULSION INTRAVENOUS at 07:34

## 2025-08-05 RX ADMIN — PROPOFOL 40 MG: 10 INJECTION, EMULSION INTRAVENOUS at 07:35

## 2025-08-05 RX ADMIN — PROPOFOL 40 MG: 10 INJECTION, EMULSION INTRAVENOUS at 07:37

## 2025-08-05 RX ADMIN — PROPOFOL 100 MG: 10 INJECTION, EMULSION INTRAVENOUS at 07:33

## 2025-08-05 RX ADMIN — PROPOFOL 40 MG: 10 INJECTION, EMULSION INTRAVENOUS at 07:38

## 2025-08-05 RX ADMIN — SODIUM CHLORIDE, SODIUM LACTATE, POTASSIUM CHLORIDE, AND CALCIUM CHLORIDE: .6; .31; .03; .02 INJECTION, SOLUTION INTRAVENOUS at 07:15

## 2025-08-05 RX ADMIN — PROPOFOL 40 MG: 10 INJECTION, EMULSION INTRAVENOUS at 07:36

## 2025-08-05 RX ADMIN — PROPOFOL 40 MG: 10 INJECTION, EMULSION INTRAVENOUS at 07:42

## 2025-08-07 ENCOUNTER — OFFICE VISIT (OUTPATIENT)
Dept: FAMILY MEDICINE CLINIC | Facility: CLINIC | Age: 75
End: 2025-08-07
Payer: MEDICARE

## 2025-08-07 VITALS
HEIGHT: 69 IN | SYSTOLIC BLOOD PRESSURE: 142 MMHG | TEMPERATURE: 97.5 F | WEIGHT: 172 LBS | HEART RATE: 55 BPM | BODY MASS INDEX: 25.48 KG/M2 | RESPIRATION RATE: 16 BRPM | DIASTOLIC BLOOD PRESSURE: 82 MMHG | OXYGEN SATURATION: 98 %

## 2025-08-07 DIAGNOSIS — R05.1 ACUTE COUGH: ICD-10-CM

## 2025-08-07 DIAGNOSIS — J20.8 VIRAL BRONCHITIS: Primary | ICD-10-CM

## 2025-08-07 LAB
SARS-COV-2 AG UPPER RESP QL IA: NEGATIVE
VALID CONTROL: NORMAL

## 2025-08-07 PROCEDURE — 99213 OFFICE O/P EST LOW 20 MIN: CPT | Performed by: PHYSICIAN ASSISTANT

## 2025-08-07 PROCEDURE — 87811 SARS-COV-2 COVID19 W/OPTIC: CPT | Performed by: PHYSICIAN ASSISTANT

## 2025-08-07 PROCEDURE — G2211 COMPLEX E/M VISIT ADD ON: HCPCS | Performed by: PHYSICIAN ASSISTANT

## 2025-08-07 RX ORDER — GUAIFENESIN 600 MG/1
1200 TABLET, EXTENDED RELEASE ORAL EVERY 12 HOURS SCHEDULED
Qty: 120 TABLET | Refills: 0 | Status: SHIPPED | OUTPATIENT
Start: 2025-08-07

## 2025-08-07 RX ORDER — PREDNISONE 20 MG/1
40 TABLET ORAL DAILY
Qty: 10 TABLET | Refills: 0 | Status: SHIPPED | OUTPATIENT
Start: 2025-08-07 | End: 2025-08-12

## 2025-08-07 RX ORDER — ALBUTEROL SULFATE 90 UG/1
2 INHALANT RESPIRATORY (INHALATION) EVERY 6 HOURS PRN
Qty: 18 G | Refills: 5 | Status: SHIPPED | OUTPATIENT
Start: 2025-08-07

## 2025-08-08 ENCOUNTER — TELEPHONE (OUTPATIENT)
Age: 75
End: 2025-08-08

## 2025-08-11 PROCEDURE — 88305 TISSUE EXAM BY PATHOLOGIST: CPT | Performed by: PATHOLOGY

## 2025-08-18 ENCOUNTER — TELEPHONE (OUTPATIENT)
Dept: GASTROENTEROLOGY | Facility: CLINIC | Age: 75
End: 2025-08-18

## (undated) DEVICE — FORCEP ELECSURG RADIAL JAW4 2.2 X 240CM  HOT BX